# Patient Record
Sex: FEMALE | Race: WHITE | Employment: OTHER | URBAN - METROPOLITAN AREA
[De-identification: names, ages, dates, MRNs, and addresses within clinical notes are randomized per-mention and may not be internally consistent; named-entity substitution may affect disease eponyms.]

---

## 2017-01-26 ENCOUNTER — APPOINTMENT (OUTPATIENT)
Dept: CT IMAGING | Age: 67
End: 2017-01-26
Attending: EMERGENCY MEDICINE
Payer: MEDICARE

## 2017-01-26 ENCOUNTER — HOSPITAL ENCOUNTER (EMERGENCY)
Age: 67
Discharge: HOME | End: 2017-01-26
Attending: EMERGENCY MEDICINE
Payer: MEDICARE

## 2017-01-26 VITALS
WEIGHT: 146.5 LBS | SYSTOLIC BLOOD PRESSURE: 148 MMHG | BODY MASS INDEX: 26.96 KG/M2 | DIASTOLIC BLOOD PRESSURE: 73 MMHG | HEIGHT: 62 IN | HEART RATE: 115 BPM | TEMPERATURE: 98.5 F | RESPIRATION RATE: 16 BRPM | OXYGEN SATURATION: 96 %

## 2017-01-26 DIAGNOSIS — E78.2 HYPERCHOLESTEROLEMIA WITH HYPERTRIGLYCERIDEMIA: ICD-10-CM

## 2017-01-26 DIAGNOSIS — K57.32 DIVERTICULITIS OF LARGE INTESTINE WITHOUT PERFORATION OR ABSCESS WITHOUT BLEEDING: Primary | ICD-10-CM

## 2017-01-26 LAB
ALBUMIN SERPL BCP-MCNC: 3.9 G/DL (ref 3.5–5)
ALBUMIN/GLOB SERPL: 1 {RATIO} (ref 1.1–2.2)
ALP SERPL-CCNC: 90 U/L (ref 45–117)
ALT SERPL-CCNC: 27 U/L (ref 12–78)
ANION GAP BLD CALC-SCNC: 10 MMOL/L (ref 5–15)
APPEARANCE UR: CLEAR
AST SERPL W P-5'-P-CCNC: 16 U/L (ref 15–37)
BASOPHILS # BLD AUTO: 0 K/UL (ref 0–0.1)
BASOPHILS # BLD: 0 % (ref 0–1)
BILIRUB SERPL-MCNC: 0.3 MG/DL (ref 0.2–1)
BILIRUB UR QL: NEGATIVE
BUN SERPL-MCNC: 15 MG/DL (ref 6–20)
BUN/CREAT SERPL: 20 (ref 12–20)
CALCIUM SERPL-MCNC: 9.6 MG/DL (ref 8.5–10.1)
CHLORIDE SERPL-SCNC: 98 MMOL/L (ref 97–108)
CO2 SERPL-SCNC: 25 MMOL/L (ref 21–32)
COLOR UR: NORMAL
CREAT SERPL-MCNC: 0.75 MG/DL (ref 0.55–1.02)
EOSINOPHIL # BLD: 0.1 K/UL (ref 0–0.4)
EOSINOPHIL NFR BLD: 1 % (ref 0–7)
ERYTHROCYTE [DISTWIDTH] IN BLOOD BY AUTOMATED COUNT: 12.2 % (ref 11.5–14.5)
GLOBULIN SER CALC-MCNC: 3.9 G/DL (ref 2–4)
GLUCOSE SERPL-MCNC: 95 MG/DL (ref 65–100)
GLUCOSE UR STRIP.AUTO-MCNC: NEGATIVE MG/DL
HCT VFR BLD AUTO: 36.4 % (ref 35–47)
HGB BLD-MCNC: 12.4 G/DL (ref 11.5–16)
HGB UR QL STRIP: NEGATIVE
KETONES UR QL STRIP.AUTO: NEGATIVE MG/DL
LEUKOCYTE ESTERASE UR QL STRIP.AUTO: NEGATIVE
LYMPHOCYTES # BLD AUTO: 14 % (ref 12–49)
LYMPHOCYTES # BLD: 1.4 K/UL (ref 0.8–3.5)
MCH RBC QN AUTO: 31.2 PG (ref 26–34)
MCHC RBC AUTO-ENTMCNC: 34.1 G/DL (ref 30–36.5)
MCV RBC AUTO: 91.5 FL (ref 80–99)
MONOCYTES # BLD: 1.1 K/UL (ref 0–1)
MONOCYTES NFR BLD AUTO: 12 % (ref 5–13)
NEUTS SEG # BLD: 7 K/UL (ref 1.8–8)
NEUTS SEG NFR BLD AUTO: 73 % (ref 32–75)
NITRITE UR QL STRIP.AUTO: NEGATIVE
PH UR STRIP: 6 [PH] (ref 5–8)
PLATELET # BLD AUTO: 304 K/UL (ref 150–400)
POTASSIUM SERPL-SCNC: 3.6 MMOL/L (ref 3.5–5.1)
PROT SERPL-MCNC: 7.8 G/DL (ref 6.4–8.2)
PROT UR STRIP-MCNC: NEGATIVE MG/DL
RBC # BLD AUTO: 3.98 M/UL (ref 3.8–5.2)
SODIUM SERPL-SCNC: 133 MMOL/L (ref 136–145)
SP GR UR REFRACTOMETRY: 1.01 (ref 1–1.03)
UROBILINOGEN UR QL STRIP.AUTO: 0.2 EU/DL (ref 0.2–1)
WBC # BLD AUTO: 9.6 K/UL (ref 3.6–11)

## 2017-01-26 PROCEDURE — 74011636320 HC RX REV CODE- 636/320: Performed by: EMERGENCY MEDICINE

## 2017-01-26 PROCEDURE — 80053 COMPREHEN METABOLIC PANEL: CPT | Performed by: EMERGENCY MEDICINE

## 2017-01-26 PROCEDURE — 99283 EMERGENCY DEPT VISIT LOW MDM: CPT

## 2017-01-26 PROCEDURE — 36415 COLL VENOUS BLD VENIPUNCTURE: CPT | Performed by: EMERGENCY MEDICINE

## 2017-01-26 PROCEDURE — 74177 CT ABD & PELVIS W/CONTRAST: CPT

## 2017-01-26 PROCEDURE — 81003 URINALYSIS AUTO W/O SCOPE: CPT | Performed by: EMERGENCY MEDICINE

## 2017-01-26 PROCEDURE — 74011000258 HC RX REV CODE- 258: Performed by: EMERGENCY MEDICINE

## 2017-01-26 PROCEDURE — 85025 COMPLETE CBC W/AUTO DIFF WBC: CPT | Performed by: EMERGENCY MEDICINE

## 2017-01-26 RX ORDER — SODIUM CHLORIDE 0.9 % (FLUSH) 0.9 %
10 SYRINGE (ML) INJECTION
Status: COMPLETED | OUTPATIENT
Start: 2017-01-26 | End: 2017-01-26

## 2017-01-26 RX ORDER — CIPROFLOXACIN 500 MG/1
500 TABLET ORAL 2 TIMES DAILY
Qty: 20 TAB | Refills: 0 | Status: SHIPPED | OUTPATIENT
Start: 2017-01-26 | End: 2017-02-05

## 2017-01-26 RX ORDER — METRONIDAZOLE 500 MG/1
500 TABLET ORAL 2 TIMES DAILY
Qty: 20 TAB | Refills: 0 | Status: SHIPPED | OUTPATIENT
Start: 2017-01-26 | End: 2017-02-05

## 2017-01-26 RX ORDER — SIMVASTATIN 20 MG/1
TABLET, FILM COATED ORAL
Qty: 90 TAB | Refills: 0 | Status: SHIPPED | OUTPATIENT
Start: 2017-01-26 | End: 2017-02-19 | Stop reason: SDUPTHER

## 2017-01-26 RX ADMIN — SODIUM CHLORIDE 100 ML: 900 INJECTION, SOLUTION INTRAVENOUS at 11:42

## 2017-01-26 RX ADMIN — Medication 10 ML: at 11:42

## 2017-01-26 RX ADMIN — IOPAMIDOL 100 ML: 755 INJECTION, SOLUTION INTRAVENOUS at 11:42

## 2017-01-26 NOTE — DISCHARGE INSTRUCTIONS
Diverticulitis: Care Instructions  Your Care Instructions    Diverticulitis occurs when pouches form in the wall of the colon and become inflamed or infected. It can be very painful. Doctors aren't sure what causes diverticulitis. There is no proof that foods such as nuts, seeds, or berries cause it or make it worse. A low-fiber diet may cause the colon to work harder to push stool forward. Pouches may form because of this extra work. It may be hard to think about healthy eating while you're in pain. But as you recover, you might think about how you can use healthy eating for overall better health. Healthy eating may help you avoid future attacks. Follow-up care is a key part of your treatment and safety. Be sure to make and go to all appointments, and call your doctor if you are having problems. It's also a good idea to know your test results and keep a list of the medicines you take. How can you care for yourself at home? · Drink plenty of fluids, enough so that your urine is light yellow or clear like water. If you have kidney, heart, or liver disease and have to limit fluids, talk with your doctor before you increase the amount of fluids you drink. · Stick to liquids or a bland diet (plain rice, bananas, dry toast or crackers, applesauce) until you are feeling better. Then you can return to regular foods and gradually increase the amount of fiber in your diet. · Use a heating pad set on low on your belly to relieve mild cramps and pain. · Get extra rest until you are feeling better. · Be safe with medicines. Read and follow all instructions on the label. ¨ If the doctor gave you a prescription medicine for pain, take it as prescribed. ¨ If you are not taking a prescription pain medicine, ask your doctor if you can take an over-the-counter medicine. · If your doctor prescribed antibiotics, take them as directed. Do not stop taking them just because you feel better.  You need to take the full course of antibiotics. To prevent future attacks of diverticulitis  · Avoid constipation:  ¨ Include fruits, vegetables, beans, and whole grains in your diet each day. These foods are high in fiber. ¨ Drink plenty of fluids, enough so that your urine is light yellow or clear like water. If you have kidney, heart, or liver disease and have to limit fluids, talk with your doctor before you increase the amount of fluids you drink. ¨ Get some exercise every day. Build up slowly to 30 to 60 minutes a day on 5 or more days of the week. ¨ Take a fiber supplement, such as Citrucel or Metamucil, every day if needed. Read and follow all instructions on the label. ¨ Schedule time each day for a bowel movement. Having a daily routine may help. Take your time and do not strain when having a bowel movement. When should you call for help? Call 911 anytime you think you may need emergency care. For example, call if:  · You passed out (lost consciousness). · You vomit blood or what looks like coffee grounds. · You pass maroon or very bloody stools. Call your doctor now or seek immediate medical care if:  · You have severe pain or swelling in your belly. · You have a new or higher fever. · You cannot keep down fluids or medicines. · You have new pain that gets worse when you move or cough. Watch closely for changes in your health, and be sure to contact your doctor if:  · The symptoms you had when you first started feeling sick come back. · You do not get better as expected. Where can you learn more? Go to http://douglas-juany.info/. Enter H901 in the search box to learn more about \"Diverticulitis: Care Instructions. \"  Current as of: August 9, 2016  Content Version: 11.1  © 8896-6092 [employer]. Care instructions adapted under license by [employer] (which disclaims liability or warranty for this information).  If you have questions about a medical condition or this instruction, always ask your healthcare professional. [place] [address] any warranty or liability for your use of this information. We hope that we have addressed all of your medical concerns. The examination and treatment you received in the Emergency Department were for an emergent problem and were not intended as complete care. It is important that you follow up with your healthcare provider(s) for ongoing care. If your symptoms worsen or do not improve as expected, and you are unable to reach your usual health care provider(s), you should return to the Emergency Department. Today's healthcare is undergoing tremendous change, and patient satisfaction surveys are one of the many tools to assess the quality of medical care. You may receive a survey from the [employer] regarding your experience in the Emergency Department. I hope that your experience has been completely positive, particularly the medical care that I provided. As such, please participate in the survey; anything less than excellent does not meet my expectations or intentions. [facility] and [address] [place] [address] participate in nationally recognized quality of care measures. If your blood pressure is greater than 120/80, as reported below, we urge that you seek medical care to address the potential of high blood pressure, commonly known as hypertension. Hypertension can be hereditary or can be caused by certain medical conditions, pain, stress, or \"white coat syndrome. \"       Please make an appointment with your health care provider(s) for follow up of your Emergency Department visit. VITALS:   Patient Vitals for the past 8 hrs:   Temp Pulse Resp BP SpO2   01/26/17 0944 98.5 °F (36.9 °C) (!) 115 16 148/73 96 %          Thank you for allowing us to provide you with medical care today. We realize that you have many choices for your emergency care needs.   Please choose us in the future for any continued health care needs. Barbara Bocanegra MD    [facility], [facility].   Office: [phone]            Recent Results (from the past 24 hour(s))   CBC WITH AUTOMATED DIFF    Collection Time: 01/26/17 10:30 AM   Result Value Ref Range    WBC 9.6 3.6 - 11.0 K/uL    RBC 3.98 3.80 - 5.20 M/uL    HGB 12.4 11.5 - 16.0 g/dL    HCT 36.4 35.0 - 47.0 %    MCV 91.5 80.0 - 99.0 FL    MCH 31.2 26.0 - 34.0 PG    MCHC 34.1 30.0 - 36.5 g/dL    RDW 12.2 11.5 - 14.5 %    PLATELET 352 947 - 889 K/uL    NEUTROPHILS 73 32 - 75 %    LYMPHOCYTES 14 12 - 49 %    MONOCYTES 12 5 - 13 %    EOSINOPHILS 1 0 - 7 %    BASOPHILS 0 0 - 1 %    ABS. NEUTROPHILS 7.0 1.8 - 8.0 K/UL    ABS. LYMPHOCYTES 1.4 0.8 - 3.5 K/UL    ABS. MONOCYTES 1.1 (H) 0.0 - 1.0 K/UL    ABS. EOSINOPHILS 0.1 0.0 - 0.4 K/UL    ABS. BASOPHILS 0.0 0.0 - 0.1 K/UL   METABOLIC PANEL, COMPREHENSIVE    Collection Time: 01/26/17 10:30 AM   Result Value Ref Range    Sodium 133 (L) 136 - 145 mmol/L    Potassium 3.6 3.5 - 5.1 mmol/L    Chloride 98 97 - 108 mmol/L    CO2 25 21 - 32 mmol/L    Anion gap 10 5 - 15 mmol/L    Glucose 95 65 - 100 mg/dL    BUN 15 6 - 20 MG/DL    Creatinine 0.75 0.55 - 1.02 MG/DL    BUN/Creatinine ratio 20 12 - 20      GFR est AA >60 >60 ml/min/1.73m2    GFR est non-AA >60 >60 ml/min/1.73m2    Calcium 9.6 8.5 - 10.1 MG/DL    Bilirubin, total 0.3 0.2 - 1.0 MG/DL    ALT 27 12 - 78 U/L    AST 16 15 - 37 U/L    Alk.  phosphatase 90 45 - 117 U/L    Protein, total 7.8 6.4 - 8.2 g/dL    Albumin 3.9 3.5 - 5.0 g/dL    Globulin 3.9 2.0 - 4.0 g/dL    A-G Ratio 1.0 (L) 1.1 - 2.2     URINALYSIS W/ RFLX MICROSCOPIC    Collection Time: 01/26/17 10:30 AM   Result Value Ref Range    Color YELLOW/STRAW      Appearance CLEAR CLEAR      Specific gravity 1.009 1.003 - 1.030      pH (UA) 6.0 5.0 - 8.0      Protein NEGATIVE  NEG mg/dL    Glucose NEGATIVE  NEG mg/dL    Ketone NEGATIVE  NEG mg/dL    Bilirubin NEGATIVE  NEG      Blood NEGATIVE  NEG      Urobilinogen 0.2 0.2 - 1.0 EU/dL    Nitrites NEGATIVE  NEG      Leukocyte Esterase NEGATIVE  NEG         Ct Abd Pelv W Cont    Result Date: 1/26/2017  INDICATION: Abdominal pain. Intermittent abdominal pain and constipation for one and half weeks. Exam: CT of the abdomen and pelvis is performed with 5 mm collimation. The study is performed with 100 mL of nonionic IV Isovue 370. Portal venous phase images were obtained. Noncontrast images were not performed. Coronal and sagittal reformatted images were also performed. CT dose reduction was achieved through use of a standardized protocol tailored for this examination and automatic exposure control for dose modulation. Adaptive statistical iterative reconstruction (ASIR) was utilized. Direct comparison is made to prior CT dated August 2010. FINDINGS: The visualized lung bases are clear. Abdomen: Liver: There is diffuse fatty infiltration of the liver. Spleen: The spleen is normal. Adrenals: The adrenals are normal. Pancreas: The pancreas is normal. Gallbladder: The gallbladder is normal. Kidneys: The kidneys are normal. Bowel: There is inflammatory stranding and trace fluid adjacent to a mid sigmoid diverticulum. Mid sigmoid appears thickened. There is no drainable focal fluid collection to suggest abscess. There is scattered colonic diverticulosis. There is no mesenteric or retroperitoneal lymphadenopathy. Pelvis: Urinary bladder is partially filled and grossly normal. There is no pelvic lymphadenopathy. The uterus is absent. IMPRESSION: Mid sigmoid colonic diverticulitis. No associated drainable focal fluid collection.

## 2017-01-26 NOTE — ED NOTES
Pt changed into gown, bed in lowest and locked position, call light placed within reach, pt declined warm blanket at this time.

## 2017-01-26 NOTE — ED NOTES
Pt ambuatory out of ED with discharge instructions and prescriptions in hand given by Dr. Charity Oneil; pt verbalized understanding of discharge paperwork and time allotted for questions. VSS. Pt alert and oriented x 4. Pt ambulated with steady gait without assistance.

## 2017-01-26 NOTE — ED PROVIDER NOTES
HPI Comments: 78 y.o. female with past medical history significant for diverticulitis, hypercholesterolemia, migraines, MVP, esophageal ulcer, benign essential hypertension, shingles and appendectomy who presents from home with chief complaint of abdominal pain. Patient states that she is the type of person to have a regular BM each day. She arrives today after not having a normal bowel movement in the past 1.5 weeks with intermittent abdominal pain and cramping. She reports taking dulcolax and using an enema which has allowed her to have BMs \"that are mostly liquid, not much solid\", most recently this morning. She describes her abdominal pain as a dull sore sensation as if someone \"punched it\" with occasional sharp cramping pains. She denies any change in her diet. She reports decreased appetite but is \"making\" herself eat. She denies any pain currently. She states she has seen Dr. Hoda Richards in the past for diverticulitis but cannot be seen for 3 weeks. There are no other acute medical concerns at this time. PCP: Harper Álvarez MD    Note written by eligio Angel, as dictated by Pawan Ramirez MD 10:14 AM      The history is provided by the patient.         Past Medical History:   Diagnosis Date    Benign essential hypertension     Blood type O- 10/2014     with anti-M antibodies    Diverticulitis 8/3/2010     Follow up colonoscopy neg    History of esophageal ulcer (age 5)     has not recurred    History of ganglion cyst      left wrist - removed    History of shingles 2016    Hypercholesterolemia with hypertriglyceridemia 2005     controlled on medication    Hypovitaminosis D     Migraines      migraines    MVP (mitral valve prolapse)        Past Surgical History:   Procedure Laterality Date    Pr abdomen surgery proc unlisted  y-20     tubial ligation    Hx appendectomy       Age 8    Hx colonoscopy  2010 2020 for next    Hx tonsillectomy      Hx orthopaedic  1976     L wrist ganglion cyst    Pr laparoscopy w tot hysterectuterus <=250 gram  w tube/ovary N/A 10/9/2014     ROBOTIC ASSISTED TOTAL LAPAROSCOPIC HYSTERECTOMY / BILATERAL SALPINGO OOPHORECTOMY / PARTIAL PARACERVICAL VAGINECTOMY, CYSTOSCOPY performed by Allen Acosta MD at [address] [place] [address] Pr cystourethroscopy  10/9/2014      performed by Allen Acosta MD at [address] [place] [address] Hx gyn  1987     BTL    Hx gyn  10/2014     Lap JUNO/BSO         Family History:   Problem Relation Age of Onset    Alzheimer Mother 79    Arthritis-osteo Mother     Heart Disease Father 46    Diabetes Father     Hypertension Maternal Grandmother     Stroke Maternal Grandmother     Stroke Maternal Grandfather     High Cholesterol Sister     Alzheimer Brother 64     rapid decline       Social History     Social History    Marital status: SINGLE     Spouse name: N/A    Number of children: N/A    Years of education: N/A     Occupational History    Administration specialists [employer]     Social History Main Topics    Smoking status: Current Some Day Smoker     Years: 40.00    Smokeless tobacco: Never Used      Comment: 3 cigarettes/ day current. 1/2 ppd for 40 years or so    Alcohol use 0.5 - 1.0 oz/week     1 - 2 Cans of beer per week      Comment: only weekends    Drug use: No    Sexual activity: No     Other Topics Concern    Weight Concern Yes     she feels better at 125 lbs - weight gain w/ gyn surg    Exercise Yes     3-5 miles 4 days per week and enjoys dancing! Social History Narrative    . No children. Works full time for [employer],  since 2013. She lives independent at a condo in [address]: [place]    Review of Systems   Constitutional: Positive for appetite change. Negative for chills and fever. HENT: Negative for rhinorrhea, sore throat and trouble swallowing. Eyes: Negative for photophobia. Respiratory: Negative for cough and shortness of breath.     Cardiovascular: Negative for chest pain and palpitations. Gastrointestinal: Positive for abdominal pain and constipation. Negative for nausea and vomiting. Genitourinary: Negative for dysuria, frequency and hematuria. Musculoskeletal: Negative for arthralgias. Neurological: Negative for dizziness, syncope and weakness. Psychiatric/Behavioral: Negative for behavioral problems. The patient is not nervous/anxious. All other systems reviewed and are negative. Vitals:    01/26/17 0944   BP: 148/73   Pulse: (!) 115   Resp: 16   Temp: 98.5 °F (36.9 °C)   SpO2: 96%   Weight: 66.5 kg (146 lb 8 oz)   Height: 5' 2\" (1.575 m)            Physical Exam   Constitutional: She appears well-developed and well-nourished. HENT:   Head: Normocephalic and atraumatic. Mouth/Throat: Oropharynx is clear and moist.   Eyes: EOM are normal. Pupils are equal, round, and reactive to light. Neck: Normal range of motion. Neck supple. Cardiovascular: Regular rhythm, normal heart sounds and intact distal pulses. Exam reveals no gallop and no friction rub. No murmur heard. Pulse rate 115   Pulmonary/Chest: Effort normal. No respiratory distress. She has no wheezes. She has no rales. Abdominal: Soft. There is tenderness (Mild bilateral lower quadrant tenderness). There is no rebound. Musculoskeletal: Normal range of motion. She exhibits no tenderness. Neurological: She is alert. No cranial nerve deficit. Motor; symmetric   Skin: No erythema. Psychiatric: She has a normal mood and affect. Her behavior is normal.   Nursing note and vitals reviewed.   Note written by eligio Yeager, as dictated by Mirlande Knox MD 10:15 AM       [facility]  ED Course       Procedures

## 2017-02-13 DIAGNOSIS — E78.2 HYPERCHOLESTEROLEMIA WITH HYPERTRIGLYCERIDEMIA: ICD-10-CM

## 2017-02-13 DIAGNOSIS — I10 BENIGN ESSENTIAL HYPERTENSION: ICD-10-CM

## 2017-02-13 DIAGNOSIS — Z11.59 NEED FOR HEPATITIS C SCREENING TEST: ICD-10-CM

## 2017-02-13 DIAGNOSIS — E55.9 HYPOVITAMINOSIS D: ICD-10-CM

## 2017-02-17 ENCOUNTER — HOSPITAL ENCOUNTER (OUTPATIENT)
Dept: LAB | Age: 67
Discharge: HOME | End: 2017-02-17
Payer: MEDICARE

## 2017-02-17 PROCEDURE — 80061 LIPID PANEL: CPT

## 2017-02-17 PROCEDURE — 86803 HEPATITIS C AB TEST: CPT

## 2017-02-17 PROCEDURE — 85027 COMPLETE CBC AUTOMATED: CPT

## 2017-02-17 PROCEDURE — 80053 COMPREHEN METABOLIC PANEL: CPT

## 2017-02-17 PROCEDURE — 36415 COLL VENOUS BLD VENIPUNCTURE: CPT

## 2017-02-17 PROCEDURE — 82306 VITAMIN D 25 HYDROXY: CPT

## 2017-02-18 LAB
25(OH)D3+25(OH)D2 SERPL-MCNC: 36.2 NG/ML (ref 30–100)
ALBUMIN SERPL-MCNC: 4.5 G/DL (ref 3.6–4.8)
ALBUMIN/GLOB SERPL: 2.1 {RATIO} (ref 1.1–2.5)
ALP SERPL-CCNC: 65 IU/L (ref 39–117)
ALT SERPL-CCNC: 23 IU/L (ref 0–32)
AST SERPL-CCNC: 25 IU/L (ref 0–40)
BILIRUB SERPL-MCNC: 0.4 MG/DL (ref 0–1.2)
BUN SERPL-MCNC: 11 MG/DL (ref 8–27)
BUN/CREAT SERPL: 13 (ref 11–26)
CALCIUM SERPL-MCNC: 9.9 MG/DL (ref 8.7–10.3)
CHLORIDE SERPL-SCNC: 99 MMOL/L (ref 96–106)
CHOLEST SERPL-MCNC: 204 MG/DL (ref 100–199)
CO2 SERPL-SCNC: 27 MMOL/L (ref 18–29)
CREAT SERPL-MCNC: 0.82 MG/DL (ref 0.57–1)
ERYTHROCYTE [DISTWIDTH] IN BLOOD BY AUTOMATED COUNT: 13.6 % (ref 12.3–15.4)
GLOBULIN SER CALC-MCNC: 2.1 G/DL (ref 1.5–4.5)
GLUCOSE SERPL-MCNC: 91 MG/DL (ref 65–99)
HCT VFR BLD AUTO: 38 % (ref 34–46.6)
HCV AB S/CO SERPL IA: <0.1 S/CO RATIO (ref 0–0.9)
HCV AB S/CO SERPL IA: <0.1 S/CO RATIO (ref 0–0.9)
HDLC SERPL-MCNC: 51 MG/DL
HGB BLD-MCNC: 12.4 G/DL (ref 11.1–15.9)
INTERPRETATION, 910389: NORMAL
LDLC SERPL CALC-MCNC: 120 MG/DL (ref 0–99)
MCH RBC QN AUTO: 30 PG (ref 26.6–33)
MCHC RBC AUTO-ENTMCNC: 32.6 G/DL (ref 31.5–35.7)
MCV RBC AUTO: 92 FL (ref 79–97)
PLATELET # BLD AUTO: 257 X10E3/UL (ref 150–379)
POTASSIUM SERPL-SCNC: 4.5 MMOL/L (ref 3.5–5.2)
PROT SERPL-MCNC: 6.6 G/DL (ref 6–8.5)
RBC # BLD AUTO: 4.14 X10E6/UL (ref 3.77–5.28)
SODIUM SERPL-SCNC: 142 MMOL/L (ref 134–144)
TRIGL SERPL-MCNC: 163 MG/DL (ref 0–149)
VLDLC SERPL CALC-MCNC: 33 MG/DL (ref 5–40)
WBC # BLD AUTO: 4.3 X10E3/UL (ref 3.4–10.8)

## 2017-02-19 DIAGNOSIS — E78.2 HYPERCHOLESTEROLEMIA WITH HYPERTRIGLYCERIDEMIA: ICD-10-CM

## 2017-02-19 RX ORDER — SIMVASTATIN 20 MG/1
40 TABLET, FILM COATED ORAL
Qty: 90 TAB | Refills: 0 | COMMUNITY
Start: 2017-02-19 | End: 2017-04-19 | Stop reason: SDUPTHER

## 2017-02-20 NOTE — PROGRESS NOTES
The following blueKiwi message was sent to patient:    Your general lab work looks great - all within normal range. Your fasting cholesterol panel shows a few changes with stopping the Tricor - the triglycerides are just mildly elevated and the LDL type cholesterol has risen as well. I do not think it is necessary to return to the Tricor based on these results, but I do recommend doubling your simvastatin dose to 40 mg daily. You may take two of the 20 mg tablets until you run low, and then please request a new script from me.

## 2017-03-01 ENCOUNTER — OFFICE VISIT (OUTPATIENT)
Dept: INTERNAL MEDICINE CLINIC | Age: 67
End: 2017-03-01

## 2017-03-01 VITALS
HEIGHT: 62 IN | OXYGEN SATURATION: 98 % | BODY MASS INDEX: 26.46 KG/M2 | DIASTOLIC BLOOD PRESSURE: 84 MMHG | TEMPERATURE: 97.6 F | HEART RATE: 84 BPM | RESPIRATION RATE: 16 BRPM | SYSTOLIC BLOOD PRESSURE: 124 MMHG | WEIGHT: 143.8 LBS

## 2017-03-01 DIAGNOSIS — I10 BENIGN ESSENTIAL HYPERTENSION: ICD-10-CM

## 2017-03-01 DIAGNOSIS — E78.2 HYPERCHOLESTEROLEMIA WITH HYPERTRIGLYCERIDEMIA: Primary | ICD-10-CM

## 2017-03-01 PROBLEM — K57.92 ACUTE DIVERTICULITIS OF INTESTINE: Status: ACTIVE | Noted: 2017-03-01

## 2017-03-01 RX ORDER — HYDROCHLOROTHIAZIDE 25 MG/1
TABLET ORAL
Qty: 90 TAB | Refills: 1 | Status: SHIPPED | OUTPATIENT
Start: 2017-03-01 | End: 2017-06-05 | Stop reason: SDUPTHER

## 2017-03-01 NOTE — PROGRESS NOTES
Kwan Rivers is a 78 y.o. female    Chief Complaint   Patient presents with    Abnormal Lab Results     Pt wants to go over her lab results     1. Have you been to the ER, urgent care clinic since your last visit? Hospitalized since your last visit? Yes, Constipation; Abdominal Pain, Deuel's 01/26/17    2. Have you seen or consulted any other health care providers outside of the [address] [place] [address] since your last visit? Include any pap smears or colon screening.  See above

## 2017-03-01 NOTE — MR AVS SNAPSHOT
Visit Information Date & Time Provider Department Dept. Phone Encounter #  
 3/1/2017 10:40 AM [facility] [phone] [id number] Follow-up Instructions Return in about 3 months (around 6/1/2017) for HTN, Hyperlipidemia. Upcoming Health Maintenance Date Due  
 GLAUCOMA SCREENING Q2Y 4/6/2017* MEDICARE YEARLY EXAM 11/12/2017 Pneumococcal 65+ Low/Medium Risk (2 of 2 - PPSV23) 1/1/2018 BREAST CANCER SCRN MAMMOGRAM 11/15/2018 COLONOSCOPY 11/5/2019 DTaP/Tdap/Td series (2 - Td) 7/22/2026 *Topic was postponed. The date shown is not the original due date. Allergies as of 3/1/2017  Review Complete On: 3/1/2017 By: Janelle cMghee NP Severity Noted Reaction Type Reactions Codeine  04/06/2011   Side Effect Nausea Only  
 hives Current Immunizations  Reviewed on 11/11/2016 Name Date Influenza Vaccine 9/5/2016, 8/24/2015, 9/29/2014, 8/15/2013 Pneumococcal Conjugate (PCV-13) 6/12/2015 Pneumococcal Vaccine (Unspecified Type) 1/1/2013 Tdap 7/22/2016 Zoster Vaccine, Live 6/25/2016 Not reviewed this visit You Were Diagnosed With   
  
 Codes Comments Hypercholesterolemia with hypertriglyceridemia    -  Primary ICD-10-CM: D14.8 ICD-9-CM: 272.2 Benign essential hypertension     ICD-10-CM: I10 
ICD-9-CM: 401.1 Vitals BP  
  
  
  
  
  
 124/84 (BP 1 Location: Right arm, BP Patient Position: Sitting) Vitals History BMI and BSA Data Body Mass Index Body Surface Area  
 26.3 kg/m 2 1.69 m 2 Preferred Pharmacy Pharmacy Name Phone [facility] [address] [place] [address], [address] [place] [address] [phone] Your Updated Medication List  
  
   
This list is accurate as of: 3/1/17 11:23 AM.  Always use your most recent med list.  
  
  
  
  
 butalbital-acetaminophen-caffeine -40 mg per tablet Commonly known as:  Olematthiasa Rebekah Take 1 Tab by mouth every six (6) hours as needed for Pain or Headache. Max Daily Amount: 4 Tabs. diazePAM 5 mg tablet Commonly known as:  VALIUM Take 1 tablet daily as needed for migraine  
  
 hydroCHLOROthiazide 25 mg tablet Commonly known as:  HYDRODIURIL  
TAKE 1 TABLET BY MOUTH DAILY  
  
 simvastatin 20 mg tablet Commonly known as:  ZOCOR Take 2 Tabs by mouth nightly. VITAMIN C 500 mg tablet Generic drug:  ascorbic acid (vitamin C) Take  by mouth daily. VITAMIN D3 2,000 unit Tab Generic drug:  cholecalciferol (vitamin D3) Take  by mouth.  
  
 vitamin E 400 unit capsule Commonly known as:  [address] Take  by mouth daily. Prescriptions Sent to Pharmacy Refills  
 hydroCHLOROthiazide (HYDRODIURIL) 25 mg tablet 1 Sig: TAKE 1 TABLET BY MOUTH DAILY Class: Normal  
 Pharmacy: [facility] [address] [place] [address], [address] [place] [address] Ph #: [phone] Follow-up Instructions Return in about 3 months (around 6/1/2017) for HTN, Hyperlipidemia. To-Do List   
 04/05/2017 Lab:  LIPID PANEL Introducing [facility]! Dear Irvin Suh: Thank you for requesting a [employer] account. Our records indicate that you already have an active [employer] account. You can access your account anytime at [email or web address] Did you know that you can access your hospital and ER discharge instructions at any time in [employer]? You can also review all of your test results from your hospital stay or ER visit. Additional Information If you have questions, please visit the Frequently Asked Questions section of the [employer] website at [email or web address] Remember, [employer] is NOT to be used for urgent needs. For medical emergencies, dial 911. Now available from your iPhone and Android! Please provide this summary of care documentation to your next provider. Your primary care clinician is listed as Agnes Gong. If you have any questions after today's visit, please call [phone].

## 2017-03-01 NOTE — PROGRESS NOTES
77 yo female here to discuss/review lab results, and for 6 mo f/u. She reports her Tricor was stopped in Nov, and recent TRG level was 163. After this she was advised to double her Simvastatin dose which she has done about a week ago. She was in the ED in Jan for acute Diverticulitis. She was tx w/ 2 antibiotics. Bowel fx is now back to normal.  She will be seeing Dr. Jonah Aldana next week (previously saw Dr. Abdi Estrada, but couldn't get into their office). She has stopped eating beef, and is eating more veggies. No chest pain or dyspnea. She continues to use tobacco, and is smoking 10 cigs / day; she believes she will be able to cut down on this when she goes to her next job. She is walking 3 miles most days. She will see her Optometrist for Glaucoma testing on April 6. PE: WNWD WF   BP - 124/84   Weight down 3 lbs (voluntary)   Heart - RRR   Lungs - clear   Abd - no M, T,O    Imp: HTN   HLD/Hypertriglyceridemia      Plan: Refill HCTZ   Order for Lipid panel in 5 weeks since Simvastatin was increased to 40 mg   She will see Dr. Bravo Kamara for continued care in 3 mos  _____________________________________  Expected course of current diagnosed problem(s) as well as expected progression and possible complications, and desired follow up with provider are discussed with patient. Patient is encouraged to be back in touch with any questions or concerns. Patient expresses understanding of plan of care. Patient is given AVS which includes diagnoses, current medications, vitals.

## 2017-03-10 ENCOUNTER — HOSPITAL ENCOUNTER (OUTPATIENT)
Age: 67
Setting detail: OUTPATIENT SURGERY
Discharge: HOME | End: 2017-03-10
Attending: SPECIALIST | Admitting: SPECIALIST
Payer: MEDICARE

## 2017-03-10 ENCOUNTER — ANESTHESIA (OUTPATIENT)
Dept: ENDOSCOPY | Age: 67
End: 2017-03-10
Payer: MEDICARE

## 2017-03-10 ENCOUNTER — ANESTHESIA EVENT (OUTPATIENT)
Dept: ENDOSCOPY | Age: 67
End: 2017-03-10
Payer: MEDICARE

## 2017-03-10 VITALS
OXYGEN SATURATION: 99 % | SYSTOLIC BLOOD PRESSURE: 138 MMHG | HEART RATE: 72 BPM | TEMPERATURE: 97.6 F | DIASTOLIC BLOOD PRESSURE: 75 MMHG | RESPIRATION RATE: 17 BRPM

## 2017-03-10 PROCEDURE — 88305 TISSUE EXAM BY PATHOLOGIST: CPT | Performed by: SPECIALIST

## 2017-03-10 PROCEDURE — 76040000019: Performed by: SPECIALIST

## 2017-03-10 PROCEDURE — 74011000250 HC RX REV CODE- 250

## 2017-03-10 PROCEDURE — 76060000031 HC ANESTHESIA FIRST 0.5 HR: Performed by: SPECIALIST

## 2017-03-10 PROCEDURE — 77030013992 HC SNR POLYP ENDOSC BSC -B: Performed by: SPECIALIST

## 2017-03-10 PROCEDURE — 74011250636 HC RX REV CODE- 250/636: Performed by: SPECIALIST

## 2017-03-10 PROCEDURE — 74011250636 HC RX REV CODE- 250/636

## 2017-03-10 PROCEDURE — 77030027957 HC TBNG IRR ENDOGTR BUSS -B: Performed by: SPECIALIST

## 2017-03-10 RX ORDER — ATROPINE SULFATE 0.1 MG/ML
0.5 INJECTION INTRAVENOUS
Status: DISCONTINUED | OUTPATIENT
Start: 2017-03-10 | End: 2017-03-10 | Stop reason: HOSPADM

## 2017-03-10 RX ORDER — PROPOFOL 10 MG/ML
INJECTION, EMULSION INTRAVENOUS AS NEEDED
Status: DISCONTINUED | OUTPATIENT
Start: 2017-03-10 | End: 2017-03-10 | Stop reason: HOSPADM

## 2017-03-10 RX ORDER — FENTANYL CITRATE 50 UG/ML
200 INJECTION, SOLUTION INTRAMUSCULAR; INTRAVENOUS
Status: DISCONTINUED | OUTPATIENT
Start: 2017-03-10 | End: 2017-03-10 | Stop reason: HOSPADM

## 2017-03-10 RX ORDER — MIDAZOLAM HYDROCHLORIDE 1 MG/ML
.25-1 INJECTION, SOLUTION INTRAMUSCULAR; INTRAVENOUS
Status: DISCONTINUED | OUTPATIENT
Start: 2017-03-10 | End: 2017-03-10 | Stop reason: HOSPADM

## 2017-03-10 RX ORDER — LIDOCAINE HYDROCHLORIDE 20 MG/ML
INJECTION, SOLUTION EPIDURAL; INFILTRATION; INTRACAUDAL; PERINEURAL AS NEEDED
Status: DISCONTINUED | OUTPATIENT
Start: 2017-03-10 | End: 2017-03-10 | Stop reason: HOSPADM

## 2017-03-10 RX ORDER — EPINEPHRINE 0.1 MG/ML
1 INJECTION INTRACARDIAC; INTRAVENOUS
Status: DISCONTINUED | OUTPATIENT
Start: 2017-03-10 | End: 2017-03-10 | Stop reason: HOSPADM

## 2017-03-10 RX ORDER — LORAZEPAM 2 MG/ML
2 INJECTION INTRAMUSCULAR AS NEEDED
Status: DISCONTINUED | OUTPATIENT
Start: 2017-03-10 | End: 2017-03-10 | Stop reason: HOSPADM

## 2017-03-10 RX ORDER — SODIUM CHLORIDE 9 MG/ML
50 INJECTION, SOLUTION INTRAVENOUS CONTINUOUS
Status: DISCONTINUED | OUTPATIENT
Start: 2017-03-10 | End: 2017-03-10 | Stop reason: HOSPADM

## 2017-03-10 RX ORDER — NALOXONE HYDROCHLORIDE 0.4 MG/ML
0.4 INJECTION, SOLUTION INTRAMUSCULAR; INTRAVENOUS; SUBCUTANEOUS
Status: DISCONTINUED | OUTPATIENT
Start: 2017-03-10 | End: 2017-03-10 | Stop reason: HOSPADM

## 2017-03-10 RX ORDER — SODIUM CHLORIDE 0.9 % (FLUSH) 0.9 %
5-10 SYRINGE (ML) INJECTION AS NEEDED
Status: DISCONTINUED | OUTPATIENT
Start: 2017-03-10 | End: 2017-03-10 | Stop reason: HOSPADM

## 2017-03-10 RX ORDER — FLUMAZENIL 0.1 MG/ML
0.2 INJECTION INTRAVENOUS
Status: DISCONTINUED | OUTPATIENT
Start: 2017-03-10 | End: 2017-03-10 | Stop reason: HOSPADM

## 2017-03-10 RX ORDER — DEXTROMETHORPHAN/PSEUDOEPHED 2.5-7.5/.8
1.2 DROPS ORAL
Status: DISCONTINUED | OUTPATIENT
Start: 2017-03-10 | End: 2017-03-10 | Stop reason: HOSPADM

## 2017-03-10 RX ORDER — SODIUM CHLORIDE 0.9 % (FLUSH) 0.9 %
5-10 SYRINGE (ML) INJECTION EVERY 8 HOURS
Status: DISCONTINUED | OUTPATIENT
Start: 2017-03-10 | End: 2017-03-10 | Stop reason: HOSPADM

## 2017-03-10 RX ADMIN — SODIUM CHLORIDE: 900 INJECTION, SOLUTION INTRAVENOUS at 12:37

## 2017-03-10 RX ADMIN — PROPOFOL 20 MG: 10 INJECTION, EMULSION INTRAVENOUS at 13:05

## 2017-03-10 RX ADMIN — PROPOFOL 20 MG: 10 INJECTION, EMULSION INTRAVENOUS at 13:10

## 2017-03-10 RX ADMIN — PROPOFOL 20 MG: 10 INJECTION, EMULSION INTRAVENOUS at 12:59

## 2017-03-10 RX ADMIN — PROPOFOL 60 MG: 10 INJECTION, EMULSION INTRAVENOUS at 12:52

## 2017-03-10 RX ADMIN — PROPOFOL 30 MG: 10 INJECTION, EMULSION INTRAVENOUS at 12:53

## 2017-03-10 RX ADMIN — PROPOFOL 20 MG: 10 INJECTION, EMULSION INTRAVENOUS at 12:57

## 2017-03-10 RX ADMIN — PROPOFOL 20 MG: 10 INJECTION, EMULSION INTRAVENOUS at 13:07

## 2017-03-10 RX ADMIN — PROPOFOL 20 MG: 10 INJECTION, EMULSION INTRAVENOUS at 13:03

## 2017-03-10 RX ADMIN — PROPOFOL 30 MG: 10 INJECTION, EMULSION INTRAVENOUS at 12:55

## 2017-03-10 RX ADMIN — LIDOCAINE HYDROCHLORIDE 20 MG: 20 INJECTION, SOLUTION EPIDURAL; INFILTRATION; INTRACAUDAL; PERINEURAL at 12:52

## 2017-03-10 RX ADMIN — PROPOFOL 20 MG: 10 INJECTION, EMULSION INTRAVENOUS at 13:01

## 2017-03-10 NOTE — ANESTHESIA PREPROCEDURE EVALUATION
Anesthetic History   No history of anesthetic complications            Review of Systems / Medical History  Patient summary reviewed, nursing notes reviewed and pertinent labs reviewed    Pulmonary          Smoker         Neuro/Psych         Headaches     Cardiovascular    Hypertension          Hyperlipidemia    Exercise tolerance: >4 METS     GI/Hepatic/Renal               Comments: Diverticulitis (K57.92)  History of esophageal ulcer (Z87.19) Endo/Other  Within defined limits           Other Findings              Physical Exam    Airway  Mallampati: II  TM Distance: 4 - 6 cm  Neck ROM: normal range of motion   Mouth opening: Normal     Cardiovascular  Regular rate and rhythm,  S1 and S2 normal,  no murmur, click, rub, or gallop  Rhythm: regular  Rate: normal         Dental    Dentition: Caps/crowns     Pulmonary  Breath sounds clear to auscultation               Abdominal  GI exam deferred       Other Findings            Anesthetic Plan    ASA: 2  Anesthesia type: MAC          Induction: Intravenous  Anesthetic plan and risks discussed with: Patient

## 2017-03-10 NOTE — H&P
Pre-endoscopy H and P for Colonoscopy    The patient was seen and examined. Date of last colonoscopy: 2010, Polyps  No      The airway was assessed and documented. The problem list, past medical history, and medications were reviewed. Patient Active Problem List   Diagnosis Code    Hypovitaminosis D E55.9    Papanicolaou smear of cervix with atypical squamous cells of undetermined significance (ASC-US) R87.610    Ovarian mass N83.9    Abnormal antibody titer R76.8    Vaginal dysplasia N89.3    Migraine headache G43.909    Benign essential hypertension I10    Hypercholesterolemia with hypertriglyceridemia E78.2    History of colonoscopy Z98.890    Acute diverticulitis of intestine K57.92     Social History     Social History    Marital status: SINGLE     Spouse name: N/A    Number of children: N/A    Years of education: N/A     Occupational History    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 1/2 ppd for 40 years or so    Alcohol use 0.5 - 1.0 oz/week     1 - 2 Cans of beer per week      Comment: only weekends/social    Drug use: No    Sexual activity: No     Other Topics Concern    Weight Concern Yes     she feels better at 125 lbs - weight gain w/ gyn surg    Exercise Yes     3-5 miles 4 days per week and enjoys dancing! Social History Narrative    . No children. Works full time for [employer],  since 2013.   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    Benign essential hypertension     Blood type O- 10/2014    with anti-M antibodies    Diverticulitis 8/3/2010    Follow up colonoscopy neg    History of esophageal ulcer (age 5)    has not recurred    History of ganglion cyst     left wrist - removed    History of shingles 2016    Hypercholesterolemia with hypertriglyceridemia 2005    controlled on medication    Hypovitaminosis D     Ill-defined condition     ESOPHAGEAL ULCER    Migraines     migraines    MVP (mitral valve prolapse)      The patient has a family history of colon polyps in brother    Prior to Admission Medications   Prescriptions Last Dose Informant Patient Reported? Taking? ASPIRIN PO 3/6/2017 at Unknown time  Yes No   Sig: Take 81 mg by mouth daily. ascorbic acid, vitamin C, (VITAMIN C) 500 mg tablet 3/9/2017 at Unknown time  Yes Yes   Sig: Take 500 mg by mouth daily. butalbital-acetaminophen-caffeine (FIORICET, ESGIC) -40 mg per tablet Unknown at Unknown time  No No   Sig: Take 1 Tab by mouth every six (6) hours as needed for Pain or Headache. Max Daily Amount: 4 Tabs. cholecalciferol, vitamin D3, (VITAMIN D3) 2,000 unit tab 3/9/2017 at Unknown time  Yes Yes   Sig: Take 2,000 Units by mouth daily. diazepam (VALIUM) 5 mg tablet Unknown at Unknown time  No No   Sig: Take 1 tablet daily as needed for migraine   hydroCHLOROthiazide (HYDRODIURIL) 25 mg tablet 3/10/2017 at Unknown time  No Yes   Sig: TAKE 1 TABLET BY MOUTH DAILY   simvastatin (ZOCOR) 20 mg tablet 3/9/2017 at Unknown time  Yes Yes   Sig: Take 2 Tabs by mouth nightly. vitamin E (AQUA GEMS) 400 unit capsule 3/9/2017 at Unknown time  Yes Yes   Sig: Take  by mouth daily. Facility-Administered Medications: None         The review of systems is:  negative for shortness of breath or chest pain      The heart, lungs and mental status were satisfactory for the administration of MAC sedation and for the procedure. Mallampati score: See Anesthesia. I discussed with the patient the objectives, risks, consequences and alternatives to the procedure. Plan: Endoscopic procedure with MAC sedation.     Saad Velazquez MD  3/10/2017  12:27 PM    Pre-endoscopy H and P for Colonoscopy    The patient was seen and examined. Date of last colonoscopy: 2010, Polyps  No      The airway was assessed and documented. The problem list, past medical history, and medications were reviewed. Patient Active Problem List   Diagnosis Code    Hypovitaminosis D E55.9    Papanicolaou smear of cervix with atypical squamous cells of undetermined significance (ASC-US) R87.610    Ovarian mass N83.9    Abnormal antibody titer R76.8    Vaginal dysplasia N89.3    Migraine headache G43.909    Benign essential hypertension I10    Hypercholesterolemia with hypertriglyceridemia E78.2    History of colonoscopy Z98.890    Acute diverticulitis of intestine K57.92     Social History     Social History    Marital status: SINGLE     Spouse name: N/A    Number of children: N/A    Years of education: N/A     Occupational History    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 1/2 ppd for 40 years or so    Alcohol use 0.5 - 1.0 oz/week     1 - 2 Cans of beer per week      Comment: only weekends/social    Drug use: No    Sexual activity: No     Other Topics Concern    Weight Concern Yes     she feels better at 125 lbs - weight gain w/ gyn surg    Exercise Yes     3-5 miles 4 days per week and enjoys dancing! Social History Narrative    . No children. Works full time for [employer],  since 2013.   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    Benign essential hypertension     Blood type O- 10/2014    with anti-M antibodies    Diverticulitis 8/3/2010    Follow up colonoscopy neg    History of esophageal ulcer (age 5)    has not recurred    History of ganglion cyst left wrist - removed    History of shingles 2016    Hypercholesterolemia with hypertriglyceridemia 2005    controlled on medication    Hypovitaminosis D     Ill-defined condition     ESOPHAGEAL ULCER    Migraines     migraines    MVP (mitral valve prolapse)      The patient has a family history of na    Prior to Admission Medications   Prescriptions Last Dose Informant Patient Reported? Taking? ASPIRIN PO 3/6/2017 at Unknown time  Yes No   Sig: Take 81 mg by mouth daily. ascorbic acid, vitamin C, (VITAMIN C) 500 mg tablet 3/9/2017 at Unknown time  Yes Yes   Sig: Take 500 mg by mouth daily. butalbital-acetaminophen-caffeine (FIORICET, ESGIC) -40 mg per tablet Unknown at Unknown time  No No   Sig: Take 1 Tab by mouth every six (6) hours as needed for Pain or Headache. Max Daily Amount: 4 Tabs. cholecalciferol, vitamin D3, (VITAMIN D3) 2,000 unit tab 3/9/2017 at Unknown time  Yes Yes   Sig: Take 2,000 Units by mouth daily. diazepam (VALIUM) 5 mg tablet Unknown at Unknown time  No No   Sig: Take 1 tablet daily as needed for migraine   hydroCHLOROthiazide (HYDRODIURIL) 25 mg tablet 3/10/2017 at Unknown time  No Yes   Sig: TAKE 1 TABLET BY MOUTH DAILY   simvastatin (ZOCOR) 20 mg tablet 3/9/2017 at Unknown time  Yes Yes   Sig: Take 2 Tabs by mouth nightly. vitamin E (AQUA GEMS) 400 unit capsule 3/9/2017 at Unknown time  Yes Yes   Sig: Take  by mouth daily. Facility-Administered Medications: None         The review of systems is:  negative for shortness of breath or chest pain      The heart, lungs and mental status were satisfactory for the administration of MAC sedation and for the procedure. Mallampati score: See Anesthesia. I discussed with the patient the objectives, risks, consequences and alternatives to the procedure. Plan: Endoscopic procedure with MAC sedation.     Joanne Walsh MD  3/10/2017  12:27 PM

## 2017-03-10 NOTE — PROCEDURES
Colonoscopy Procedure Note    Indications:   Family history of coloretal adenoma  (screening only), recent bout of LLQ pain and diverticulitis  Referring Physician: Gege Garcia MD   Anesthesia/Sedation:MAC  Endoscopist:  Dr. Perico Goldstein  Assistant:  Endoscopy RN-1: David Wells RN; Fern Barton RN    Preoperative diagnosis: DIVERTICULOSIS    Postoperative diagnosis: 1-Diverticulosis  2-Colon polyps      Procedure in Detail:  Informed consent was obtained for the procedure, including sedation. Risks of perforation, hemorrhage, adverse drug reaction, and aspiration were discussed. The patient was placed in the left lateral decubitus position. Based on the pre-procedure assessment, including review of the patient's medical history, medications, allergies, and review of systems, she had been deemed to be an appropriate candidate for moderate sedation; she was therefore sedated with the medications listed above. The patient was monitored continuously with ECG tracing, pulse oximetry, blood pressure monitoring, and direct observations. A rectal examination was performed. The [id number] was inserted into the rectum and advanced under direct vision to the cecum, which was identified by the ileocecal valve and appendiceal orifice. The quality of the colonic preparation was excellent. A careful inspection was made as the colonoscope was withdrawn, including a retroflexed view of the rectum; findings and interventions are described below. Findings:   Rectum: 3 mm polyp removed with cold snare  Sigmoid: moderate diverticulosis; Descending Colon: moderate diverticulosis;  Transverse Colon: mild diverticulosis; Ascending Colon: mild diverticulosis; 4 mm polyp removed with cold snare  Cecum: normal  Terminal Ileum: not intubated    Specimens:     see above    EBL: None    Complications: None; patient tolerated the procedure well.       Recommendations:     - Repeat colonoscopy in 5 years.    Signed By: Ying Sim MD                        March 10, 2017

## 2017-03-10 NOTE — IP AVS SNAPSHOT
[address] 
[phone] Patient: Kwan Rivers MRN: [id number] OIA:7/42/[birth year]35 You are allergic to the following Allergen Reactions Other Medication Other (comments) PT HAS A RARE BLOOD TYPE - O NEG WITH ANTI M- ANTIBODIES. Codeine Nausea Only  
 hives Recent Documentation Breastfeeding? OB Status Smoking Status No Postmenopausal Current Every Day Smoker Emergency Contacts Name Discharge Info Relation Home Work Mobile Cindy Belle CAREGIVER [3] Friend [5] [phone] About your hospitalization You were admitted on:  March 10, 2017 You last received care in the:  [facility] ENDOSCOPY You were discharged on:  March 10, 2017 Unit phone number:  [phone] Why you were hospitalized Your primary diagnosis was:  Not on File Providers Seen During Your Hospitalizations Provider Role Specialty Primary office phone Aspen Gutierrez MD Attending Provider Gastroenterology [phone] Your Primary Care Physician (PCP) Primary Care Physician Office Phone Office Fax Deann Cornelius [phone] Follow-up Information None Current Discharge Medication List  
  
CONTINUE these medications which have NOT CHANGED Dose & Instructions Dispensing Information Comments Morning Noon Evening Bedtime  
 butalbital-acetaminophen-caffeine -40 mg per tablet Commonly known as:  Ameyaruba Sears Your next dose is: Today, Tomorrow Other:  _________ Dose:  1 Tab Take 1 Tab by mouth every six (6) hours as needed for Pain or Headache. Max Daily Amount: 4 Tabs. Quantity:  90 Tab Refills:  0  
     
   
   
   
  
 diazePAM 5 mg tablet Commonly known as:  VALIUM Your next dose is: Today, Tomorrow Other:  _________ Take 1 tablet daily as needed for migraine Quantity:  90 Tab Refills:  0 This is a 90 day supply  
    
   
   
   
  
 hydroCHLOROthiazide 25 mg tablet Commonly known as:  HYDRODIURIL Your next dose is: Today, Tomorrow Other:  _________ TAKE 1 TABLET BY MOUTH DAILY Quantity:  90 Tab Refills:  1  
     
   
   
   
  
 simvastatin 20 mg tablet Commonly known as:  ZOCOR Your next dose is: Today, Tomorrow Other:  _________ Dose:  40 mg Take 2 Tabs by mouth nightly. Quantity:  90 Tab Refills:  0  
     
   
   
   
  
 VITAMIN C 500 mg tablet Generic drug:  ascorbic acid (vitamin C) Your next dose is: Today, Tomorrow Other:  _________ Dose:  500 mg Take 500 mg by mouth daily. Refills:  0  
     
   
   
   
  
 VITAMIN D3 2,000 unit Tab Generic drug:  cholecalciferol (vitamin D3) Your next dose is: Today, Tomorrow Other:  _________ Dose:  2000 Units Take 2,000 Units by mouth daily. Refills:  0  
     
   
   
   
  
 vitamin E 400 unit capsule Commonly known as:  [address] Your next dose is: Today, Tomorrow Other:  _________ Take  by mouth daily. Refills:  0 STOP taking these medications ASPIRIN PO Discharge Instructions Cindy Henderson [id number] 
1950 COLON DISCHARGE INSTRUCTIONS Discomfort: 
Redness at IV site- apply warm compress to area; if redness or soreness persist- contact your physician There may be a slight amount of blood passed from the rectum Gaseous discomfort- walking, belching will help relieve any discomfort You may not operate a vehicle for 12 hours You may not engage in an occupation involving machinery or appliances for rest of today You may not drink alcoholic beverages for at least 12 hours Avoid making any critical decisions for at least 24 hour DIET: 
 Regular diet.  however -  remember your colon is empty and a heavy meal will produce gas. Avoid these foods:  vegetables, fried / greasy foods, carbonated drinks for today. ACTIVITY: 
You may resume your normal daily activities it is recommended that you spend the remainder of the day resting -  avoid any strenuous activity. CALL M.D. ANY SIGN OF: Increasing pain, nausea, vomiting Abdominal distension (swelling) New increased bleeding (oral or rectal) Fever (chills) Pain in chest area Bloody discharge from nose or mouth Shortness of breath You may not  take any Advil, Aspirin, Ibuprofen, Motrin, Aleve, Goodys, or any similar pain or arthritis products for 3 days, ONLY  Tylenol as needed for pain. Follow-up Instructions: 
 Call Dr. Samantha Cuellar Results of procedure / biopsy in 10-14days Office telephone for problems or questions [phone] Two very small polyps removed - no cancer Recommendation for next colonscopy in 5 years If < 10 years, reason:  above average risk patient Colon Polyps: Care Instructions Your Care Instructions Colon polyps are growths in the colon or the rectum. The cause of most colon polyps is not known, and most people who get them do not have any problems. But a certain kind can turn into cancer. For this reason, regular testing for colon polyps is important for people age 48 and older and anyone who has an increased risk for colon cancer. Polyps are usually found through routine colon cancer screening tests. Although most colon polyps are not cancerous, they are usually removed and then tested for cancer. Screening for colon cancer saves lives because the cancer can usually be cured if it is caught early. If you have a polyp that is the type that can turn into cancer, you may need more tests to examine your entire colon. The doctor will remove any other polyps that he or she finds, and you will be tested more often. Follow-up care is a key part of your treatment and safety. Be sure to make and go to all appointments, and call your doctor if you are having problems. It's also a good idea to know your test results and keep a list of the medicines you take. How can you care for yourself at home? Regular exams to look for colon polyps are the best way to prevent polyps from turning into colon cancer. These can include stool tests, sigmoidoscopy, colonoscopy, and CT colonography. Talk with your doctor about a testing schedule that is right for you. To prevent polyps There is no home treatment that can prevent colon polyps. But these steps may help lower your risk for cancer. · Stay active. Being active can help you get to and stay at a healthy weight. Try to exercise on most days of the week. Walking is a good choice. · Eat well. Choose a variety of vegetables, fruits, legumes (such as peas and beans), fish, poultry, and whole grains. · Do not smoke. If you need help quitting, talk to your doctor about stop-smoking programs and medicines. These can increase your chances of quitting for good. · If you drink alcohol, limit how much you drink. Limit alcohol to 2 drinks a day for men and 1 drink a day for women. When should you call for help? Call your doctor now or seek immediate medical care if: 
· You have severe belly pain. · Your stools are maroon or very bloody. Watch closely for changes in your health, and be sure to contact your doctor if: 
· You have a fever. · You have nausea or vomiting. · You have a change in bowel habits (new constipation or diarrhea). · Your symptoms get worse or are not improving as expected. Where can you learn more? Go to http://douglas-juany.info/. Enter 95 [id number] in the search box to learn more about \"Colon Polyps: Care Instructions. \" Current as of: August 24, 2016 Content Version: 11.1 © 5975-6866 [employer].  Care instructions adapted under license by [address] (which disclaims liability or warranty for this information). If you have questions about a medical condition or this instruction, always ask your healthcare professional. [address] any warranty or liability for your use of this information. Diverticulosis: Care Instructions Your Care Instructions In diverticulosis, pouches called diverticula form in the wall of the large intestine (colon). The pouches do not cause any pain or other symptoms. Most people who have diverticulosis do not know they have it. But the pouches sometimes bleed, and if they become infected, they can cause pain and other symptoms. When this happens, it is called diverticulitis. Diverticula form when pressure pushes the wall of the colon outward at certain weak points. A diet that is too low in fiber can cause diverticula. Follow-up care is a key part of your treatment and safety. Be sure to make and go to all appointments, and call your doctor if you are having problems. It's also a good idea to know your test results and keep a list of the medicines you take. How can you care for yourself at home? · Include fruits, leafy green vegetables, beans, and whole grains in your diet each day. These foods are high in fiber. · Take a fiber supplement, such as Citrucel or Metamucil, every day if needed. Read and follow all instructions on the label. · Drink plenty of fluids, enough so that your urine is light yellow or clear like water. If you have kidney, heart, or liver disease and have to limit fluids, talk with your doctor before you increase the amount of fluids you drink. · Get at least 30 minutes of exercise on most days of the week. Walking is a good choice. You also may want to do other activities, such as running, swimming, cycling, or playing tennis or team sports. · Cut out foods that cause gas, pain, or other symptoms. When should you call for help? Call your doctor now or seek immediate medical care if: 
· You have belly pain. · You pass maroon or very bloody stools. · You have a fever. · You have nausea and vomiting. · You have unusual changes in your bowel movements or abdominal swelling. · You have burning pain when you urinate. · You have abnormal vaginal discharge. · You have shoulder pain. · You have cramping pain that does not get better when you have a bowel movement or pass gas. · You pass gas or stool from your urethra while urinating. Watch closely for changes in your health, and be sure to contact your doctor if you have any problems. Where can you learn more? Go to http://douglas-juany.info/. Enter L540 in the search box to learn more about \"Diverticulosis: Care Instructions. \" Current as of: August 9, 2016 Content Version: 11.1 © 6840-6057 [employer]. Care instructions adapted under license by [employer] (which disclaims liability or warranty for this information). If you have questions about a medical condition or this instruction, always ask your healthcare professional. [place] [address] any warranty or liability for your use of this information. Discharge Orders None Introducing [facility]! Dear Chayo Gutierrez: Thank you for requesting a [email or web address] account. Our records indicate that you already have an active [email or web address] account. You can access your account anytime at [email or web address] Did you know that you can access your hospital and ER discharge instructions at any time in [email or web address]? You can also review all of your test results from your hospital stay or ER visit. Additional Information If you have questions, please visit the Frequently Asked Questions section of the [email or web address] website at [email or web address] Remember, [email or web address] is NOT to be used for urgent needs. For medical emergencies, dial 911. Now available from your iPhone and Android! General Information Please provide this summary of care documentation to your next provider. Patient Signature:  ____________________________________________________________ Date:  ____________________________________________________________  
  
Burlene Dallin Provider Signature:  ____________________________________________________________ Date:  ____________________________________________________________

## 2017-03-10 NOTE — PERIOP NOTES
Initial RN admission and assessment performed and documented in Endoscopy navigator. Patient evaluated by anesthesia in pre-procedure holding. All procedural vital signs, airway assessment, and level of consciousness information monitored and recorded by anesthesia staff on the anesthesia record. Anesthesia reports meds given during procedure, see MAR. Received report from anesthesia staff on vital signs and status of patient.

## 2017-03-10 NOTE — ANESTHESIA POSTPROCEDURE EVALUATION
Post-Anesthesia Evaluation and Assessment    Patient: Lynette Abbasi MRN: [id number]  SSN: [id number]    YOB: 1950  Age: 78 y.o. Sex: female       Cardiovascular Function/Vital Signs  Visit Vitals    /75    Pulse 72    Temp 36.4 °C (97.6 °F)    Resp 17    SpO2 99%    Breastfeeding No       Patient is status post MAC anesthesia for Procedure(s):  COLONOSCOPY  ENDOSCOPIC POLYPECTOMY. Nausea/Vomiting: None    Postoperative hydration reviewed and adequate. Pain:  Pain Scale 1: Numeric (0 - 10) (03/10/17 1337)  Pain Intensity 1: 0 (03/10/17 1337)   Managed    Neurological Status: At baseline    Mental Status and Level of Consciousness: Alert and oriented     Pulmonary Status:   O2 Device: Room air (03/10/17 1337)   Adequate oxygenation and airway patent    Complications related to anesthesia: None    Post-anesthesia assessment completed.  No concerns    Signed By: Philippe Santana DO     March 10, 2017

## 2017-03-10 NOTE — DISCHARGE INSTRUCTIONS
Noris Roldan  [id number]  1950    COLON DISCHARGE INSTRUCTIONS  Discomfort:  Redness at IV site- apply warm compress to area; if redness or soreness persist- contact your physician  There may be a slight amount of blood passed from the rectum  Gaseous discomfort- walking, belching will help relieve any discomfort  You may not operate a vehicle for 12 hours  You may not engage in an occupation involving machinery or appliances for rest of today  You may not drink alcoholic beverages for at least 12 hours  Avoid making any critical decisions for at least 24 hour  DIET:   Regular diet. - however -  remember your colon is empty and a heavy meal will produce gas. Avoid these foods:  vegetables, fried / greasy foods, carbonated drinks for today. ACTIVITY:  You may resume your normal daily activities it is recommended that you spend the remainder of the day resting -  avoid any strenuous activity. CALL M.D. ANY SIGN OF:  Increasing pain, nausea, vomiting  Abdominal distension (swelling)  New increased bleeding (oral or rectal)  Fever (chills)  Pain in chest area  Bloody discharge from nose or mouth  Shortness of breath    You may not  take any Advil, Aspirin, Ibuprofen, Motrin, Aleve, Goodys, or any similar pain or arthritis products for 3 days, ONLY  Tylenol as needed for pain. Follow-up Instructions:   Call Dr. Bernice Alvarez  Results of procedure / biopsy in 10-14days   Office telephone for problems or questions [phone]  Two very small polyps removed - no cancer    Recommendation for next colonscopy in 5 years  If < 10 years, reason:  above average risk patient             Colon Polyps: Care Instructions  Your Care Instructions    Colon polyps are growths in the colon or the rectum. The cause of most colon polyps is not known, and most people who get them do not have any problems. But a certain kind can turn into cancer.  For this reason, regular testing for colon polyps is important for people age 48 and older and anyone who has an increased risk for colon cancer. Polyps are usually found through routine colon cancer screening tests. Although most colon polyps are not cancerous, they are usually removed and then tested for cancer. Screening for colon cancer saves lives because the cancer can usually be cured if it is caught early. If you have a polyp that is the type that can turn into cancer, you may need more tests to examine your entire colon. The doctor will remove any other polyps that he or she finds, and you will be tested more often. Follow-up care is a key part of your treatment and safety. Be sure to make and go to all appointments, and call your doctor if you are having problems. It's also a good idea to know your test results and keep a list of the medicines you take. How can you care for yourself at home? Regular exams to look for colon polyps are the best way to prevent polyps from turning into colon cancer. These can include stool tests, sigmoidoscopy, colonoscopy, and CT colonography. Talk with your doctor about a testing schedule that is right for you. To prevent polyps  There is no home treatment that can prevent colon polyps. But these steps may help lower your risk for cancer. · Stay active. Being active can help you get to and stay at a healthy weight. Try to exercise on most days of the week. Walking is a good choice. · Eat well. Choose a variety of vegetables, fruits, legumes (such as peas and beans), fish, poultry, and whole grains. · Do not smoke. If you need help quitting, talk to your doctor about stop-smoking programs and medicines. These can increase your chances of quitting for good. · If you drink alcohol, limit how much you drink. Limit alcohol to 2 drinks a day for men and 1 drink a day for women. When should you call for help? Call your doctor now or seek immediate medical care if:  · You have severe belly pain. · Your stools are maroon or very bloody.   Watch closely for changes in your health, and be sure to contact your doctor if:  · You have a fever. · You have nausea or vomiting. · You have a change in bowel habits (new constipation or diarrhea). · Your symptoms get worse or are not improving as expected. Where can you learn more? Go to http://douglas-juany.info/. Enter 95 [id number] in the search box to learn more about \"Colon Polyps: Care Instructions. \"  Current as of: August 24, 2016  Content Version: 11.1  © 5778-4095 [employer]. Care instructions adapted under license by [employer] (which disclaims liability or warranty for this information). If you have questions about a medical condition or this instruction, always ask your healthcare professional. [address] any warranty or liability for your use of this information. Diverticulosis: Care Instructions  Your Care Instructions  In diverticulosis, pouches called diverticula form in the wall of the large intestine (colon). The pouches do not cause any pain or other symptoms. Most people who have diverticulosis do not know they have it. But the pouches sometimes bleed, and if they become infected, they can cause pain and other symptoms. When this happens, it is called diverticulitis. Diverticula form when pressure pushes the wall of the colon outward at certain weak points. A diet that is too low in fiber can cause diverticula. Follow-up care is a key part of your treatment and safety. Be sure to make and go to all appointments, and call your doctor if you are having problems. It's also a good idea to know your test results and keep a list of the medicines you take. How can you care for yourself at home? · Include fruits, leafy green vegetables, beans, and whole grains in your diet each day. These foods are high in fiber. · Take a fiber supplement, such as Citrucel or Metamucil, every day if needed.  Read and follow all instructions on the label. · Drink plenty of fluids, enough so that your urine is light yellow or clear like water. If you have kidney, heart, or liver disease and have to limit fluids, talk with your doctor before you increase the amount of fluids you drink. · Get at least 30 minutes of exercise on most days of the week. Walking is a good choice. You also may want to do other activities, such as running, swimming, cycling, or playing tennis or team sports. · Cut out foods that cause gas, pain, or other symptoms. When should you call for help? Call your doctor now or seek immediate medical care if:  · You have belly pain. · You pass maroon or very bloody stools. · You have a fever. · You have nausea and vomiting. · You have unusual changes in your bowel movements or abdominal swelling. · You have burning pain when you urinate. · You have abnormal vaginal discharge. · You have shoulder pain. · You have cramping pain that does not get better when you have a bowel movement or pass gas. · You pass gas or stool from your urethra while urinating. Watch closely for changes in your health, and be sure to contact your doctor if you have any problems. Where can you learn more? Go to http://douglas-juany.info/. Enter I243 in the search box to learn more about \"Diverticulosis: Care Instructions. \"  Current as of: August 9, 2016  Content Version: 11.1  © 2764-0534 [employer]. Care instructions adapted under license by [employer] (which disclaims liability or warranty for this information). If you have questions about a medical condition or this instruction, always ask your healthcare professional. [place] [address] any warranty or liability for your use of this information.

## 2017-04-05 DIAGNOSIS — E78.2 HYPERCHOLESTEROLEMIA WITH HYPERTRIGLYCERIDEMIA: ICD-10-CM

## 2017-04-14 ENCOUNTER — HOSPITAL ENCOUNTER (OUTPATIENT)
Dept: LAB | Age: 67
Discharge: HOME | End: 2017-04-14
Payer: MEDICARE

## 2017-04-14 PROCEDURE — 36415 COLL VENOUS BLD VENIPUNCTURE: CPT

## 2017-04-14 PROCEDURE — 80061 LIPID PANEL: CPT

## 2017-04-15 LAB
CHOLEST SERPL-MCNC: 188 MG/DL (ref 100–199)
HDLC SERPL-MCNC: 49 MG/DL
INTERPRETATION, 910389: NORMAL
LDLC SERPL CALC-MCNC: 101 MG/DL (ref 0–99)
TRIGL SERPL-MCNC: 192 MG/DL (ref 0–149)
VLDLC SERPL CALC-MCNC: 38 MG/DL (ref 5–40)

## 2017-04-19 DIAGNOSIS — E78.1 HYPERTRIGLYCERIDEMIA: ICD-10-CM

## 2017-04-19 DIAGNOSIS — E78.5 HYPERLIPIDEMIA, UNSPECIFIED HYPERLIPIDEMIA TYPE: Primary | ICD-10-CM

## 2017-04-19 DIAGNOSIS — E78.2 HYPERCHOLESTEROLEMIA WITH HYPERTRIGLYCERIDEMIA: ICD-10-CM

## 2017-04-19 RX ORDER — SIMVASTATIN 20 MG/1
20 TABLET, FILM COATED ORAL
Qty: 90 TAB | Refills: 1 | Status: SHIPPED | OUTPATIENT
Start: 2017-04-19 | End: 2017-06-06 | Stop reason: DRUGHIGH

## 2017-04-19 RX ORDER — FENOFIBRATE 48 MG/1
48 TABLET, COATED ORAL DAILY
Qty: 90 TAB | Refills: 1 | Status: SHIPPED | OUTPATIENT
Start: 2017-04-19 | End: 2017-06-05 | Stop reason: ALTCHOICE

## 2017-04-19 NOTE — TELEPHONE ENCOUNTER
Will back off Zocor to 20 mg and add low dose Tricor at 48 mg. Retest fasting lipid panel in 6 weeks (order mailed to patient).

## 2017-04-28 ENCOUNTER — DOCUMENTATION ONLY (OUTPATIENT)
Dept: INTERNAL MEDICINE CLINIC | Age: 67
End: 2017-04-28

## 2017-04-28 NOTE — PROGRESS NOTES
Called patient to discuss her request for prior authorization for Fenofibrate, which has been denied by her insurance company. Per Dr. Citlalli Pierre - patient does not need to be on this medication and would like for her to discontinue and reassess when she comes on for her visit with Dr. Talita Alegre on June 5, 2017. Patient nstructed to continue with current dosage of Zocor x 2 tablets.

## 2017-06-02 ENCOUNTER — HOSPITAL ENCOUNTER (OUTPATIENT)
Dept: LAB | Age: 67
Discharge: HOME | End: 2017-06-02
Payer: MEDICARE

## 2017-06-02 PROCEDURE — 84460 ALANINE AMINO (ALT) (SGPT): CPT

## 2017-06-02 PROCEDURE — 36415 COLL VENOUS BLD VENIPUNCTURE: CPT

## 2017-06-02 PROCEDURE — 80061 LIPID PANEL: CPT

## 2017-06-05 ENCOUNTER — DOCUMENTATION ONLY (OUTPATIENT)
Dept: INTERNAL MEDICINE CLINIC | Age: 67
End: 2017-06-05

## 2017-06-05 ENCOUNTER — OFFICE VISIT (OUTPATIENT)
Dept: INTERNAL MEDICINE CLINIC | Age: 67
End: 2017-06-05

## 2017-06-05 VITALS
HEART RATE: 83 BPM | HEIGHT: 62 IN | BODY MASS INDEX: 26.87 KG/M2 | WEIGHT: 146 LBS | OXYGEN SATURATION: 98 % | TEMPERATURE: 98.3 F | DIASTOLIC BLOOD PRESSURE: 62 MMHG | RESPIRATION RATE: 18 BRPM | SYSTOLIC BLOOD PRESSURE: 126 MMHG

## 2017-06-05 DIAGNOSIS — E55.9 HYPOVITAMINOSIS D: ICD-10-CM

## 2017-06-05 DIAGNOSIS — E78.2 HYPERCHOLESTEROLEMIA WITH HYPERTRIGLYCERIDEMIA: ICD-10-CM

## 2017-06-05 DIAGNOSIS — E78.5 HYPERLIPIDEMIA, UNSPECIFIED HYPERLIPIDEMIA TYPE: ICD-10-CM

## 2017-06-05 DIAGNOSIS — I10 BENIGN ESSENTIAL HYPERTENSION: Primary | ICD-10-CM

## 2017-06-05 DIAGNOSIS — G43.909 MIGRAINE WITHOUT STATUS MIGRAINOSUS, NOT INTRACTABLE, UNSPECIFIED MIGRAINE TYPE: ICD-10-CM

## 2017-06-05 DIAGNOSIS — Z87.891 PERSONAL HISTORY OF NICOTINE DEPENDENCE: ICD-10-CM

## 2017-06-05 DIAGNOSIS — Z86.69 HX OF MIGRAINES: ICD-10-CM

## 2017-06-05 DIAGNOSIS — E78.1 HYPERTRIGLYCERIDEMIA: ICD-10-CM

## 2017-06-05 DIAGNOSIS — K76.0 FATTY LIVER DISEASE, NONALCOHOLIC: ICD-10-CM

## 2017-06-05 DIAGNOSIS — F17.200 SMOKING: ICD-10-CM

## 2017-06-05 LAB
ALT SERPL-CCNC: 24 IU/L (ref 0–32)
AST SERPL-CCNC: 21 IU/L (ref 0–40)
CHOLEST SERPL-MCNC: 162 MG/DL (ref 100–199)
HDLC SERPL-MCNC: 43 MG/DL
INTERPRETATION, 910389: NORMAL
LDLC SERPL CALC-MCNC: 79 MG/DL (ref 0–99)
SPECIMEN STATUS REPORT, ROLRST: NORMAL
TRIGL SERPL-MCNC: 199 MG/DL (ref 0–149)
VLDLC SERPL CALC-MCNC: 40 MG/DL (ref 5–40)

## 2017-06-05 RX ORDER — HYDROCHLOROTHIAZIDE 25 MG/1
TABLET ORAL
Qty: 90 TAB | Refills: 3 | Status: SHIPPED | OUTPATIENT
Start: 2017-06-05 | End: 2019-06-23

## 2017-06-05 RX ORDER — BUTALBITAL, ACETAMINOPHEN AND CAFFEINE 50; 325; 40 MG/1; MG/1; MG/1
1 TABLET ORAL
Qty: 30 TAB | Refills: 0 | Status: SHIPPED | OUTPATIENT
Start: 2017-06-05 | End: 2017-09-15 | Stop reason: SDUPTHER

## 2017-06-05 RX ORDER — ASPIRIN 81 MG/1
81 TABLET ORAL DAILY
Qty: 30 TAB | Refills: 12
Start: 2017-06-05

## 2017-06-05 RX ORDER — DIAZEPAM 5 MG/1
5 TABLET ORAL
Qty: 30 TAB | Refills: 0 | Status: SHIPPED | OUTPATIENT
Start: 2017-06-05 | End: 2017-06-05 | Stop reason: ALTCHOICE

## 2017-06-05 NOTE — PROGRESS NOTES
Chief Complaint   Patient presents with    Hypertension    Cholesterol Problem     1. Have you been to the ER, urgent care clinic since your last visit? Yes, [facility] 2 weeks ago  Hospitalized since your last visit? No    2. Have you seen or consulted any other health care providers outside of the [address] [place] [address] since your last visit? No Include any pap smears or colon screening.  No

## 2017-06-05 NOTE — PROGRESS NOTES
Hypertension and Cholesterol Problem       HPI:  Madi Strong is a 78y.o. year old female who is here to establish care. She  had her medical care:    [facility]    She reports the following history and medical concerns:      [facility] told her to stop the tricor and doubled up on zocor to two of the 20 mg. She did labs recently. HTN- 3 years. Started on HCTZ-  No heart issues    Low Vit D- 2000 International units  10 years. Chol- on zocor 20 mg for 10 years. 6 months ago- she increased zocor to 40 mg.    6 months ago- she stopped Tricor-  (was on 3 years)    Migraines- on fiorcet and valium. Dr. Omari Shetty started her on it. She gets a couple of week. Pt aware of fiorcet has caffeine and can be addictive. Valium- aware it can cause memory problems. Took 8 a day. Saw a neurologist- adult onset migraine. Neurologist started her on that. Currently smoker-- half pack a day. Agreed to CT screening    Agreed to stop valium as fiorcet does the job. Fatty liver- CT abdomen        Assessment and Plan        1. Benign essential hypertension  Tolerating medication. Denies dizziness that is positional, SOB, or chest pain. Understands the importance of compliance to reduce risk of future heart failure. Agreed to call if any of above symptoms develop and  stay on current regimen of    - hydroCHLOROthiazide (HYDRODIURIL) 25 mg tablet; TAKE 1 TABLET BY MOUTH DAILY  Dispense: 90 Tab; Refill: 3    2. Hypercholesterolemia with hypertriglyceridemia  Waiting on Friday's labs. For now stay on simvastatin 40 mg.    3. Migraine without status migrainosus, not intractable, unspecified migraine type  Pt agreed to stop valium.   - butalbital-acetaminophen-caffeine (FIORICET, ESGIC) -40 mg per tablet; Take 1 Tab by mouth daily as needed for Pain or Headache. Dispense: 30 Tab; Refill: 0    4. Hypovitaminosis D  Patient compliant with Vit D.   Emphasized importance of taking with food and not too much because it can be toxic to our body. Therefore, it should be checked regularly. Levels ordered. 5. Smoking  It is very important that she quit smoking. There are various alternatives available to help with this difficult task, but first and foremost, she must make a firm commitment and decision to quit. The nature of nicotine addiction is discussed and that a program like Quit Smart has proven to be the most helpful. The usefulness of behavioral therapy is discussed and suggested. Patient is aware that She can discuss with me anytime if she decides she wants to quit smoking or discuss medications like zyban or chantix for assistance. The long term consequences of smoking besides cancer, but heart disease, loss of limb/amputation and chronic oxygen therapy were made known.   - CT LOW DOSE LUNG CANCER SCREENING; Future    6. Personal history of nicotine dependence   As above. Pt aware she needs annual screening as long as she smokes. - CT LOW DOSE LUNG CANCER SCREENING; Future    7. Hx of migraines  Pt ok to stop valium.    - butalbital-acetaminophen-caffeine (FIORICET, ESGIC) -40 mg per tablet; Take 1 Tab by mouth daily as needed for Pain or Headache. Dispense: 30 Tab; Refill: 0    8. Fatty liver disease, nonalcoholic  Pt was not aware of this. Reduce carb intake and processed foods. Can lead to cirrhosis.       Visit Vitals    /62 (BP 1 Location: Left arm, BP Patient Position: Sitting)    Pulse 83    Temp 98.3 °F (36.8 °C) (Oral)    Resp 18    Ht 5' 2\" (1.575 m)    Wt 146 lb (66.2 kg)    LMP 04/06/2005    SpO2 98%    BMI 26.7 kg/m2       Historical Data    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    Benign essential hypertension     Blood type O- 10/2014    with anti-M antibodies    Diverticulitis 8/3/2010    Follow up colonoscopy neg    Diverticulosis 03/10/2017    Fatty liver disease, nonalcoholic 8/8/0427    H/O colonoscopy 03/10/2017    History of esophageal ulcer (age 5)    has not recurred    History of ganglion cyst     left wrist - removed    History of shingles 2016    Hypercholesterolemia with hypertriglyceridemia 2005    controlled on medication    Hypovitaminosis D     Ill-defined condition     ESOPHAGEAL ULCER    Migraines     migraines    MVP (mitral valve prolapse)     Personal history of nicotine dependence  6/5/2017    Smoking 6/5/2017       Past Surgical History:   Procedure Laterality Date    ABDOMEN SURGERY PROC UNLISTED  y-20    tubal ligation    COLONOSCOPY N/A 3/10/2017    COL[facility] performed [facility] at [facility] ENDOSCOPY    CYSTOURETHROSCOPY  10/9/2014     performed by Janette Mantilla MD at [address] HX APPENDECTOMY      Age 8    HX COLONOSCOPY  2010 2020 for next    HX GYN  1987    BTL    HX GYN  10/2014    Lap JUNO/BSO    HX ORTHOPAEDIC  1976    L wrist ganglion cyst    HX TONSILLECTOMY      OR LAPAROSCOPY W TOT HYSTERECTUTERUS <=250 GRAM  W TUBE/OVARY N/A 10/9/2014    ROBOTIC ASSISTED TOTAL LAPAROSCOPIC HYSTERECTOMY / BILATERAL SALPINGO OOPHORECTOMY / PARTIAL PARACERVICAL VAGINECTOMY, CYSTOSCOPY performed by Janette Mantilla MD at [facility]       Outpatient Encounter Prescriptions as of 6/5/2017   Medication Sig Dispense Refill    aspirin delayed-release 81 mg tablet Take 1 Tab by mouth daily. 30 Tab 12    hydroCHLOROthiazide (HYDRODIURIL) 25 mg tablet TAKE 1 TABLET BY MOUTH DAILY 90 Tab 3    butalbital-acetaminophen-caffeine (FIORICET, ESGIC) -40 mg per tablet Take 1 Tab by mouth daily as needed for Pain or Headache. 30 Tab 0    simvastatin (ZOCOR) 20 mg tablet Take 1 Tab by mouth nightly. 90 Tab 1    cholecalciferol, vitamin D3, (VITAMIN D3) 2,000 unit tab Take 2,000 Units by mouth daily.  vitamin E (AQUA GEMS) 400 unit capsule Take  by mouth daily.       ascorbic acid, vitamin C, (VITAMIN C) 500 mg tablet Take 500 mg by mouth daily.  [DISCONTINUED] diazePAM (VALIUM) 5 mg tablet Take 1 Tab by mouth daily as needed for Anxiety. 30 Tab 0    [DISCONTINUED] fenofibrate nanocrystallized (TRICOR) 48 mg tablet Take 1 Tab by mouth daily. 90 Tab 1    [DISCONTINUED] hydroCHLOROthiazide (HYDRODIURIL) 25 mg tablet TAKE 1 TABLET BY MOUTH DAILY 90 Tab 1    [DISCONTINUED] butalbital-acetaminophen-caffeine (FIORICET, ESGIC) -40 mg per tablet Take 1 Tab by mouth every six (6) hours as needed for Pain or Headache. Max Daily Amount: 4 Tabs. 90 Tab 0    [DISCONTINUED] diazepam (VALIUM) 5 mg tablet Take 1 tablet daily as needed for migraine 90 Tab 0     No facility-administered encounter medications on file as of 6/5/2017. Allergies   Allergen Reactions    Other Medication Other (comments)     PT HAS A RARE BLOOD TYPE - O NEG WITH ANTI M- ANTIBODIES.  Codeine Nausea Only     hives        Social History     Social History    Marital status: SINGLE     Spouse name: N/A    Number of children: N/A    Years of education: N/A     Occupational History    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 1/2 ppd for 40 years or so    Alcohol use 0.5 - 1.0 oz/week     1 - 2 Cans of beer per week      Comment: only weekends/social    Drug use: No    Sexual activity: No     Other Topics Concern    Weight Concern Yes     she feels better at 125 lbs - weight gain w/ gyn surg    Exercise Yes     3-5 miles 4 days per week and enjoys dancing! Social History Narrative    . No children. Works full time for [employer],  since 2013. She lives independent at a condo in [facility]. Does have HCP but can't remember name. Review of Systems   Constitutional: Negative for weight loss. Eyes: Negative for blurred vision. Respiratory: Negative for shortness of breath. Cardiovascular: Negative for chest pain. Gastrointestinal: Negative for abdominal pain. Genitourinary: Negative for dysuria and frequency. Skin: Negative for rash. Neurological: Negative for dizziness, weakness and headaches. Physical Exam   Constitutional: She appears well-developed and well-nourished. She is active. Non-toxic appearance. She does not have a sickly appearance. She does not appear ill. No distress. Eyes: Conjunctivae are normal.   Cardiovascular: Normal rate, regular rhythm, S1 normal, S2 normal, normal heart sounds and normal pulses. Exam reveals no gallop and no friction rub. Pulmonary/Chest: Effort normal and breath sounds normal. No respiratory distress. Abdominal: Soft. Bowel sounds are normal.   Musculoskeletal: She exhibits no edema or deformity. Neurological: She is alert. Skin: Skin is warm and dry. No rash noted. No pallor. Psychiatric: She has a normal mood and affect.  Her behavior is normal.      Ortho Exam       Orders Placed This Encounter    CT LOW DOSE LUNG CANCER SCREENING     Standing Status:   Future     Standing Expiration Date:   6/6/2018     Order Specific Question:   Reason for exam     Answer:   Lung Cancer Screening     Order Specific Question:   Smoking Status     Answer:   Current Smoker     Order Specific Question:   Smoking history; number of pack-years     Answer:   48     Order Specific Question:   The patient age is 50-69 years     Answer:   Yes     Order Specific Question:   The patient is asymptomatic (no signs/symptoms of lung cancer)     Answer:   Yes     Order Specific Question:   The patient has participated in a shared decision making session during which potential risks and benefits of CT lung screening were discussed     Answer:   Yes     Order Specific Question:   The patient was informed of the importance of adherence to annual screening, impact of comorbidities and ability/willingness to undergo diagnosis and treatment     Answer:   Yes     Order Specific Question:   The patient was informed of the importance of smoking cessation and/or maintaining smoking abstinence, including the offer of Medicare-covered tobacco cessation counseling services, if applicable     Answer: Yes    aspirin delayed-release 81 mg tablet     Sig: Take 1 Tab by mouth daily. Dispense:  30 Tab     Refill:  12    hydroCHLOROthiazide (HYDRODIURIL) 25 mg tablet     Sig: TAKE 1 TABLET BY MOUTH DAILY     Dispense:  90 Tab     Refill:  3    butalbital-acetaminophen-caffeine (FIORICET, ESGIC) -40 mg per tablet     Sig: Take 1 Tab by mouth daily as needed for Pain or Headache. Dispense:  30 Tab     Refill:  0    DISCONTD: diazePAM (VALIUM) 5 mg tablet     Sig: Take 1 Tab by mouth daily as needed for Anxiety. Dispense:  30 Tab     Refill:  0        I have reviewed the patient's medical history in detail and updated the computerized patient record. We had a prolonged discussion about these complex clinical issues and went over the various important aspects to consider. All questions were answered. Advised her to call back or return to office if symptoms do not improve, change in nature, or persist.    She was given an after visit summary or informed of [employer] Access which includes patient instructions, diagnoses, current medications, & vitals. She expressed understanding with the diagnosis and plan.

## 2017-06-06 ENCOUNTER — PATIENT MESSAGE (OUTPATIENT)
Dept: INTERNAL MEDICINE CLINIC | Age: 67
End: 2017-06-06

## 2017-06-06 RX ORDER — SIMVASTATIN 40 MG/1
40 TABLET, FILM COATED ORAL
Qty: 90 TAB | Refills: 3 | Status: SHIPPED | OUTPATIENT
Start: 2017-06-06 | End: 2018-06-04 | Stop reason: SDUPTHER

## 2017-06-06 NOTE — TELEPHONE ENCOUNTER
From: Elham Shukla  Sent: 6/6/2017 1:58 PM EDT  To: Oscar Vera MD  Subject: RE: Test Results Question    Thank you. I have 2 weeks worth of Zocor 20 mg. Will need a refill. Can I get 40 mg instead of taking 20 mg? Yassine Diego  ----- Message -----  From: Oscar Vera MD  Sent: 6/6/2017 12:18 PM EDT  To: Elham Shukla  Subject: RE: Test Results Question    Your triglycerides are 199. It is ok to stay off tricor. Lab Results   Component Value Date/Time    Cholesterol, total 162 06/02/2017 12:00 AM    HDL Cholesterol 43 06/02/2017 12:00 AM    LDL, calculated 79 06/02/2017 12:00 AM    VLDL, calculated 40 06/02/2017 12:00 AM    Triglyceride 199 06/02/2017 12:00 AM     Your bad cholesterol is also very good. I would just stay on the 40 mg of simvastatin. Your liver tests are normal.        ----- Message -----   From: Elham Shukla   Sent: 6/6/2017 6:58 AM EDT   To: Reena Quiroga NP  Subject: Test Results Question    Good Morning Dericlandon Hall,    I got the messages about my blood tests but cannot open them to see the results. Can you let me know how everything is - back on Tricor? or let me know how to open the tests results. Haven't had a problem looking at them in the past - has something changed?     Thanks, Yassine Diego

## 2017-06-08 ENCOUNTER — HOSPITAL ENCOUNTER (OUTPATIENT)
Dept: CT IMAGING | Age: 67
Discharge: HOME | End: 2017-06-08

## 2017-06-08 DIAGNOSIS — Z87.891 PERSONAL HISTORY OF NICOTINE DEPENDENCE: ICD-10-CM

## 2017-06-08 DIAGNOSIS — F17.200 SMOKING: ICD-10-CM

## 2017-06-08 PROBLEM — R91.1 SOLITARY PULMONARY NODULE ON LUNG CT: Status: ACTIVE | Noted: 2017-06-08

## 2017-06-08 NOTE — PROGRESS NOTES
Your CT did not show any masses. They did see a nodule but the radiologist described it as inflammatory or infectious. Considering you are not having symptoms of infection, I would not be concerned about this. We should do these every year though to compare.   Signed electronically by: Domitila Noriega MD at 10:59 AM on June 8, 2017

## 2017-07-27 ENCOUNTER — TELEPHONE (OUTPATIENT)
Dept: INTERNAL MEDICINE CLINIC | Age: 67
End: 2017-07-27

## 2017-07-27 NOTE — TELEPHONE ENCOUNTER
Chaparro Child 1950     Pt called stating that she is having kelley L lower/middle back pain. Pt states that it is better when she stands. Pt would like a call back to discuss and to [facility] if she needs to come in to be seen.

## 2017-07-27 NOTE — TELEPHONE ENCOUNTER
Pt walked into the office at approximately 10:30 today requesting to be seen. She was advised by front staff that with a few of the providers out of the office today we did not have available appointments but would gladly get her scheduled with the [facility] to be seen ASA. Pt yelled at [facility] stating \"How dare yall not have availability & try to send me somewhere else. \" Abida Ortiz refused to leave the office until she was seen. I spoke with Ms. Bri Shukla myself to rectify the situation. She stated that she started to experience L side lower back pain this morning that \"comes & goes\" taking her breath away. I reiterated that we did not have available appointments for her to be seen within our practice today but advised I was willing to get her scheduled & evaluated by the [facility] clinic today if she agreed. Pt became very upset & loud stating \"Let me tell you something. I have been coming to this office for 20 years & you are telling me that one of these providers can not take time out of their day to see me? Dr. Berman Rather would see me at the drop of a hat! \" She was informed that all providers within the practice, at the current time, have filled their appointments for the day. Ms. Bri Shukla then became infuriated yelling \"I want relief right now! \" Her concerns were acknowledged and she was informed that in order for her to get treatment she would need to be evaluated by a provider. Another offer was made for the pt to be scheduled at the [facility] but pt walked out of the rooming stating \"I'll find a new provider and get my care elsewhere. \"

## 2017-09-15 ENCOUNTER — DOCUMENTATION ONLY (OUTPATIENT)
Dept: INTERNAL MEDICINE CLINIC | Age: 67
End: 2017-09-15

## 2017-09-15 DIAGNOSIS — G43.909 MIGRAINE WITHOUT STATUS MIGRAINOSUS, NOT INTRACTABLE, UNSPECIFIED MIGRAINE TYPE: ICD-10-CM

## 2017-09-15 DIAGNOSIS — Z86.69 HX OF MIGRAINES: ICD-10-CM

## 2017-09-15 NOTE — PROGRESS NOTES
Returned patient's call in reference to prescription request for Valium being refused. I told patient that she had told Dr. Darline Brush that she would eliminate the Valium and only take Fiorinal for her migraine headaches. Patient stated that she had tried and was not getting the same results as she had when taking the Valium and Fiorinal.  I suggested that she find another primary care physician outside of our Practice and would assist her with on Monday. Call ended in a positive manner.

## 2017-09-18 DIAGNOSIS — G43.909 MIGRAINE WITHOUT STATUS MIGRAINOSUS, NOT INTRACTABLE, UNSPECIFIED MIGRAINE TYPE: ICD-10-CM

## 2017-09-18 RX ORDER — BUTALBITAL, ASPIRIN, AND CAFFEINE 325; 50; 40 MG/1; MG/1; MG/1
1 CAPSULE ORAL DAILY
Qty: 30 CAP | Refills: 0 | Status: SHIPPED | OUTPATIENT
Start: 2017-09-18 | End: 2019-06-23

## 2017-09-18 RX ORDER — BUTALBITAL, ACETAMINOPHEN AND CAFFEINE 50; 325; 40 MG/1; MG/1; MG/1
TABLET ORAL
Qty: 30 TAB | Refills: 0 | Status: SHIPPED | OUTPATIENT
Start: 2017-09-18

## 2017-09-18 RX ORDER — BUTALBITAL, ASPIRIN, CAFFEINE AND CODEINE PHOSPHATE 50; 325; 40; 30 MG/1; MG/1; MG/1; MG/1
1 CAPSULE ORAL
OUTPATIENT
Start: 2017-09-18

## 2017-09-18 NOTE — TELEPHONE ENCOUNTER
According to , she has never had a prescription for fiornal.  A pharmacy can't change it from fiorciet to fiornal on their own. Adding codeine is making it a controlled substance. I rather her take the fiorciet.    Is the one saying that the pharmacy is switching her to the fiorinal?

## 2017-09-18 NOTE — TELEPHONE ENCOUNTER
Patient called because the pharmacy received a script for fioricet instead of Fiorinal. The Fioricet is what we have listed in the patient's chart. She said that the pharmacy has been changing it to fiorinal for her since that is what she had been taking in the past.The last time an actual script waa written for fiorinal was 05/24/2016. She would like this corrected in her chart and a new script for fiorinal sent to the pharmacy.

## 2017-09-19 ENCOUNTER — DOCUMENTATION ONLY (OUTPATIENT)
Dept: INTERNAL MEDICINE CLINIC | Age: 67
End: 2017-09-19

## 2017-09-19 ENCOUNTER — TELEPHONE (OUTPATIENT)
Dept: INTERNAL MEDICINE CLINIC | Age: 67
End: 2017-09-19

## 2017-09-19 NOTE — PROGRESS NOTES
Documentation is from events that occurred yesterday. Patient called to say she was upset because she had requested Fiorinal and Dr. Carley Boeck prescribed Fioricet yesterday. Patient stated that prescription written in June for [id number] [facility] also was changed to Fiorinal by pharmacist at [facility]. Patient again noted her dissatisfaction with provider. After speaking to Dr. Carley Boeck, the prescription was changed to Fiorinal and Caroline phoned it in to [facility]. I then called and let patient know that script was at pharmacy.

## 2017-09-19 NOTE — PROGRESS NOTES
Patient today requested Fioricet instead of the Fiorinal ordered yesterday due to her need tablets and not capsules. Called pharmacy ok'd refill per Dr. Kassidy Llanes. Called patient and let her know it was ready and referred her to Dr. Hamilton Walter as it appears that we are unable to meet her expectations as her primary care provider.

## 2017-11-10 ENCOUNTER — HOSPITAL ENCOUNTER (OUTPATIENT)
Dept: MAMMOGRAPHY | Age: 67
Discharge: HOME | End: 2017-11-10
Attending: OBSTETRICS & GYNECOLOGY
Payer: MEDICARE

## 2017-11-10 DIAGNOSIS — Z12.31 VISIT FOR SCREENING MAMMOGRAM: ICD-10-CM

## 2017-11-10 PROCEDURE — 77063 BREAST TOMOSYNTHESIS BI: CPT

## 2017-11-13 NOTE — PROGRESS NOTES
IMPRESSION:  BI-RADS 1: Negative. No mammographic evidence of malignancy.     RECOMMENDATIONS:  Next screening mammogram is recommended in one year.      The patient will be notified of these results.

## 2018-06-05 RX ORDER — SIMVASTATIN 40 MG/1
TABLET, FILM COATED ORAL
Qty: 90 TAB | Refills: 0 | Status: SHIPPED | OUTPATIENT
Start: 2018-06-05 | End: 2018-09-06 | Stop reason: SDUPTHER

## 2018-06-05 NOTE — TELEPHONE ENCOUNTER
I only saw her once and she was supposed to have a follow up in 6 months. I need to see her for more refills after this. She would need labs prior.

## 2018-06-08 NOTE — TELEPHONE ENCOUNTER
Left voicemail message for patient advising to contact pharmacy to update pcp or call our office back to schedule follow up with lab work prior

## 2018-11-12 ENCOUNTER — HOSPITAL ENCOUNTER (OUTPATIENT)
Dept: MAMMOGRAPHY | Age: 68
Discharge: HOME | End: 2018-11-12
Payer: MEDICARE

## 2018-11-12 DIAGNOSIS — Z12.39 SCREENING BREAST EXAMINATION: ICD-10-CM

## 2018-11-12 PROCEDURE — 77067 SCR MAMMO BI INCL CAD: CPT

## 2019-01-07 ENCOUNTER — HOSPITAL ENCOUNTER (OUTPATIENT)
Dept: MAMMOGRAPHY | Age: 69
Discharge: HOME | End: 2019-01-07
Payer: MEDICARE

## 2019-01-07 DIAGNOSIS — M81.0 OSTEOPOROSIS: ICD-10-CM

## 2019-01-07 PROCEDURE — 77080 DXA BONE DENSITY AXIAL: CPT

## 2019-06-23 ENCOUNTER — HOSPITAL ENCOUNTER (EMERGENCY)
Age: 69
Discharge: HOME | End: 2019-06-23
Attending: EMERGENCY MEDICINE
Payer: MEDICARE

## 2019-06-23 VITALS
RESPIRATION RATE: 16 BRPM | WEIGHT: 135.36 LBS | SYSTOLIC BLOOD PRESSURE: 178 MMHG | DIASTOLIC BLOOD PRESSURE: 93 MMHG | BODY MASS INDEX: 24.76 KG/M2 | TEMPERATURE: 97.2 F | HEART RATE: 83 BPM | OXYGEN SATURATION: 98 %

## 2019-06-23 DIAGNOSIS — J01.10 ACUTE NON-RECURRENT FRONTAL SINUSITIS: Primary | ICD-10-CM

## 2019-06-23 PROCEDURE — 99282 EMERGENCY DEPT VISIT SF MDM: CPT

## 2019-06-23 RX ORDER — GEMFIBROZIL 600 MG/1
TABLET, FILM COATED ORAL
Refills: 1 | COMMUNITY
Start: 2019-05-22

## 2019-06-23 RX ORDER — DIAZEPAM 5 MG/1
TABLET ORAL
Refills: 5 | COMMUNITY
Start: 2019-06-19

## 2019-06-23 NOTE — DISCHARGE INSTRUCTIONS
Patient Education        Sinusitis: Care Instructions  Your Care Instructions    Sinusitis is an infection of the lining of the sinus cavities in your head. Sinusitis often follows a cold. It causes pain and pressure in your head and face. In most cases, sinusitis gets better on its own in 1 to 2 weeks. But some mild symptoms may last for several weeks. Sometimes antibiotics are needed. Follow-up care is a key part of your treatment and safety. Be sure to make and go to all appointments, and call your doctor if you are having problems. It's also a good idea to know your test results and keep a list of the medicines you take. How can you care for yourself at home? · Take an over-the-counter pain medicine, such as acetaminophen (Tylenol), ibuprofen (Advil, Motrin), or naproxen (Aleve). Read and follow all instructions on the label. · If the doctor prescribed antibiotics, take them as directed. Do not stop taking them just because you feel better. You need to take the full course of antibiotics. · Be careful when taking over-the-counter cold or flu medicines and Tylenol at the same time. Many of these medicines have acetaminophen, which is Tylenol. Read the labels to make sure that you are not taking more than the recommended dose. Too much acetaminophen (Tylenol) can be harmful. · Breathe warm, moist air from a steamy shower, a hot bath, or a sink filled with hot water. Avoid cold, dry air. Using a humidifier in your home may help. Follow the directions for cleaning the machine. · Use saline (saltwater) nasal washes to help keep your nasal passages open and wash out mucus and bacteria. You can buy saline nose drops at a grocery store or drugstore. Or you can make your own at home by adding 1 teaspoon of salt and 1 teaspoon of baking soda to 2 cups of distilled water. If you make your own, fill a bulb syringe with the solution, insert the tip into your nostril, and squeeze gently. Maura Males your nose.   · Put a hot, wet towel or a warm gel pack on your face 3 or 4 times a day for 5 to 10 minutes each time. · Try a decongestant nasal spray like oxymetazoline (Afrin). Do not use it for more than 3 days in a row. Using it for more than 3 days can make your congestion worse. When should you call for help? Call your doctor now or seek immediate medical care if:    · You have new or worse swelling or redness in your face or around your eyes.     · You have a new or higher fever.    Watch closely for changes in your health, and be sure to contact your doctor if:    · You have new or worse facial pain.     · The mucus from your nose becomes thicker (like pus) or has new blood in it.     · You are not getting better as expected. Where can you learn more? Go to http://douglas-juany.info/. Enter P969 in the search box to learn more about \"Sinusitis: Care Instructions. \"  Current as of: March 27, 2018  Content Version: 11.9  © 7103-9564 [employer]. Care instructions adapted under license by [employer] (which disclaims liability or warranty for this information). If you have questions about a medical condition or this instruction, always ask your healthcare professional. [address] any warranty or liability for your use of this information.

## 2019-06-23 NOTE — ED TRIAGE NOTES
Triage Note: Patient arrives to ER complaining of nasal congestion that started at 3am yesterday. Started Mucinex yesterday and felt better. States she has been feeling increasingly tired. States she is \"susceptible to sinus infections. \"

## 2019-06-23 NOTE — ED PROVIDER NOTES
The history is provided by the patient. No  was used. Nasal Congestion   This is a new problem. The current episode started yesterday. The problem occurs constantly. The problem has not changed since onset. Pertinent negatives include no chest pain, no abdominal pain, no headaches and no shortness of breath. Nothing aggravates the symptoms. Nothing relieves the symptoms. The treatment provided no relief.         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    Benign essential hypertension     Blood type O- 10/2014    with anti-M antibodies    Diverticulitis 8/3/2010    Follow up colonoscopy neg    Diverticulosis 03/10/2017    Fatty liver disease, nonalcoholic 8/3/2490    H/O colonoscopy 03/10/2017    History of esophageal ulcer (age 5)    has not recurred    History of ganglion cyst     left wrist - removed    History of shingles 2016    Hypercholesterolemia with hypertriglyceridemia 2005    controlled on medication    Hypovitaminosis D     Ill-defined condition     ESOPHAGEAL ULCER    Migraines     migraines    MVP (mitral valve prolapse)     Personal history of nicotine dependence  6/5/2017    Smoking 6/5/2017       Past Surgical History:   Procedure Laterality Date    ABDOMEN SURGERY PROC UNLISTED  y-20    tubal ligation    COLONOSCOPY N/A 3/10/2017    COLONOSCOPY performed by Aspen Gutierrez [facility] at [facility] ENDOSCOPY    HX APPENDECTOMY      Age 8    HX COLONOSCOPY  2010 2020 for next    HX GYN  1987    BTL    HX GYN  10/2014    Lap JUNO/BSO    HX ORTHOPAEDIC  1976    L wrist ganglion cyst    HX TONSILLECTOMY      IL CYSTOURETHROSCOPY  10/9/2014     performed by Johnny Denis MD at [address] [place] [address] <=250 GRAM  W TUBE/OVARY N/A 10/9/2014    ROBOTIC ASSISTED TOTAL LAPAROSCOPIC HYSTERECTOMY / BILATERAL SALPINGO OOPHORECTOMY / PARTIAL PARACERVICAL VAGINECTOMY, CYSTOSCOPY performed by Emery Lowe MD at [facility] MAIN OR         Family History:   Problem Relation Age of Onset    Alzheimer Mother 79    Arthritis-osteo Mother     Heart Disease Father 46    Diabetes Father     Heart Attack Father 46    Alzheimer Sister     High Cholesterol Sister     Hypertension Maternal Grandmother     Stroke Maternal Grandmother     Stroke Maternal Grandfather     Other Brother         brain disorder       Social History     Socioeconomic History    Marital status: SINGLE     Spouse name: Not on file    Number of children: Not on file    Years of education: Not on file    Highest education level: Not on file   Occupational History    Occupation: Administration specialists     Employer: [employer] Financial resource strain: Not on file    Food insecurity:     Worry: Not on file     Inability: Not on file    Transportation needs:     Medical: Not on file     Non-medical: Not on file   Tobacco Use    Smoking status: Current Every Day Smoker     Years: 40.00    Smokeless tobacco: Never Used    Tobacco comment: 3 cigarettes/ day current. 1/2 ppd for 40 years or so   Substance and Sexual Activity    Alcohol use:  Yes     Alcohol/week: 0.5 - 1.0 oz     Types: 1 - 2 Cans of beer per week     Comment: only weekends/social    Drug use: No    Sexual activity: Never     Partners: Male     Birth control/protection: Condom   Lifestyle    Physical activity:     Days per week: Not on file     Minutes per session: Not on file    Stress: Not on file   Relationships    Social connections:     Talks on phone: Not on file     Gets together: Not on file     Attends Mosque service: Not on file     Active member of club or organization: Not on file     Attends meetings of clubs or organizations: Not on file     Relationship status: Not on file    Intimate partner violence:     Fear of current or ex partner: Not on file     Emotionally abused: Not on file Physically abused: Not on file     Forced sexual activity: Not on file   Other Topics Concern     Service Not Asked    Blood Transfusions Not Asked    Caffeine Concern Not Asked    Occupational Exposure Not Asked    Hobby Hazards Not Asked    Sleep Concern Not Asked    Stress Concern Not Asked    Weight Concern Yes     Comment: she feels better at 125 lbs - weight gain w/ gyn surg    Special Diet Not Asked    Back Care Not Asked    Exercise Yes     Comment: 3-5 miles 4 days per week and enjoys dancing!  Bike Helmet Not Asked    Seat Belt Not Asked    Self-Exams Not Asked   Social History Narrative    . No children. Works full time for [employer],  since 2013. She lives independent at a condo in [place]. Does have HCP but can't remember name. ALLERGIES: Other medication and Codeine    Review of Systems   Constitutional: Negative for activity change, chills and fever. HENT: Positive for congestion, postnasal drip and sinus pressure. Negative for nosebleeds, sore throat, trouble swallowing and voice change. Eyes: Negative for visual disturbance. Respiratory: Negative for shortness of breath. Cardiovascular: Negative for chest pain and palpitations. Gastrointestinal: Negative for abdominal pain, constipation, diarrhea and nausea. Genitourinary: Negative for difficulty urinating, dysuria, hematuria and urgency. Musculoskeletal: Negative for back pain, neck pain and neck stiffness. Skin: Negative for color change. Allergic/Immunologic: Negative for immunocompromised state. Neurological: Negative for dizziness, seizures, syncope, weakness, light-headedness, numbness and headaches. Psychiatric/Behavioral: Negative for behavioral problems, confusion, hallucinations, self-injury and suicidal ideas.        Vitals:    06/23/19 1038   BP: (!) 178/93   Pulse: 83   Resp: 16   Temp: 97.2 °F (36.2 °C)   SpO2: 98%   Weight: 61.4 kg (135 lb 5.8 oz)            Physical Exam   Constitutional: She appears well-developed and well-nourished. No distress. HENT:   Head: Normocephalic and atraumatic. Right Ear: Hearing and tympanic membrane normal.   Left Ear: Hearing and tympanic membrane normal.   Nose: No mucosal edema or rhinorrhea. Right sinus exhibits frontal sinus tenderness. Right sinus exhibits no maxillary sinus tenderness. Left sinus exhibits frontal sinus tenderness. Left sinus exhibits no maxillary sinus tenderness. Mouth/Throat: Uvula is midline and oropharynx is clear and moist. No oropharyngeal exudate, posterior oropharyngeal edema or posterior oropharyngeal erythema. Eyes: EOM are normal.   Neck: No tracheal deviation present. Cardiovascular:   Warm and well perfused   Pulmonary/Chest: Effort normal. No respiratory distress. Musculoskeletal: Normal range of motion. Neurological: She is alert. Coordination normal.   Skin: Skin is warm and dry. She is not diaphoretic. Psychiatric: She has a normal mood and affect. Her behavior is normal. Judgment and thought content normal.   Nursing note and vitals reviewed. MDM     This is a 69-year-old female with past medical history, review of systems, physical exam as above, present with one day of post nasal drip, and sinus pressure. Patient states she has a history of frequent sinus infections. She states she spoke with her PCP by phone yesterday, who recommended decongestants, and nasal steroids, which the patient states she started using yesterday, receiving one to 2 doses of both. She denies fevers, cough, ear pain, headache, or visual disturbances. Physical exam is remarkable for well-appearing elderly female, in no acute distress, with tenderness to palpation over the bilateral frontal sinuses, mild tenderness in the maxillary sinuses, clear breath sounds, clear TMs bilaterally, unremarkable posterior pharynx. Suspect early sinusitis, likely viral in process. Discussed with patient decreasing tobacco use. Will encourage her to continue her current therapy, follow with her primary care physician, return precautions given.     Procedures

## 2019-11-20 ENCOUNTER — HOSPITAL ENCOUNTER (OUTPATIENT)
Dept: MAMMOGRAPHY | Age: 69
Discharge: HOME | End: 2019-11-20
Payer: MEDICARE

## 2019-11-20 DIAGNOSIS — Z12.31 VISIT FOR SCREENING MAMMOGRAM: ICD-10-CM

## 2019-11-20 PROCEDURE — 77067 SCR MAMMO BI INCL CAD: CPT

## 2020-12-04 ENCOUNTER — HOSPITAL ENCOUNTER (EMERGENCY)
Age: 70
Discharge: HOME | End: 2020-12-04
Attending: EMERGENCY MEDICINE
Payer: MEDICARE

## 2020-12-04 VITALS
RESPIRATION RATE: 18 BRPM | HEART RATE: 78 BPM | WEIGHT: 142.86 LBS | SYSTOLIC BLOOD PRESSURE: 145 MMHG | BODY MASS INDEX: 25.31 KG/M2 | HEIGHT: 63 IN | DIASTOLIC BLOOD PRESSURE: 80 MMHG | OXYGEN SATURATION: 98 % | TEMPERATURE: 97 F

## 2020-12-04 DIAGNOSIS — H57.11 PAIN OF RIGHT EYE: ICD-10-CM

## 2020-12-04 DIAGNOSIS — S05.01XA ABRASION OF RIGHT CORNEA, INITIAL ENCOUNTER: Primary | ICD-10-CM

## 2020-12-04 PROCEDURE — 99283 EMERGENCY DEPT VISIT LOW MDM: CPT

## 2020-12-04 PROCEDURE — 74011000250 HC RX REV CODE- 250: Performed by: EMERGENCY MEDICINE

## 2020-12-04 PROCEDURE — 99282 EMERGENCY DEPT VISIT SF MDM: CPT

## 2020-12-04 RX ORDER — TETRACAINE HYDROCHLORIDE 5 MG/ML
2 SOLUTION OPHTHALMIC
Status: DISCONTINUED | OUTPATIENT
Start: 2020-12-04 | End: 2020-12-04

## 2020-12-04 RX ORDER — ERYTHROMYCIN 5 MG/G
OINTMENT OPHTHALMIC
Qty: 3.5 G | Refills: 0 | Status: SHIPPED | OUTPATIENT
Start: 2020-12-04 | End: 2022-02-24

## 2020-12-04 RX ORDER — TETRACAINE HYDROCHLORIDE 5 MG/ML
2 SOLUTION OPHTHALMIC
Status: COMPLETED | OUTPATIENT
Start: 2020-12-04 | End: 2020-12-04

## 2020-12-04 RX ADMIN — FLUORESCEIN SODIUM 1 STRIP: 1 STRIP OPHTHALMIC at 11:28

## 2020-12-04 RX ADMIN — TETRACAINE HYDROCHLORIDE 2 DROP: 5 SOLUTION OPHTHALMIC at 11:29

## 2020-12-04 NOTE — ED TRIAGE NOTES
Triage: pt c/o right eye pain starting when she woke this AM. \"feels like needles\" in her eye \"when I close my eye. \" Denies dizziness, headache, vision changes.

## 2020-12-04 NOTE — DISCHARGE INSTRUCTIONS
Your eye pressure in both eyes was 21. You have what appears to be a linear abrasion to the right eye, No overt ulceration. Please see your eye doctor promptly.

## 2020-12-04 NOTE — ED PROVIDER NOTES
Date of Service:  12/4/2020    Patient:  Vinicius Blankenship    Chief Complaint:  Eye Pain       HPI:  Vinicius Blankenship is a 69 y.o.  female who presents for evaluation of right eye pain. Pain started acutely this morning when she was in the shower. No history of ocular issues. She states that she thinks her right eye may feel somewhat blurry but denies any true vision issues. She denies trauma to the eye or recent surgery. She does not wear contacts. She states that the pain feels like pins-and-needles in her eye, made worse with trying to look down.   Otherwise no other acute complaints           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    Benign essential hypertension     Blood type O- 10/2014    with anti-M antibodies    Diverticulitis 8/3/2010    Follow up colonoscopy neg    Diverticulosis 03/10/2017    Fatty liver disease, nonalcoholic 0/0/9586    H/O colonoscopy 03/10/2017    History of esophageal ulcer (age 5)    has not recurred    History of ganglion cyst     left wrist - removed    History of shingles 2016    Hypercholesterolemia with hypertriglyceridemia 2005    controlled on medication    Hypovitaminosis D     Ill-defined condition     ESOPHAGEAL ULCER    Migraines     migraines    MVP (mitral valve prolapse)     Personal history of nicotine dependence  6/5/2017    Smoking 6/5/2017       Past Surgical History:   Procedure Laterality Date    ABDOMEN SURGERY PROC UNLISTED  y-20    tubal ligation    COLONOSCOPY N/A 3/10/2017    COLONOSCOPY performed by Guerda Loyd [facility] at [facility]OSCOPY    HX APPENDECTOMY      Age 8    HX COLONOSCOPY  2010 2020 for next    HX GYN  1987    BTL    HX GYN  10/2014    Lap JUNO/BSO    HX ORTHOPAEDIC  1976    L wrist ganglion cyst    HX TONSILLECTOMY      VT CYSTOURETHROSCOPY  10/9/2014     performed by Sofía Kim MD at [address] [place] [address] W TOT HYSTERECTUTERUS <=250 GRAM  W TUBE/OVARY N/A 10/9/2014    ROBOTIC ASSISTED TOTAL LAPAROSCOPIC HYSTERECTOMY / BILATERAL SALPINGO OOPHORECTOMY / PARTIAL PARACERVICAL VAGINECTOMY, CYSTOSCOPY performed by Margaret Moy MD at [facility] MAIN OR         Family History:   Problem Relation Age of Onset    Alzheimer Mother 79    Arthritis-osteo Mother     Heart Disease Father 46    Diabetes Father     Heart Attack Father 46    Alzheimer Sister     High Cholesterol Sister     Hypertension Maternal Grandmother     Stroke Maternal Grandmother     Stroke Maternal Grandfather     Other Brother         brain disorder       Social History     Socioeconomic History    Marital status: SINGLE     Spouse name: Not on file    Number of children: Not on file    Years of education: Not on file    Highest education level: Not on file   Occupational History    Occupation: Administration specialists     Employer: [employer] Financial resource strain: Not on file    Food insecurity     Worry: Not on file     Inability: Not on file   [employer] needs     Medical: Not on file     Non-medical: Not on file   Tobacco Use    Smoking status: Current Every Day Smoker     Years: 40.00    Smokeless tobacco: Never Used    Tobacco comment: 3 cigarettes/ day current. 1/2 ppd for 40 years or so   Substance and Sexual Activity    Alcohol use:  Yes     Alcohol/week: 0.8 - 1.7 standard drinks     Types: 1 - 2 Cans of beer per week     Comment: only weekends/social    Drug use: No    Sexual activity: Never     Partners: Male     Birth control/protection: Condom   Lifestyle    Physical activity     Days per week: Not on file     Minutes per session: Not on file    Stress: Not on file   Relationships    Social connections     Talks on phone: Not on file     Gets together: Not on file     Attends Restorationism service: Not on file     Active member of club or organization: Not on file     Attends meetings of clubs or organizations: Not on file     Relationship status: Not on file    Intimate partner violence     Fear of current or ex partner: Not on file     Emotionally abused: Not on file     Physically abused: Not on file     Forced sexual activity: Not on file   Other Topics Concern     Service Not Asked    Blood Transfusions Not Asked    Caffeine Concern Not Asked    Occupational Exposure Not Asked    Hobby Hazards Not Asked    Sleep Concern Not Asked    Stress Concern Not Asked    Weight Concern Yes     Comment: she feels better at 125 lbs - weight gain w/ gyn surg    Special Diet Not Asked    Back Care Not Asked    Exercise Yes     Comment: 3-5 miles 4 days per week and enjoys dancing!  Bike Helmet Not Asked    Seat Belt Not Asked    Self-Exams Not Asked   Social History Narrative    . No children. Works full time for [employer],  since 2013. [facility] [place]. Does have HCP but can't remember name. ALLERGIES: Other medication and Codeine    Review of Systems   Constitutional: Negative for fever. HENT: Negative for hearing loss. Eyes: Positive for pain and visual disturbance. Negative for photophobia, discharge, redness and itching. Respiratory: Negative for shortness of breath. Cardiovascular: Negative for chest pain. Gastrointestinal: Negative for abdominal pain. Genitourinary: Negative for flank pain. Musculoskeletal: Negative for back pain. Skin: Negative for rash. Neurological: Negative for dizziness and light-headedness. Psychiatric/Behavioral: Negative for confusion. Vitals:    12/04/20 1115   BP: (!) 145/80   Pulse: 78   Resp: 18   Temp: 97 °F (36.1 °C)   SpO2: 98%   Weight: 64.8 kg (142 lb 13.7 oz)   Height: 5' 2.5\" (1.588 m)            Physical Exam  Vitals signs and nursing note reviewed. Constitutional:       General: She is not in acute distress.      Appearance: Normal appearance. She is not ill-appearing, toxic-appearing or diaphoretic. HENT:      Head: Normocephalic and atraumatic. Eyes:      General: Lids are normal. Lids are everted, no foreign bodies appreciated. Vision grossly intact. Gaze aligned appropriately. Right eye: No foreign body, discharge or hordeolum. Left eye: No foreign body. Intraocular pressure: Right eye pressure is 21 mmHg. Left eye pressure is 21 mmHg. Measurements were taken using a handheld tonometer. Conjunctiva/sclera: Conjunctivae normal.      Pupils:      Right eye: Corneal abrasion present. Funduscopic exam:     Right eye: No exudate. Left eye: No exudate. Neck:      Musculoskeletal: Normal range of motion. Cardiovascular:      Rate and Rhythm: Normal rate. Pulses: Normal pulses. Pulmonary:      Effort: Pulmonary effort is normal. No respiratory distress. Abdominal:      General: Abdomen is flat. Musculoskeletal:         General: No deformity. Skin:     General: Skin is warm. Capillary Refill: Capillary refill takes less than 2 seconds. Findings: No erythema. Neurological:      Mental Status: She is alert and oriented to person, place, and time. Psychiatric:         Mood and Affect: Mood normal.          MDM  Number of Diagnoses or Management Options  Abrasion of right cornea, initial encounter:   Pain of right eye:         VITAL SIGNS:  Patient Vitals for the past 4 hrs:   Temp Pulse Resp BP SpO2   12/04/20 1115 97 °F (36.1 °C) 78 18 (!) 145/80 98 %         LABS:  No results found for this or any previous visit (from the past 6 hour(s)).      IMAGING:  No orders to display         Medications During Visit:  Medications   fluorescein (FUL-MILAGRO) 1 mg ophthalmic strip 1 Strip (1 Strip Both Eyes Given by Provider 12/4/20 1120)   tetracaine HCl (PF) (PONTOCAINE) 0.5 % ophthalmic solution 2 Drop (2 Drops Right Eye Given by Provider 12/4/20 1123)         DECISION MAKING:  Graciela Chiang Kaushik Zamudio is a 69 y.o. female who comes in as above. Here, patient appears well. Eye grossly looks normal.  Under fluorescein staining there is a linear abrasion to the right eye. No obvious ulceration but this abrasion also is not continually linear its more of a linear tract of spots. No dendritic lesion noted. The rest of the exam is grossly unremarkable. At this time I will provide the patient with the medicines as below and have the patient call her eye doctor soon as she leaves here. She states that she will be able to get in touch today. Strict return precautions discussed. IMPRESSION:  1. Abrasion of right cornea, initial encounter    2. Pain of right eye        DISPOSITION:  Discharged      Discharge Medication List as of 12/4/2020 12:18 PM      START taking these medications    Details   erythromycin (ILOTYCIN) ophthalmic ointment 0.5inch ribbon to right eye 4 times a day for 1 week, Normal, Disp-3.5 g,R-0         CONTINUE these medications which have NOT CHANGED    Details   gemfibrozil (LOPID) 600 mg tablet TK 1 T PO D, Historical Med, R-1      diazePAM (VALIUM) 5 mg tablet TK 1 T PO BID PRN, Historical Med, R-5      simvastatin (ZOCOR) 40 mg tablet TAKE 1 TABLET BY MOUTH EVERY NIGHT, Normal, Disp-90 Tab, R-3      butalbital-acetaminophen-caffeine (FIORICET, ESGIC) -40 mg per tablet TAKE 1 TABLET BY MOUTH DAILY AS NEEDED FOR PAIN/ HEADACHE, Print, Disp-30 Tab, R-0      aspirin delayed-release 81 mg tablet Take 1 Tab by mouth daily. , No Print, Disp-30 Tab, R-12      cholecalciferol, vitamin D3, (VITAMIN D3) 2,000 unit tab Take 2,000 Units by mouth daily. , Historical Med      vitamin E (AQUA GEMS) 400 unit capsule Take  by mouth daily. , Historical Med      ascorbic acid, vitamin C, (VITAMIN C) 500 mg tablet Take 500 mg by mouth daily. , Historical Med              Follow-up Information     Follow up With Specialties Details Why Aidan Martinez MD Internal Medicine Schedule an appointment as soon as possible for a visit   [address]  [phone]      Your Specialist  Call today              The patient is asked to follow-up with their primary care provider in the next several days. They are to call tomorrow for an appointment. The patient is asked to return promptly for any increased concerns or worsening of symptoms. They can return to this emergency department or any other emergency department.       Procedures

## 2020-12-04 NOTE — Clinical Note
[address] 
[facility] EMERGENCY CTR 
[address] [address] 
[phone] Work/School Note Date: 12/4/2020 To Whom It May concern: 
 
 
Jonah Gutierrez was seen and treated today in the emergency room by the following provider(s): 
Attending Provider: Brian Perez is excused from work/school on 12/04/20. She is clear to return to work/school on 12/05/20. Sincerely, Josue Coates, DO

## 2020-12-07 ENCOUNTER — TRANSCRIBE ORDER (OUTPATIENT)
Dept: SCHEDULING | Age: 70
End: 2020-12-07

## 2020-12-07 DIAGNOSIS — Z12.31 VISIT FOR SCREENING MAMMOGRAM: Primary | ICD-10-CM

## 2020-12-23 ENCOUNTER — HOSPITAL ENCOUNTER (OUTPATIENT)
Dept: MAMMOGRAPHY | Age: 70
Discharge: HOME | End: 2020-12-23
Payer: MEDICARE

## 2020-12-23 DIAGNOSIS — Z12.31 VISIT FOR SCREENING MAMMOGRAM: ICD-10-CM

## 2020-12-23 PROCEDURE — 77067 SCR MAMMO BI INCL CAD: CPT

## 2021-06-24 ENCOUNTER — APPOINTMENT (OUTPATIENT)
Dept: GENERAL RADIOLOGY | Age: 71
End: 2021-06-24
Attending: EMERGENCY MEDICINE
Payer: MEDICARE

## 2021-06-24 ENCOUNTER — HOSPITAL ENCOUNTER (EMERGENCY)
Age: 71
Discharge: HOME | End: 2021-06-24
Attending: EMERGENCY MEDICINE
Payer: MEDICARE

## 2021-06-24 VITALS
TEMPERATURE: 98.5 F | BODY MASS INDEX: 26.57 KG/M2 | OXYGEN SATURATION: 96 % | HEIGHT: 62 IN | RESPIRATION RATE: 16 BRPM | WEIGHT: 144.4 LBS | DIASTOLIC BLOOD PRESSURE: 94 MMHG | SYSTOLIC BLOOD PRESSURE: 168 MMHG | HEART RATE: 58 BPM

## 2021-06-24 DIAGNOSIS — M62.830 BACK MUSCLE SPASM: Primary | ICD-10-CM

## 2021-06-24 LAB
ANION GAP SERPL CALC-SCNC: 10 MMOL/L (ref 5–15)
APPEARANCE UR: CLEAR
BACTERIA URNS QL MICRO: ABNORMAL /HPF
BASOPHILS # BLD: 0 K/UL (ref 0–0.1)
BASOPHILS NFR BLD: 1 % (ref 0–1)
BILIRUB UR QL: NEGATIVE
BUN SERPL-MCNC: 13 MG/DL (ref 6–20)
BUN/CREAT SERPL: 16 (ref 12–20)
CALCIUM SERPL-MCNC: 9.2 MG/DL (ref 8.5–10.1)
CHLORIDE SERPL-SCNC: 103 MMOL/L (ref 97–108)
CO2 SERPL-SCNC: 25 MMOL/L (ref 21–32)
COLOR UR: ABNORMAL
COMMENT, HOLDF: NORMAL
CREAT SERPL-MCNC: 0.79 MG/DL (ref 0.55–1.02)
DIFFERENTIAL METHOD BLD: NORMAL
EOSINOPHIL # BLD: 0.2 K/UL (ref 0–0.4)
EOSINOPHIL NFR BLD: 3 % (ref 0–7)
EPITH CASTS URNS QL MICRO: ABNORMAL /LPF
ERYTHROCYTE [DISTWIDTH] IN BLOOD BY AUTOMATED COUNT: 12.1 % (ref 11.5–14.5)
GLUCOSE SERPL-MCNC: 100 MG/DL (ref 65–100)
GLUCOSE UR STRIP.AUTO-MCNC: NEGATIVE MG/DL
HCT VFR BLD AUTO: 38.6 % (ref 35–47)
HGB BLD-MCNC: 12.8 G/DL (ref 11.5–16)
HGB UR QL STRIP: NEGATIVE
IMM GRANULOCYTES # BLD AUTO: 0 K/UL (ref 0–0.04)
IMM GRANULOCYTES NFR BLD AUTO: 0 % (ref 0–0.5)
KETONES UR QL STRIP.AUTO: NEGATIVE MG/DL
LEUKOCYTE ESTERASE UR QL STRIP.AUTO: NEGATIVE
LYMPHOCYTES # BLD: 1.6 K/UL (ref 0.8–3.5)
LYMPHOCYTES NFR BLD: 31 % (ref 12–49)
MCH RBC QN AUTO: 30.3 PG (ref 26–34)
MCHC RBC AUTO-ENTMCNC: 33.2 G/DL (ref 30–36.5)
MCV RBC AUTO: 91.3 FL (ref 80–99)
MONOCYTES # BLD: 0.5 K/UL (ref 0–1)
MONOCYTES NFR BLD: 10 % (ref 5–13)
MUCOUS THREADS URNS QL MICRO: ABNORMAL /LPF
NEUTS SEG # BLD: 2.9 K/UL (ref 1.8–8)
NEUTS SEG NFR BLD: 55 % (ref 32–75)
NITRITE UR QL STRIP.AUTO: NEGATIVE
NRBC # BLD: 0 K/UL (ref 0–0.01)
NRBC BLD-RTO: 0 PER 100 WBC
PH UR STRIP: 6 [PH] (ref 5–8)
PLATELET # BLD AUTO: 246 K/UL (ref 150–400)
PMV BLD AUTO: 10.1 FL (ref 8.9–12.9)
POTASSIUM SERPL-SCNC: 4.4 MMOL/L (ref 3.5–5.1)
PROT UR STRIP-MCNC: NEGATIVE MG/DL
RBC # BLD AUTO: 4.23 M/UL (ref 3.8–5.2)
RBC #/AREA URNS HPF: ABNORMAL /HPF (ref 0–5)
SAMPLES BEING HELD,HOLD: NORMAL
SODIUM SERPL-SCNC: 138 MMOL/L (ref 136–145)
SP GR UR REFRACTOMETRY: 1.01 (ref 1–1.03)
TROPONIN I SERPL-MCNC: <0.05 NG/ML
UA: UC IF INDICATED,UAUC: ABNORMAL
UROBILINOGEN UR QL STRIP.AUTO: 0.2 EU/DL (ref 0.2–1)
WBC # BLD AUTO: 5.3 K/UL (ref 3.6–11)
WBC URNS QL MICRO: ABNORMAL /HPF (ref 0–4)

## 2021-06-24 PROCEDURE — 74011250636 HC RX REV CODE- 250/636: Performed by: EMERGENCY MEDICINE

## 2021-06-24 PROCEDURE — 96374 THER/PROPH/DIAG INJ IV PUSH: CPT

## 2021-06-24 PROCEDURE — 81001 URINALYSIS AUTO W/SCOPE: CPT

## 2021-06-24 PROCEDURE — 74011000250 HC RX REV CODE- 250: Performed by: EMERGENCY MEDICINE

## 2021-06-24 PROCEDURE — 99285 EMERGENCY DEPT VISIT HI MDM: CPT

## 2021-06-24 PROCEDURE — 96372 THER/PROPH/DIAG INJ SC/IM: CPT

## 2021-06-24 PROCEDURE — 71046 X-RAY EXAM CHEST 2 VIEWS: CPT

## 2021-06-24 PROCEDURE — 85025 COMPLETE CBC W/AUTO DIFF WBC: CPT

## 2021-06-24 PROCEDURE — 80048 BASIC METABOLIC PNL TOTAL CA: CPT

## 2021-06-24 PROCEDURE — 93005 ELECTROCARDIOGRAM TRACING: CPT

## 2021-06-24 PROCEDURE — 84484 ASSAY OF TROPONIN QUANT: CPT

## 2021-06-24 PROCEDURE — 74011250637 HC RX REV CODE- 250/637: Performed by: EMERGENCY MEDICINE

## 2021-06-24 RX ORDER — MORPHINE SULFATE 4 MG/ML
4 INJECTION INTRAVENOUS
Status: COMPLETED | OUTPATIENT
Start: 2021-06-24 | End: 2021-06-24

## 2021-06-24 RX ORDER — KETOROLAC TROMETHAMINE 30 MG/ML
30 INJECTION, SOLUTION INTRAMUSCULAR; INTRAVENOUS ONCE
Status: COMPLETED | OUTPATIENT
Start: 2021-06-24 | End: 2021-06-24

## 2021-06-24 RX ORDER — LIDOCAINE 4 G/100G
1 PATCH TOPICAL EVERY 24 HOURS
Status: DISCONTINUED | OUTPATIENT
Start: 2021-06-24 | End: 2021-06-24 | Stop reason: HOSPADM

## 2021-06-24 RX ORDER — METHOCARBAMOL 750 MG/1
750 TABLET, FILM COATED ORAL 4 TIMES DAILY
Qty: 20 TABLET | Refills: 0 | Status: SHIPPED | OUTPATIENT
Start: 2021-06-24 | End: 2021-06-29

## 2021-06-24 RX ORDER — METHOCARBAMOL 750 MG/1
750 TABLET, FILM COATED ORAL
Status: COMPLETED | OUTPATIENT
Start: 2021-06-24 | End: 2021-06-24

## 2021-06-24 RX ADMIN — METHOCARBAMOL TABLETS 750 MG: 750 TABLET, COATED ORAL at 12:19

## 2021-06-24 RX ADMIN — KETOROLAC TROMETHAMINE 30 MG: 30 INJECTION, SOLUTION INTRAMUSCULAR; INTRAVENOUS at 12:19

## 2021-06-24 RX ADMIN — MORPHINE SULFATE 4 MG: 4 INJECTION INTRAVENOUS at 13:53

## 2021-06-24 NOTE — ED PROVIDER NOTES
69 yoF presenting to the ED for evaluation of back pain. Pt reports on Tuesday at work she lifted a large box at [employer]. They Yesterday she was lifting a large suitcase and then this morning she awoke with back pain and muscle spasm on her left side. Pain is sharp, focal in nature, non-radiating. No LUTS. Hx kidney stones in 1980s. Pain is improved if pt sits upright and pushes back against the stretcher.              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    Benign essential hypertension     Blood type O- 10/2014    with anti-M antibodies    Diverticulitis 8/3/2010    Follow up colonoscopy neg    Diverticulosis 03/10/2017    Fatty liver disease, nonalcoholic 1/7/8732    H/O colonoscopy 03/10/2017    History of esophageal ulcer (age 5)    has not recurred    History of ganglion cyst     left wrist - removed    History of shingles 2016    Hypercholesterolemia with hypertriglyceridemia 2005    controlled on medication    Hypovitaminosis D     Ill-defined condition     ESOPHAGEAL ULCER    Migraines     migraines    MVP (mitral valve prolapse)     Personal history of nicotine dependence  6/5/2017    Smoking 6/5/2017       Past Surgical History:   Procedure Laterality Date    COLONOSCOPY N/A 3/10/2017    COLONOSCOPY performed by Ella Camejo MD at [facility] ENDOSCOPY    HX APPENDECTOMY      Age 8    HX COLONOSCOPY  2010 2020 for next    HX GYN  1987    BTL    HX GYN  10/2014    Lap JUNO/BSO    HX ORTHOPAEDIC  1976    L wrist ganglion cyst    HX TONSILLECTOMY      CT ABDOMEN SURGERY PROC UNLISTED  y-20    tubal ligation    CT CYSTOURETHROSCOPY  10/9/2014     performed by Cy Ramos MD at [address] [place] [address] <=250 GRAM  W TUBE/OVARY N/A 10/9/2014    ROBOTIC ASSISTED TOTAL LAPAROSCOPIC HYSTERECTOMY / BILATERAL SALPINGO OOPHORECTOMY / PARTIAL PARACERVICAL VAGINECTOMY, CYSTOSCOPY performed by Mikey Galeano MD at [facility] MAIN OR         Family History:   Problem Relation Age of Onset    Alzheimer Mother 79    Arthritis-osteo Mother     Heart Disease Father 46    Diabetes Father     Heart Attack Father 46    Alzheimer Sister     High Cholesterol Sister     Hypertension Maternal Grandmother     Stroke Maternal Grandmother     Stroke Maternal Grandfather     Other Brother         brain disorder       Social History     Socioeconomic History    Marital status: SINGLE     Spouse name: Not on file    Number of children: Not on file    Years of education: Not on file    Highest education level: Not on file   Occupational History    Occupation: Administration specialists     Employer: [employer]   Tobacco Use    Smoking status: Current Every Day Smoker     Years: 40.00    Smokeless tobacco: Never Used    Tobacco comment: 3 cigarettes/ day current. 1/2 ppd for 40 years or so   Substance and Sexual Activity    Alcohol use: Yes     Alcohol/week: 0.8 - 1.7 standard drinks     Types: 1 - 2 Cans of beer per week     Comment: only weekends/social    Drug use: No    Sexual activity: Never     Partners: Male     Birth control/protection: Condom   Other Topics Concern     Service Not Asked    Blood Transfusions Not Asked    Caffeine Concern Not Asked    Occupational Exposure Not Asked    Hobby Hazards Not Asked    Sleep Concern Not Asked    Stress Concern Not Asked    Weight Concern Yes     Comment: she feels better at 125 lbs - weight gain w/ gyn surg    Special Diet Not Asked    Back Care Not Asked    Exercise Yes     Comment: 3-5 miles 4 days per week and enjoys dancing!  Bike Helmet Not Asked    Seat Belt Not Asked    Self-Exams Not Asked   Social History Narrative    . No children. Works full time for [employer],  since 2013. She lives independent at a [facility] in [place].         Does have HCP but can't remember name. Social Determinants of Health     Financial Resource Strain:     Difficulty of Paying Living Expenses:    Food Insecurity:     Worried About Running Out of Food in the Last Year:     [address] in the Last Year:    Transportation Needs:     Lack of Transportation (Medical):  Lack of Transportation (Non-Medical):    Physical Activity:     Days of Exercise per Week:     Minutes of Exercise per Session:    Stress:     Feeling of Stress :    Social Connections:     Frequency of Communication with Friends and Family:     Frequency of Social Gatherings with Friends and Family:     Attends Advent Services:     Active Member of Clubs or Organizations:     Attends Club or Organization Meetings:     Marital Status:    Intimate Partner Violence:     Fear of Current or Ex-Partner:     Emotionally Abused:     Physically Abused:     Sexually Abused: ALLERGIES: Other medication and Codeine    Review of Systems   Constitutional: Negative for chills and fever. HENT: Negative for congestion. Eyes: Negative for visual disturbance. Respiratory: Negative for chest tightness and shortness of breath. Cardiovascular: Negative for chest pain. Gastrointestinal: Negative for abdominal pain, nausea and vomiting. Genitourinary: Negative for dysuria and hematuria. Musculoskeletal: Positive for back pain. Skin: Negative for rash. Allergic/Immunologic: Negative for immunocompromised state. Neurological: Negative for dizziness, syncope, weakness, light-headedness and headaches. Psychiatric/Behavioral: Negative for confusion. Vitals:    06/24/21 1205 06/24/21 1207   BP: (!) 162/77 (!) 162/77   Pulse:  81   Resp:  18   Temp:  98.5 °F (36.9 °C)   SpO2: 97% 98%   Weight:  65.5 kg (144 lb 6.4 oz)   Height:  5' 2\" (1.575 m)            Physical Exam  Vitals and nursing note reviewed. Constitutional:       General: She is not in acute distress.      Appearance: She is well-developed. HENT:      Head: Normocephalic and atraumatic. Eyes:      General: No scleral icterus. Neck:      Trachea: No tracheal deviation. Cardiovascular:      Rate and Rhythm: Normal rate and regular rhythm. Heart sounds: Normal heart sounds. Pulmonary:      Effort: Pulmonary effort is normal. No respiratory distress. Breath sounds: Normal breath sounds. Abdominal:      General: There is no distension. Palpations: Abdomen is soft. Tenderness: There is no abdominal tenderness. Musculoskeletal:         General: Normal range of motion. Arms:    Skin:     General: Skin is warm and dry. Neurological:      General: No focal deficit present. Mental Status: She is alert and oriented to person, place, and time. GCS: GCS eye subscore is 4. GCS verbal subscore is 5. GCS motor subscore is 6. Cranial Nerves: No cranial nerve deficit. MDM  Number of Diagnoses or Management Options  Back muscle spasm  Diagnosis management comments: 69-year-old female no pertinent past medical history, presenting to the ED for evaluation of left-sided back pain after heavy lifting. Pain atypical for ACS. No red flag symptoms. Consistent with musculoskeletal pain. Will treat with lidocaine patch, Robaxin and Toradol and reassess. ED EKG interpretation: 1347  Rhythm: 1st degree block; and regular . Rate (approx.): 77; Axis: normal; P wave:prolonged; QRS interval: normal ; ST/T wave: normal; in  Lead: ALL; Other findings: normal.   EKG interpreted by Perla Altman ED MD.         Amount and/or Complexity of Data Reviewed  Clinical lab tests: ordered and reviewed  Tests in the radiology section of CPT®: ordered and reviewed      ED Course as of Jun 25 0944   Thu Jun 24, 2021   1304 Neg for blood. Pt feels medication is now kicking in. Will defer CT for stone at this time.    URINALYSIS W/ REFLEX CULTURE:    Color YELLOW/STRAW   Appearance CLEAR   Specific gravity 1.015   pH (UA) 6.0   Protein Negative   Glucose Negative   Ketone Negative   Bilirubin Negative   Blood Negative   Urobilinogen 0.2   Nitrites Negative   Leukocyte Esterase Negative   WBC PENDING   RBC PENDING   Epithelial cells PENDING   Bacteria PENDING   UA:UC IF INDICATED PENDING [SL]   1336 Patient has not responded as expected to reasonable treatment for muscle spasm. I have broadened her evaluation to include ACS evaluation including EKG and troponin, chest x-ray and will consider CT for renal stone if unrevealing.    [SL]   1500 Labs unrevealing and EKG remarkable.       [SL]   1505 Pt feeling a twinge of pain but much improved after morphine.      [SL]      ED Course User Index  [SL] Esthela Abrams MD       Procedures        Signed By: Noé Batista MD     June 25, 2021

## 2021-06-24 NOTE — ED TRIAGE NOTES
Pt arrives with back pain and spasms isolated to R upper/mid back after lifting a heavy suitcase into her car today.

## 2021-06-24 NOTE — Clinical Note
[facility] EMERGENCY CTR  [address]  [address]  [phone]    Work/School Note    Date: 6/24/2021    To Whom It May concern:    Chalo James was seen and treated today in the emergency room by the following provider(s):  Attending Provider: Candi Mast MD.      Chalo James is excused from work/school on 6/24/2021 through 6/27/2021. She is medically clear to return to work/school on 6/28/2021.         Sincerely,          Hilda Bentley MD

## 2021-06-25 ENCOUNTER — PATIENT OUTREACH (OUTPATIENT)
Dept: CASE MANAGEMENT | Age: 71
End: 2021-06-25

## 2021-06-25 LAB
ATRIAL RATE: 77 BPM
CALCULATED P AXIS, ECG09: 72 DEGREES
CALCULATED R AXIS, ECG10: 73 DEGREES
CALCULATED T AXIS, ECG11: 48 DEGREES
DIAGNOSIS, 93000: NORMAL
P-R INTERVAL, ECG05: 222 MS
Q-T INTERVAL, ECG07: 400 MS
QRS DURATION, ECG06: 86 MS
QTC CALCULATION (BEZET), ECG08: 452 MS
VENTRICULAR RATE, ECG03: 77 BPM

## 2021-06-25 NOTE — PROGRESS NOTES
6/25/2021  12:48 PM    Educated patient about risk for severe COVID-19 due to risk factors according to CDC guidelines. ACM reviewed discharge instructions, medical action plan and red flag symptoms with the patient who verbalized understanding. Discussed COVID vaccination status: Yes; pt reports that she received two doses in [facility] COVID-19 vaccine series (first dose 3/6, second dose 3/22) and is fully vaccinated. Education provided on COVID-19 vaccination as appropriate. Discussed exposure protocols and quarantine with CDC Guidelines. Patient was given an opportunity to verbalize any questions and concerns and agrees to contact ACM or health care provider for questions related to their healthcare. Patient reports that she does have f/u scheduled with her PCP.

## 2021-07-09 ENCOUNTER — PATIENT OUTREACH (OUTPATIENT)
Dept: CASE MANAGEMENT | Age: 71
End: 2021-07-09

## 2021-07-09 NOTE — PROGRESS NOTES
7/9/2021  11:13 AM    Patient resolved from Transition of Care episode on 7/9/2021. ACM/CTN was unsuccessful at contacting this patient today. Patient/family was provided the following resources and education related to COVID-19 during the initial call:                         Signs, symptoms and red flags related to COVID-19            CDC exposure and quarantine guidelines            [facility] exposure contact - [phone]            Contact for their local Department of Health                 Patient has not had any additional ED or hospital visits. No further outreach scheduled with this CTN/ACM. Episode of Care resolved. Patient has this CTN/ACM contact information if future needs arise.

## 2021-12-27 ENCOUNTER — TRANSCRIBE ORDER (OUTPATIENT)
Dept: SCHEDULING | Age: 71
End: 2021-12-27

## 2021-12-27 DIAGNOSIS — Z12.31 VISIT FOR SCREENING MAMMOGRAM: Primary | ICD-10-CM

## 2022-01-11 ENCOUNTER — TRANSCRIBE ORDER (OUTPATIENT)
Dept: SCHEDULING | Age: 72
End: 2022-01-11

## 2022-01-11 DIAGNOSIS — Z12.31 SCREENING MAMMOGRAM FOR HIGH-RISK PATIENT: Primary | ICD-10-CM

## 2022-01-13 ENCOUNTER — HOSPITAL ENCOUNTER (OUTPATIENT)
Dept: MAMMOGRAPHY | Age: 72
Discharge: HOME | End: 2022-01-13
Attending: INTERNAL MEDICINE
Payer: MEDICARE

## 2022-01-13 DIAGNOSIS — Z12.31 SCREENING MAMMOGRAM FOR HIGH-RISK PATIENT: ICD-10-CM

## 2022-01-13 PROCEDURE — 77067 SCR MAMMO BI INCL CAD: CPT

## 2022-02-24 ENCOUNTER — APPOINTMENT (OUTPATIENT)
Dept: GENERAL RADIOLOGY | Age: 72
End: 2022-02-24
Attending: PHYSICIAN ASSISTANT
Payer: MEDICARE

## 2022-02-24 ENCOUNTER — HOSPITAL ENCOUNTER (EMERGENCY)
Age: 72
Discharge: HOME | End: 2022-02-24
Attending: EMERGENCY MEDICINE
Payer: MEDICARE

## 2022-02-24 ENCOUNTER — APPOINTMENT (OUTPATIENT)
Dept: CT IMAGING | Age: 72
End: 2022-02-24
Attending: PHYSICIAN ASSISTANT
Payer: MEDICARE

## 2022-02-24 VITALS
RESPIRATION RATE: 16 BRPM | SYSTOLIC BLOOD PRESSURE: 141 MMHG | TEMPERATURE: 97.5 F | HEART RATE: 69 BPM | DIASTOLIC BLOOD PRESSURE: 70 MMHG | OXYGEN SATURATION: 97 %

## 2022-02-24 DIAGNOSIS — M54.50 ACUTE LEFT-SIDED LOW BACK PAIN WITHOUT SCIATICA: ICD-10-CM

## 2022-02-24 DIAGNOSIS — M62.838 MUSCLE SPASM: Primary | ICD-10-CM

## 2022-02-24 LAB
ALBUMIN SERPL-MCNC: 4.8 G/DL (ref 3.5–5)
ALBUMIN/GLOB SERPL: 1.8 {RATIO} (ref 1.1–2.2)
ALP SERPL-CCNC: 79 U/L (ref 45–117)
ALT SERPL-CCNC: 37 U/L (ref 12–78)
ANION GAP SERPL CALC-SCNC: 9 MMOL/L (ref 5–15)
APPEARANCE UR: CLEAR
AST SERPL-CCNC: 25 U/L (ref 15–37)
BACTERIA URNS QL MICRO: NEGATIVE /HPF
BASOPHILS # BLD: 0 K/UL (ref 0–0.1)
BASOPHILS NFR BLD: 1 % (ref 0–1)
BILIRUB SERPL-MCNC: 0.4 MG/DL (ref 0.2–1)
BILIRUB UR QL: NEGATIVE
BUN SERPL-MCNC: 15 MG/DL (ref 6–20)
BUN/CREAT SERPL: 18 (ref 12–20)
CALCIUM SERPL-MCNC: 9.3 MG/DL (ref 8.5–10.1)
CHLORIDE SERPL-SCNC: 102 MMOL/L (ref 97–108)
CO2 SERPL-SCNC: 28 MMOL/L (ref 21–32)
COLOR UR: NORMAL
CREAT SERPL-MCNC: 0.83 MG/DL (ref 0.55–1.02)
DIFFERENTIAL METHOD BLD: NORMAL
EOSINOPHIL # BLD: 0.1 K/UL (ref 0–0.4)
EOSINOPHIL NFR BLD: 2 % (ref 0–7)
EPITH CASTS URNS QL MICRO: NORMAL /LPF
ERYTHROCYTE [DISTWIDTH] IN BLOOD BY AUTOMATED COUNT: 12 % (ref 11.5–14.5)
GLOBULIN SER CALC-MCNC: 2.6 G/DL (ref 2–4)
GLUCOSE SERPL-MCNC: 105 MG/DL (ref 65–100)
GLUCOSE UR STRIP.AUTO-MCNC: NEGATIVE MG/DL
HCT VFR BLD AUTO: 39.7 % (ref 35–47)
HGB BLD-MCNC: 13.3 G/DL (ref 11.5–16)
HGB UR QL STRIP: NEGATIVE
IMM GRANULOCYTES # BLD AUTO: 0 K/UL (ref 0–0.04)
IMM GRANULOCYTES NFR BLD AUTO: 0 % (ref 0–0.5)
KETONES UR QL STRIP.AUTO: NEGATIVE MG/DL
LEUKOCYTE ESTERASE UR QL STRIP.AUTO: NEGATIVE
LIPASE SERPL-CCNC: 123 U/L (ref 73–393)
LYMPHOCYTES # BLD: 1.6 K/UL (ref 0.8–3.5)
LYMPHOCYTES NFR BLD: 32 % (ref 12–49)
MCH RBC QN AUTO: 30.6 PG (ref 26–34)
MCHC RBC AUTO-ENTMCNC: 33.5 G/DL (ref 30–36.5)
MCV RBC AUTO: 91.5 FL (ref 80–99)
MONOCYTES # BLD: 0.4 K/UL (ref 0–1)
MONOCYTES NFR BLD: 8 % (ref 5–13)
NEUTS SEG # BLD: 2.9 K/UL (ref 1.8–8)
NEUTS SEG NFR BLD: 57 % (ref 32–75)
NITRITE UR QL STRIP.AUTO: NEGATIVE
NRBC # BLD: 0 K/UL (ref 0–0.01)
NRBC BLD-RTO: 0 PER 100 WBC
PH UR STRIP: 6 [PH] (ref 5–8)
PLATELET # BLD AUTO: 235 K/UL (ref 150–400)
PMV BLD AUTO: 10.2 FL (ref 8.9–12.9)
POTASSIUM SERPL-SCNC: 4.2 MMOL/L (ref 3.5–5.1)
PROT SERPL-MCNC: 7.4 G/DL (ref 6.4–8.2)
PROT UR STRIP-MCNC: NEGATIVE MG/DL
RBC # BLD AUTO: 4.34 M/UL (ref 3.8–5.2)
RBC #/AREA URNS HPF: NORMAL /HPF (ref 0–5)
SODIUM SERPL-SCNC: 139 MMOL/L (ref 136–145)
SP GR UR REFRACTOMETRY: 1.01 (ref 1–1.03)
TROPONIN-HIGH SENSITIVITY: 8 NG/L (ref 0–51)
UR CULT HOLD, URHOLD: NORMAL
UROBILINOGEN UR QL STRIP.AUTO: 0.2 EU/DL (ref 0.2–1)
WBC # BLD AUTO: 5 K/UL (ref 3.6–11)
WBC URNS QL MICRO: NORMAL /HPF (ref 0–4)

## 2022-02-24 PROCEDURE — 71046 X-RAY EXAM CHEST 2 VIEWS: CPT

## 2022-02-24 PROCEDURE — 74011250637 HC RX REV CODE- 250/637: Performed by: PHYSICIAN ASSISTANT

## 2022-02-24 PROCEDURE — 74011000250 HC RX REV CODE- 250: Performed by: PHYSICIAN ASSISTANT

## 2022-02-24 PROCEDURE — 96374 THER/PROPH/DIAG INJ IV PUSH: CPT

## 2022-02-24 PROCEDURE — 80053 COMPREHEN METABOLIC PANEL: CPT

## 2022-02-24 PROCEDURE — 36415 COLL VENOUS BLD VENIPUNCTURE: CPT

## 2022-02-24 PROCEDURE — 81001 URINALYSIS AUTO W/SCOPE: CPT

## 2022-02-24 PROCEDURE — 93005 ELECTROCARDIOGRAM TRACING: CPT

## 2022-02-24 PROCEDURE — 84484 ASSAY OF TROPONIN QUANT: CPT

## 2022-02-24 PROCEDURE — 85025 COMPLETE CBC W/AUTO DIFF WBC: CPT

## 2022-02-24 PROCEDURE — 74176 CT ABD & PELVIS W/O CONTRAST: CPT

## 2022-02-24 PROCEDURE — 74011250636 HC RX REV CODE- 250/636: Performed by: PHYSICIAN ASSISTANT

## 2022-02-24 PROCEDURE — 99284 EMERGENCY DEPT VISIT MOD MDM: CPT

## 2022-02-24 PROCEDURE — 83690 ASSAY OF LIPASE: CPT

## 2022-02-24 RX ORDER — KETOROLAC TROMETHAMINE 30 MG/ML
15 INJECTION, SOLUTION INTRAMUSCULAR; INTRAVENOUS
Status: COMPLETED | OUTPATIENT
Start: 2022-02-24 | End: 2022-02-24

## 2022-02-24 RX ORDER — LIDOCAINE 4 G/100G
1 PATCH TOPICAL EVERY 24 HOURS
Status: DISCONTINUED | OUTPATIENT
Start: 2022-02-24 | End: 2022-02-24 | Stop reason: HOSPADM

## 2022-02-24 RX ORDER — METHOCARBAMOL 500 MG/1
500 TABLET, FILM COATED ORAL 3 TIMES DAILY
Qty: 6 TABLET | Refills: 0 | Status: SHIPPED | OUTPATIENT
Start: 2022-02-24 | End: 2022-02-26

## 2022-02-24 RX ORDER — DIAZEPAM 5 MG/1
5 TABLET ORAL
Status: COMPLETED | OUTPATIENT
Start: 2022-02-24 | End: 2022-02-24

## 2022-02-24 RX ADMIN — KETOROLAC TROMETHAMINE 15 MG: 30 INJECTION, SOLUTION INTRAMUSCULAR; INTRAVENOUS at 13:02

## 2022-02-24 RX ADMIN — DIAZEPAM 5 MG: 5 TABLET ORAL at 13:02

## 2022-02-24 NOTE — ED NOTES
Pt ambulatory out of ED with discharge instructions and prescriptions in hand given by Dr. Jenna Jones and Marquis Ramos., PA; pt verbalized understanding of discharge paperwork and time allotted for questions. VSS. Pt alert and oriented. Pt picked up from ER by male .

## 2022-02-24 NOTE — DISCHARGE INSTRUCTIONS
You have also been prescribed a muscle relaxer. This can have sedating side effects. As such, do not drive or operate heavy machinery while on this medication. Please do not also take with other sedating medications or alcohol as these effects can combine in an adverse manner. If you are taking this while working, please follow your company's protocols for taking a medication that is sedating, while on the job.

## 2022-02-24 NOTE — ED TRIAGE NOTES
Patient arrives with c/o left flank/ lower back pain that started this morning while getting ready for work. Denies injury. Took Advil with no relief. Denies numbness or tingling radiating down her legs.

## 2022-02-24 NOTE — ED PROVIDER NOTES
Caren Coffey is a 66 y.o. female with PMhx of diverticulitis, hypercholesterolemia, MVP, HTN, shingles, hepatic steatosis who presents to ED with cc of left back pain. Patient states she got up to go to work this morning was putting on her jacket when she had acute onset of left-sided back pain. 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 States she took 2 Advil around 10:00 without improvement. Denies radiation. Notes pain is worse with certain movements, also notes that pain seems to be somewhat improved with pressure. Pt denies additional symptoms of F/C, cough, congestion, CP, SOB, dysuria, urinary frequency, constipation, diarrhea, abdominal pain, nausea, vomiting. PMHx: Reviewed, as below. PSHx: Reviewed, as below. PCP: Danny Doe MD    There are no other complaints verbalized at this time.                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    Benign essential hypertension     Blood type O- 10/2014    with anti-M antibodies    Diverticulitis 8/3/2010    Follow up colonoscopy neg    Diverticulosis 03/10/2017    Fatty liver disease, nonalcoholic 4/3/4376    H/O colonoscopy 03/10/2017    History of esophageal ulcer (age 5)    has not recurred    History of ganglion cyst     left wrist - removed    History of shingles 2016    Hypercholesterolemia with hypertriglyceridemia 2005    controlled on medication    Hypovitaminosis D     Ill-defined condition     ESOPHAGEAL ULCER    Migraines     migraines    MVP (mitral valve prolapse)     Personal history of nicotine dependence  6/5/2017    Smoking 6/5/2017       Past Surgical History:   Procedure Laterality Date    COLONOSCOPY N/A 3/10/2017    [facility]    HX APPENDECTOMY      Age 8    HX COLONOSCOPY  2010 2020 for next    HX GYN  1987    BTL    HX GYN  10/2014    Lap JUNO/BSO    HX ORTHOPAEDIC  1976    L wrist ganglion cyst    HX TONSILLECTOMY      DC ABDOMEN SURGERY PROC UNLISTED  y-20    tubal ligation    DC CYSTOURETHROSCOPY  10/9/2014     performed by Louis Castro MD at [address] [place] [address] <=250 GRAM  W TUBE/OVARY N/A 10/9/2014    ROBOTIC ASSISTED TOTAL LAPAROSCOPIC HYSTERECTOMY / BILATERAL SALPINGO OOPHORECTOMY / PARTIAL PARACERVICAL VAGINECTOMY, CYSTOSCOPY performed by Louis Castro MD at [facility] MAIN OR         Family History:   Problem Relation Age of Onset    Alzheimer's Disease Mother 79    OSTEOARTHRITIS Mother     Heart Disease Father 46    Diabetes Father     Heart Attack Father 46    Alzheimer's Disease Sister     High Cholesterol Sister     Hypertension Maternal Grandmother     Stroke Maternal Grandmother     Stroke Maternal Grandfather     Other Brother         brain disorder       Social History     Socioeconomic History    Marital status: SINGLE     Spouse name: Not on file    Number of children: Not on file    Years of education: Not on file    Highest education level: Not on file   Occupational History    Occupation: Administration specialists     Employer: [employer]   Tobacco Use    Smoking status: Current Every Day Smoker     Years: 40.00    Smokeless tobacco: Never Used    Tobacco comment: 3 cigarettes/ day current. 1/2 ppd for 40 years or so   Substance and Sexual Activity    Alcohol use:  Yes     Alcohol/week: 0.8 - 1.7 standard drinks     Types: 1 - 2 Cans of beer per week     Comment: only weekends/social    Drug use: No    Sexual activity: Never     Partners: Male     Birth control/protection: Condom   Other Topics Concern     Service Not Asked    Blood Transfusions Not Asked    Caffeine Concern Not Asked    Occupational Exposure Not Asked    Hobby Hazards Not Asked    Sleep Concern Not Asked    Stress Concern Not Asked    Weight Concern Yes     Comment: she feels better at 125 lbs - weight gain w/ gyn surg    Special Diet Not Asked    Back Care Not Asked    Exercise Yes     Comment: 3-5 miles 4 days per week and enjoys dancing!  Bike Helmet Not Asked    Seat Belt Not Asked    Self-Exams Not Asked   Social History Narrative    . No children. Works full time for [employer],  since 2013. She [facility] independent [facility]. Does have HCP but can't remember name. Social Determinants of Health     Financial Resource Strain:     Difficulty of Paying Living Expenses: Not on file   Food Insecurity:     Worried About Running Out of Food in the Last Year: Not on file    Jerome of Food in the Last Year: Not on file   Transportation Needs:     Lack of Transportation (Medical): Not on file    Lack of Transportation (Non-Medical): Not on file   Physical Activity:     Days of Exercise per Week: Not on file    Minutes of Exercise per Session: Not on file   Stress:     Feeling of Stress : Not on file   Social Connections:     Frequency of Communication with Friends and Family: Not on file    Frequency of Social Gatherings with Friends and Family: Not on file    Attends Zoroastrianism Services: Not on file    Active Member of [address] [place] [address] [employer] or Organizations: Not on file    Attends Club or Organization Meetings: Not on file    Marital Status: Not on file   Intimate Partner Violence:     Fear of Current or Ex-Partner: Not on file    Emotionally Abused: Not on file    Physically Abused: Not on file    Sexually Abused: Not on file   Housing Stability:     Unable to Pay for Housing in the Last Year: Not on file    Number of [place] in the Last Year: Not on file    Unstable Housing in the Last Year: Not on file         ALLERGIES: Other medication and Codeine    Review of Systems   Constitutional: Negative for chills and fever. HENT: Negative for congestion.     Respiratory: Negative for cough and shortness of breath. Cardiovascular: Negative for chest pain. Gastrointestinal: Negative for abdominal pain, constipation, diarrhea, nausea and vomiting. Genitourinary: Negative for dysuria and frequency. Musculoskeletal: Positive for back pain. All other systems reviewed and are negative. Vitals:    02/24/22 1214 02/24/22 1414   BP: (!) 153/82 (!) 141/70   Pulse: 81 69   Resp: 16 16   Temp: 97.9 °F (36.6 °C) 97.5 °F (36.4 °C)   SpO2: 99% 97%            Physical Exam  Vitals and nursing note reviewed. Constitutional:       Appearance: Normal appearance. She is not diaphoretic. HENT:      Head: Atraumatic. Right Ear: External ear normal.      Left Ear: External ear normal.   Eyes:      Conjunctiva/sclera: Conjunctivae normal.   Cardiovascular:      Rate and Rhythm: Normal rate and regular rhythm. Heart sounds: Normal heart sounds. No murmur heard. No friction rub. No gallop. Pulmonary:      Effort: No respiratory distress. Breath sounds: Normal breath sounds. No stridor. No wheezing, rhonchi or rales. Abdominal:      General: Bowel sounds are normal. There is no distension. Palpations: Abdomen is soft. Tenderness: There is no abdominal tenderness. There is no guarding or rebound. Hernia: No hernia is present. Musculoskeletal:         General: No swelling. Arms:       Cervical back: Normal range of motion. No rigidity. Comments: ttp over region marked above, muscle spasm palpable. Skin:     General: Skin is warm and dry. Neurological:      Mental Status: She is alert and oriented to person, place, and time. Mental status is at baseline.           MDM  Number of Diagnoses or Management Options     Amount and/or Complexity of Data Reviewed  Clinical lab tests: ordered and reviewed  Tests in the radiology section of CPT®: ordered and reviewed  Tests in the medicine section of CPT®: ordered and reviewed  Discuss the patient with other providers: yes (Dr Maggie Mcknight, ED attending)           Procedures          2:07 PM  Pt reevaluated. Pt states her pain has resolved completely and she has no symptoms at this time. I have reviewed with them results as obtained thus far and opportunity for questions presented. Pt verbalizes their understanding. VITAL SIGNS:  Vitals:    02/24/22 1214 02/24/22 1414   BP: (!) 153/82 (!) 141/70   Pulse: 81 69   Resp: 16 16   Temp: 97.9 °F (36.6 °C) 97.5 °F (36.4 °C)   SpO2: 99% 97%         LABS:  Recent Results (from the past 24 hour(s))   URINALYSIS W/MICROSCOPIC    Collection Time: 02/24/22  1:05 PM   Result Value Ref Range    Color YELLOW/STRAW      Appearance CLEAR CLEAR      Specific gravity 1.010 1.003 - 1.030      pH (UA) 6.0 5.0 - 8.0      Protein Negative NEG mg/dL    Glucose Negative NEG mg/dL    Ketone Negative NEG mg/dL    Bilirubin Negative NEG      Blood Negative NEG      Urobilinogen 0.2 0.2 - 1.0 EU/dL    Nitrites Negative NEG      Leukocyte Esterase Negative NEG      WBC 0-4 0 - 4 /hpf    RBC 0-5 0 - 5 /hpf    Epithelial cells FEW FEW /lpf    Bacteria Negative NEG /hpf   URINE CULTURE HOLD SAMPLE    Collection Time: 02/24/22  1:05 PM    Specimen: Serum; Urine   Result Value Ref Range    Urine culture hold        Urine on hold in Microbiology dept for 2 days. If unpreserved urine is submitted, it cannot be used for addtional testing after 24 hours, recollection will be required.    CBC WITH AUTOMATED DIFF    Collection Time: 02/24/22  1:05 PM   Result Value Ref Range    WBC 5.0 3.6 - 11.0 K/uL    RBC 4.34 3.80 - 5.20 M/uL    HGB 13.3 11.5 - 16.0 g/dL    HCT 39.7 35.0 - 47.0 %    MCV 91.5 80.0 - 99.0 FL    MCH 30.6 26.0 - 34.0 PG    MCHC 33.5 30.0 - 36.5 g/dL    RDW 12.0 11.5 - 14.5 %    PLATELET 070 901 - 512 K/uL    MPV 10.2 8.9 - 12.9 FL    NRBC 0.0 0  WBC    ABSOLUTE NRBC 0.00 0.00 - 0.01 K/uL    NEUTROPHILS 57 32 - 75 %    LYMPHOCYTES 32 12 - 49 %    MONOCYTES 8 5 - 13 %    EOSINOPHILS 2 0 - 7 %    BASOPHILS 1 0 - 1 %    IMMATURE GRANULOCYTES 0 0.0 - 0.5 %    ABS. NEUTROPHILS 2.9 1.8 - 8.0 K/UL    ABS. LYMPHOCYTES 1.6 0.8 - 3.5 K/UL    ABS. MONOCYTES 0.4 0.0 - 1.0 K/UL    ABS. EOSINOPHILS 0.1 0.0 - 0.4 K/UL    ABS. BASOPHILS 0.0 0.0 - 0.1 K/UL    ABS. IMM. GRANS. 0.0 0.00 - 0.04 K/UL    DF AUTOMATED     METABOLIC PANEL, COMPREHENSIVE    Collection Time: 02/24/22  1:05 PM   Result Value Ref Range    Sodium 139 136 - 145 mmol/L    Potassium 4.2 3.5 - 5.1 mmol/L    Chloride 102 97 - 108 mmol/L    CO2 28 21 - 32 mmol/L    Anion gap 9 5 - 15 mmol/L    Glucose 105 (H) 65 - 100 mg/dL    BUN 15 6 - 20 MG/DL    Creatinine 0.83 0.55 - 1.02 MG/DL    BUN/Creatinine ratio 18 12 - 20      GFR est AA >60 >60 ml/min/1.73m2    GFR est non-AA >60 >60 ml/min/1.73m2    Calcium 9.3 8.5 - 10.1 MG/DL    Bilirubin, total 0.4 0.2 - 1.0 MG/DL    ALT (SGPT) 37 12 - 78 U/L    AST (SGOT) 25 15 - 37 U/L    Alk. phosphatase 79 45 - 117 U/L    Protein, total 7.4 6.4 - 8.2 g/dL    Albumin 4.8 3.5 - 5.0 g/dL    Globulin 2.6 2.0 - 4.0 g/dL    A-G Ratio 1.8 1.1 - 2.2     LIPASE    Collection Time: 02/24/22  1:05 PM   Result Value Ref Range    Lipase 123 73 - 393 U/L   TROPONIN-HIGH SENSITIVITY    Collection Time: 02/24/22  1:05 PM   Result Value Ref Range    Troponin-High Sensitivity 8 0 - 51 ng/L         IMAGING:  XR CHEST PA LAT   Final Result   No acute process         CT ABD PELV WO CONT   Final Result   5. Other incidental and postoperative changes. 1. No acute abdominal or pelvic abnormality is confirmed. 2. Severe diverticulosis without acute diverticulitis at this time. 3. No CT evidence for urolithiasis or urinary obstruction. 4. Geographic pattern of hepatic steatosis, thought to be stable since the prior   study.             Medications During Visit:  Medications   ketorolac (TORADOL) injection 15 mg (15 mg IntraVENous Given 2/24/22 1302)   diazePAM (VALIUM) tablet 5 mg (5 mg Oral Given 2/24/22 1302) DECISION MAKING:    Cindy Ojeda is a 66 y.o. female who comes in as above. Presents afebrile and hemodynamically stable. Presents with left sided back pain, tender to palpation suggestive of musculoskeletal etiology. Patient notes complete improvement of symptoms after medication. UA without evidence of infection indicative of pyelonephritis. CBC, CMP and lipase without acute etiology. EKG and troponin without indication of ACS. Chest x-ray without acute etiology and CT abdomen pelvis demonstrating no acute abdominal or pelvic abnormality. I have discussed care with ED attending. Pt has been given close return precautions as well as follow up recommendations. Opportunity for questions presented. Pt verbalizes their understanding and agreement with care plan. IMPRESSION:  1. Muscle spasm    2. Acute left-sided low back pain without sciatica        DISPOSITION:  Discharged      Discharge Medication List as of 2/24/2022  2:09 PM      START taking these medications    Details   methocarbamoL (Robaxin) 500 mg tablet Take 1 Tablet by mouth three (3) times daily for 2 days. , Normal, Disp-6 Tablet, R-0         CONTINUE these medications which have NOT CHANGED    Details   gemfibrozil (LOPID) 600 mg tablet TK 1 T PO D, Historical Med, R-1      diazePAM (VALIUM) 5 mg tablet TK 1 T PO BID PRN, Historical Med, R-5      butalbital-acetaminophen-caffeine (FIORICET, ESGIC) -40 mg per tablet TAKE 1 TABLET BY MOUTH DAILY AS NEEDED FOR PAIN/ HEADACHE, Print, Disp-30 Tab, R-0      aspirin delayed-release 81 mg tablet Take 1 Tab by mouth daily. , No Print, Disp-30 Tab, R-12      cholecalciferol, vitamin D3, (VITAMIN D3) 2,000 unit tab Take 2,000 Units by mouth daily. , Historical Med      vitamin E (AQUA GEMS) 400 unit capsule Take  by mouth daily. , Historical Med      ascorbic acid, vitamin C, (VITAMIN C) 500 mg tablet Take 500 mg by mouth daily. , Historical Med              Follow-up Information Follow up With Specialties Details Why Norbert Mathew MD Internal Medicine Call   [address] [place] [address],[address]  [phone]      [place] [address] Emergency Medicine Go to  As needed, If symptoms worsen Octavio Mayen [address] [facility] [address] [phone]            The patient is asked to follow-up with their primary care provider and any other physicians as above. We have discussed strict return precautions and the patient is asked to return promptly for any increased concerns or worsening of symptoms and for return precautions regarding their symptoms today. They can return to this emergency department or any other emergency department. I have discussed with them results as above and presented opportunity for questions. They verbalize their understanding of the above and agreement with care plan. Please note that this dictation was completed with [employer], the computer voice recognition software. Quite often unanticipated grammatical, syntax, homophones, and other interpretive errors are inadvertently transcribed by the computer software. Please disregard these errors. Please excuse any errors that have escaped final proofreading.

## 2022-02-24 NOTE — Clinical Note
[facility]  [address]  [address]  [phone]    Work/School Note    Date: 2/24/2022    To Whom It May concern:    Parth Palomares was seen and treated today in the emergency room by the following provider(s):  Attending Provider: William Phillips DO  Physician Assistant: Nicole Mccartney. Parth Palomares is excused from work/school on 2/24/2022 through 2/26/2022. She is medically clear to return to work/school on 2/27/2022.          Sincerely,          Danis Snow PA-C

## 2022-02-25 LAB
ATRIAL RATE: 70 BPM
CALCULATED P AXIS, ECG09: 68 DEGREES
CALCULATED R AXIS, ECG10: 85 DEGREES
CALCULATED T AXIS, ECG11: 59 DEGREES
DIAGNOSIS, 93000: NORMAL
P-R INTERVAL, ECG05: 232 MS
Q-T INTERVAL, ECG07: 402 MS
QRS DURATION, ECG06: 84 MS
QTC CALCULATION (BEZET), ECG08: 434 MS
VENTRICULAR RATE, ECG03: 70 BPM

## 2022-03-19 PROBLEM — R91.1 SOLITARY PULMONARY NODULE ON LUNG CT: Status: ACTIVE | Noted: 2017-06-08

## 2022-03-19 PROBLEM — K57.92 ACUTE DIVERTICULITIS OF INTESTINE: Status: ACTIVE | Noted: 2017-03-01

## 2022-03-19 PROBLEM — Z87.891 PERSONAL HISTORY OF NICOTINE DEPENDENCE: Status: ACTIVE | Noted: 2017-06-05

## 2022-03-19 PROBLEM — K76.0 FATTY LIVER DISEASE, NONALCOHOLIC: Status: ACTIVE | Noted: 2017-06-05

## 2022-03-19 PROBLEM — F17.200 SMOKING: Status: ACTIVE | Noted: 2017-06-05

## 2022-06-08 ENCOUNTER — HOSPITAL ENCOUNTER (EMERGENCY)
Age: 72
Discharge: HOME | End: 2022-06-08
Attending: EMERGENCY MEDICINE | Admitting: EMERGENCY MEDICINE
Payer: MEDICARE

## 2022-06-08 VITALS
SYSTOLIC BLOOD PRESSURE: 146 MMHG | HEART RATE: 85 BPM | DIASTOLIC BLOOD PRESSURE: 61 MMHG | TEMPERATURE: 98.2 F | RESPIRATION RATE: 18 BRPM | OXYGEN SATURATION: 97 %

## 2022-06-08 DIAGNOSIS — L03.116 CELLULITIS OF LEFT LOWER EXTREMITY: Primary | ICD-10-CM

## 2022-06-08 PROCEDURE — 99283 EMERGENCY DEPT VISIT LOW MDM: CPT

## 2022-06-08 RX ORDER — CEPHALEXIN 500 MG/1
500 CAPSULE ORAL 3 TIMES DAILY
Qty: 21 CAPSULE | Refills: 0 | Status: SHIPPED | OUTPATIENT
Start: 2022-06-08 | End: 2022-06-15

## 2022-06-08 NOTE — ED PROVIDER NOTES
Date of Service:  6/8/2022    Patient:  Ada Mccarty    Chief Complaint:  Skin Problem       HPI:  Ada Mccarty is a 66 y.o.  female who presents for evaluation of a rash to left lower extremity is 1 day. Patient states redness and  tenderness but denies itching, warmth, or discharge. Patient denies any new detergents, soaps, lotions or body wash. Patient denies any known insect bite to left lower extremity. Patient denies fever, bodies, or chills. Patient denies chest pain, shortness of breath, abdominal pain. Patient denies left calf tenderness or left lower extremity swelling. Patient denies history of blood clots or use of blood thinners. Patient declined ultrasound of left lower extremity.                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    Benign essential hypertension     Blood type O- 10/2014    with anti-M antibodies    Diverticulitis 8/3/2010    Follow up colonoscopy neg    Diverticulosis 03/10/2017    Fatty liver disease, nonalcoholic 9/9/4963    H/O colonoscopy 03/10/2017    History of esophageal ulcer (age 5)    has not recurred    History of ganglion cyst     left wrist - removed    History of shingles 2016    Hypercholesterolemia with hypertriglyceridemia 2005    controlled on medication    Hypovitaminosis D     Ill-defined condition     ESOPHAGEAL ULCER    Migraines     migraines    MVP (mitral valve prolapse)     Personal history of nicotine dependence  6/5/2017    Smoking 6/5/2017       Past Surgical History:   Procedure Laterality Date    COLONOSCOPY N/A 3/10/2017    COLONOSCOPY performed by Wallace Quintanilla MD at [address] [place] [address] ENDOSCOPY    HX APPENDECTOMY      Age 8    HX COLONOSCOPY  2010 2020 for next    HX GYN  1987    BTL    HX GYN  10/2014    Lap JUNO/BSO    HX ORTHOPAEDIC  1976    L wrist ganglion cyst    HX TONSILLECTOMY      MA ABDOMEN SURGERY PROC UNLISTED y-21    tubal ligation    NV CYSTOURETHROSCOPY  10/9/2014     performed by Betzaida Kang MD at [facility] <=250 GRAM  W TUBE/OVARY N/A 10/9/2014    ROBOTIC ASSISTED TOTAL LAPAROSCOPIC HYSTERECTOMY / BILATERAL SALPINGO OOPHORECTOMY / PARTIAL PARACERVICAL VAGINECTOMY, CYSTOSCOPY performed by Betzaida Kang MD at [facility] MAIN OR         Family History:   Problem Relation Age of Onset    Alzheimer's Disease Mother 79    OSTEOARTHRITIS Mother     Heart Disease Father 46    Diabetes Father     Heart Attack Father 46    Alzheimer's Disease Sister     High Cholesterol Sister     Hypertension Maternal Grandmother     Stroke Maternal Grandmother     Stroke Maternal Grandfather     Other Brother         brain disorder       Social History     Socioeconomic History    Marital status: SINGLE     Spouse name: Not on file    Number of children: Not on file    Years of education: Not on file    Highest education level: Not on file   Occupational History    Occupation: Administration specialists     Employer: [employer]   Tobacco Use    Smoking status: Current Every Day Smoker     Years: 40.00    Smokeless tobacco: Never Used    Tobacco comment: 3 cigarettes/ day current. 1/2 ppd for 40 years or so   Substance and Sexual Activity    Alcohol use:  Yes     Alcohol/week: 0.8 - 1.7 standard drinks     Types: 1 - 2 Cans of beer per week     Comment: only weekends/social    Drug use: No    Sexual activity: Never     Partners: Male     Birth control/protection: Condom   Other Topics Concern     Service Not Asked    Blood Transfusions Not Asked    Caffeine Concern Not Asked    Occupational Exposure Not Asked    Hobby Hazards Not Asked    Sleep Concern Not Asked    Stress Concern Not Asked    Weight Concern Yes     Comment: she feels better at 125 lbs - weight gain w/ gyn surg    Special Diet Not Asked    Back Care Not Asked    Exercise Yes     Comment: 3-5 miles 4 days per week and enjoys dancing!  Bike Helmet Not Asked    Seat Belt Not Asked    Self-Exams Not Asked   Social History Narrative    . No children. Works full time for [employer],  since 2013. She [facility] independent [facility] [place]. Does have HCP but can't remember name. Social Determinants of Health     Financial Resource Strain:     Difficulty of Paying Living Expenses: Not on file   Food Insecurity:     Worried About Running Out of Food in the Last Year: Not on file    Jerome of Food in the Last Year: Not on file   Transportation Needs:     Lack of Transportation (Medical): Not on file    Lack of Transportation (Non-Medical): Not on file   Physical Activity:     Days of Exercise per Week: Not on file    Minutes of Exercise per Session: Not on file   Stress:     Feeling of Stress : Not on file   Social Connections:     Frequency of Communication with Friends and Family: Not on file    Frequency of Social Gatherings with Friends and Family: Not on file    Attends Buddhist Services: Not on file    Active Member of [address] [place] [address] or Organizations: Not on file    Attends Club or Organization Meetings: Not on file    Marital Status: Not on file   Intimate Partner Violence:     Fear of Current or Ex-Partner: Not on file    Emotionally Abused: Not on file    Physically Abused: Not on file    Sexually Abused: Not on file   Housing Stability:     Unable to Pay for Housing in the Last Year: Not on file    Number of [place] in the Last Year: Not on file    Unstable Housing in the Last Year: Not on file         ALLERGIES: Other medication and Codeine    Review of Systems   Constitutional: Negative for chills and fever. Respiratory: Negative for shortness of breath. Cardiovascular: Negative for chest pain. Gastrointestinal: Negative for abdominal pain. Genitourinary: Negative for dysuria.    Musculoskeletal: Negative for joint swelling. Skin: Positive for rash. Allergic/Immunologic: Negative for immunocompromised state. Neurological: Negative for headaches. Psychiatric/Behavioral: Negative for agitation. All other systems reviewed and are negative. Vitals:    06/08/22 1453   BP: (!) 146/61   Pulse: 85   Resp: 18   Temp: 98.2 °F (36.8 °C)   SpO2: 97%            Physical Exam  Vitals and nursing note reviewed. Constitutional:       General: She is not in acute distress. Cardiovascular:      Rate and Rhythm: Normal rate and regular rhythm. Heart sounds: No murmur heard. Pulmonary:      Effort: Pulmonary effort is normal.      Breath sounds: Normal breath sounds. Abdominal:      Palpations: Abdomen is soft. Tenderness: There is no abdominal tenderness. Musculoskeletal:         General: Tenderness present. No swelling. Normal range of motion. Comments: Tenderness to medial left distal lower extremity. Skin:     General: Skin is warm. Findings: Erythema present. Comments: Cellulitis to distal medial left lower extremity. Tenderness noted. No warmth or active discharge   Neurological:      Mental Status: She is alert and oriented to person, place, and time. Mental status is at baseline. MDM       Procedures        VITAL SIGNS:  Patient Vitals for the past 4 hrs:   Temp Pulse Resp BP SpO2   06/08/22 1453 98.2 °F (36.8 °C) 85 18 (!) 146/61 97 %         LABS:  No results found for this or any previous visit (from the past 6 hour(s)). IMAGING:  No orders to display         Medications During Visit:  Medications - No data to display      DECISION MAKING:  Steve Varma is a 66 y.o. female who comes in as above. Explained to patient the risk of not obtaining an ultrasound of the left lower extremity. Patient still declined ultrasound of left lower extremity after discussion.   Patient requesting to try an antibiotic first.  Patient states that she will follow-up with her PCP or the emergency department if symptoms worsen. Patient prescribed a 7-day course of cephalexin. Patient stable upon discharge. Return precautions given to patient. IMPRESSION:  1. Cellulitis of left lower extremity        DISPOSITION:  Discharged      Discharge Medication List as of 6/8/2022  4:45 PM      START taking these medications    Details   cephALEXin (Keflex) 500 mg capsule Take 1 Capsule by mouth three (3) times daily for 7 days. , Print, Disp-21 Capsule, R-0         CONTINUE these medications which have NOT CHANGED    Details   gemfibrozil (LOPID) 600 mg tablet TK 1 T PO D, Historical Med, R-1      diazePAM (VALIUM) 5 mg tablet TK 1 T PO BID PRN, Historical Med, R-5      butalbital-acetaminophen-caffeine (FIORICET, ESGIC) -40 mg per tablet TAKE 1 TABLET BY MOUTH DAILY AS NEEDED FOR PAIN/ HEADACHE, Print, Disp-30 Tab, R-0      aspirin delayed-release 81 mg tablet Take 1 Tab by mouth daily. , No Print, Disp-30 Tab, R-12      cholecalciferol, vitamin D3, (VITAMIN D3) 2,000 unit tab Take 2,000 Units by mouth daily. , Historical Med      vitamin E (AQUA GEMS) 400 unit capsule Take  by mouth daily. , Historical Med      ascorbic acid, vitamin C, (VITAMIN C) 500 mg tablet Take 500 mg by mouth daily. , Historical Med              Follow-up Information     Follow up With Specialties Details Why Contact Duncan Reyes MD Internal Medicine Physician Schedule an appointment as soon as possible for a visit   [facility] [id number] [phone]      [facility] [address] [facility] Emergency Medicine  If symptoms worsen Octavio Mayen [address] [phone]            The patient is asked to follow-up with their primary care provider in the next several days. They are to call tomorrow for an appointment. The patient is asked to return promptly for any increased concerns or worsening of symptoms.   They can return to this emergency department or any other emergency department.

## 2022-06-08 NOTE — ED TRIAGE NOTES
Pt c/o red patches to lower left leg x1 day. PT c/o tenderness. She applied neosporin w/o relief. Pt presents to ED awake, alert, oriented, ambulatory.

## 2022-06-27 ENCOUNTER — APPOINTMENT (OUTPATIENT)
Dept: GENERAL RADIOLOGY | Age: 72
End: 2022-06-27
Attending: EMERGENCY MEDICINE
Payer: MEDICARE

## 2022-06-27 ENCOUNTER — HOSPITAL ENCOUNTER (EMERGENCY)
Age: 72
Discharge: HOME | End: 2022-06-27
Attending: EMERGENCY MEDICINE
Payer: MEDICARE

## 2022-06-27 ENCOUNTER — APPOINTMENT (OUTPATIENT)
Dept: ULTRASOUND IMAGING | Age: 72
End: 2022-06-27
Attending: EMERGENCY MEDICINE
Payer: MEDICARE

## 2022-06-27 VITALS
BODY MASS INDEX: 27.34 KG/M2 | RESPIRATION RATE: 16 BRPM | DIASTOLIC BLOOD PRESSURE: 75 MMHG | HEART RATE: 82 BPM | SYSTOLIC BLOOD PRESSURE: 120 MMHG | TEMPERATURE: 98.5 F | WEIGHT: 148.59 LBS | HEIGHT: 62 IN | OXYGEN SATURATION: 97 %

## 2022-06-27 DIAGNOSIS — M79.89 FOOT SWELLING: ICD-10-CM

## 2022-06-27 DIAGNOSIS — M79.672 LEFT FOOT PAIN: Primary | ICD-10-CM

## 2022-06-27 PROCEDURE — 99284 EMERGENCY DEPT VISIT MOD MDM: CPT

## 2022-06-27 PROCEDURE — 93971 EXTREMITY STUDY: CPT

## 2022-06-27 PROCEDURE — 73630 X-RAY EXAM OF FOOT: CPT

## 2022-06-27 NOTE — ED TRIAGE NOTES
Pt reports increased swelling and remaining pain noted to L foot post recent cellulitis diagnosis at the beginning of the month. Pt reports finishing full course of antibiotics.

## 2022-06-27 NOTE — ED PROVIDER NOTES
50-year-old female presents with a chief complaint of left foot pain and swelling. Patient was recently treated for cellulitis of the left foot. Her redness has not resolved. Yesterday she developed some pain in the foot which is worse with walking. She does not describe it or rated on a scale. She is currently pain-free. She is also noticed some minor swelling on the lateral aspect of the dorsum of the left foot. She denies shortness of breath or chest pain.            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    Benign essential hypertension     Blood type O- 10/2014    with anti-M antibodies    Diverticulitis 8/3/2010    Follow up colonoscopy neg    Diverticulosis 03/10/2017    Fatty liver disease, nonalcoholic 3/4/3347    H/O colonoscopy 03/10/2017    History of esophageal ulcer (age 5)    has not recurred    History of ganglion cyst     left wrist - removed    History of shingles 2016    Hypercholesterolemia with hypertriglyceridemia 2005    controlled on medication    Hypovitaminosis D     Ill-defined condition     ESOPHAGEAL ULCER    Migraines     migraines    MVP (mitral valve prolapse)     Personal history of nicotine dependence  6/5/2017    Smoking 6/5/2017       Past Surgical History:   Procedure Laterality Date    COLONOSCOPY N/A 3/10/2017    COLONOS[facility] by Osman Del [facility]o [facility] at [facility] ENDOSCOPY    HX APPENDECTOMY      Age 8    HX COLONOSCOPY  2010 2020 for next    HX GYN  1987    BTL    HX GYN  10/2014    Lap JUNO/BSO    HX ORTHOPAEDIC  1976    L wrist ganglion cyst    HX TONSILLECTOMY      AR ABDOMEN SURGERY PROC UNLISTED  y-20    tubal ligation    AR CYSTOURETHROSCOPY  10/9/2014     performed by Jadine Phoenix, MD at [address] [place] [address] <=250 GRAM  W TUBE/OVARY N/A 10/9/2014    ROBOTIC ASSISTED TOTAL LAPAROSCOPIC HYSTERECTOMY / BILATERAL SALPINGO OOPHORECTOMY / PARTIAL PARACERVICAL VAGINECTOMY, CYSTOSCOPY performed by Constantine Tucker MD at [facility] MAIN OR         Family History:   Problem Relation Age of Onset    Alzheimer's Disease Mother 79    OSTEOARTHRITIS Mother     Heart Disease Father 46    Diabetes Father     Heart Attack Father 46    Alzheimer's Disease Sister     High Cholesterol Sister     Hypertension Maternal Grandmother     Stroke Maternal Grandmother     Stroke Maternal Grandfather     Other Brother         brain disorder       Social History     Socioeconomic History    Marital status: SINGLE     Spouse name: Not on file    Number of children: Not on file    Years of education: Not on file    Highest education level: Not on file   Occupational History    Occupation: Administration specialists     Employer: [employer]   Tobacco Use    Smoking status: Current Every Day Smoker     Years: 40.00    Smokeless tobacco: Never Used    Tobacco comment: 3 cigarettes/ day current. 1/2 ppd for 40 years or so   Substance and Sexual Activity    Alcohol use: Yes     Alcohol/week: 0.8 - 1.7 standard drinks     Types: 1 - 2 Cans of beer per week     Comment: only weekends/social    Drug use: No    Sexual activity: Never     Partners: Male     Birth control/protection: Condom   Other Topics Concern     Service Not Asked    Blood Transfusions Not Asked    Caffeine Concern Not Asked    Occupational Exposure Not Asked    Hobby Hazards Not Asked    Sleep Concern Not Asked    Stress Concern Not Asked    Weight Concern Yes     Comment: she feels better at 125 lbs - weight gain w/ gyn surg    Special Diet Not Asked    Back Care Not Asked    Exercise Yes     Comment: 3-5 miles 4 days per week and enjoys dancing!  Bike Helmet Not Asked    Seat Belt Not Asked    Self-Exams Not Asked   Social History Narrative    . No children. Works full time for [employer],  since 2013.   She lives independent at a condo in [place]. Does have HCP but can't remember name. Social Determinants of Health     Financial Resource Strain:     Difficulty of Paying Living Expenses: Not on file   Food Insecurity:     Worried About Running Out of Food in the Last Year: Not on file    Jerome of Food in the Last Year: Not on file   Transportation Needs:     Lack of Transportation (Medical): Not on file    Lack of Transportation (Non-Medical): Not on file   Physical Activity:     Days of Exercise per Week: Not on file    Minutes of Exercise per Session: Not on file   Stress:     Feeling of Stress : Not on file   Social Connections:     Frequency of Communication with Friends and Family: Not on file    Frequency of Social Gatherings with Friends and Family: Not on file    Attends Baptism Services: Not on file    Active Member of [address] [place] [address] [employer] or Organizations: Not on file    Attends Club or Organization Meetings: Not on file    Marital Status: Not on file   Intimate Partner Violence:     Fear of Current or Ex-Partner: Not on file    Emotionally Abused: Not on file    Physically Abused: Not on file    Sexually Abused: Not on file   Housing Stability:     Unable to Pay for Housing in the Last Year: Not on file    Number of [place] in the Last Year: Not on file    Unstable Housing in the Last Year: Not on file         ALLERGIES: Other medication and Codeine    Review of Systems   Constitutional: Negative for fever. HENT: Negative for rhinorrhea. Respiratory: Negative for shortness of breath. Cardiovascular: Negative for chest pain. Gastrointestinal: Negative for abdominal pain. Genitourinary: Negative for dysuria. Musculoskeletal: Positive for arthralgias. Skin: Negative for wound. Neurological: Negative for headaches. Psychiatric/Behavioral: Negative for confusion.        Vitals:    06/27/22 0833   BP: (!) 118/105   Pulse: 82   Resp: 16   Temp: 98.5 °F (36.9 °C)   SpO2: 98%   Weight: 67.4 kg (148 lb 9.4 oz)   Height: 5' 2\" (1.575 m)            Physical Exam  Vitals and nursing note reviewed. Constitutional:       General: She is not in acute distress. Appearance: Normal appearance. She is not ill-appearing, toxic-appearing or diaphoretic. HENT:      Head: Normocephalic and atraumatic. Eyes:      Extraocular Movements: Extraocular movements intact. Cardiovascular:      Rate and Rhythm: Normal rate. Pulses: Normal pulses. Pulmonary:      Effort: Pulmonary effort is normal. No respiratory distress. Abdominal:      General: There is no distension. Musculoskeletal:         General: Normal range of motion. Cervical back: Normal range of motion. Comments: No erythema of the left lower extremity. Minor swelling over the dorsal lateral aspect of the left foot. Strong left pedal pulse. Skin:     General: Skin is dry. Neurological:      Mental Status: She is alert and oriented to person, place, and time. Psychiatric:         Mood and Affect: Mood normal.          MDM  Number of Diagnoses or Management Options  Foot swelling  Left foot pain  Diagnosis management comments:     71-year-old female presents with left foot swelling and pain. There is no evidence of infection. Duplex obtained to rule out DVT. X-ray shows no fracture. Discussed my clinical impression(s), any labs and/or radiology results with the patient. I answered any questions and addressed any concerns. Discussed the importance of following up with their primary care physician and/or specialist(s). Discussed signs or symptoms that would warrant return back to the ER for further evaluation. The patient is agreeable with discharge.          Procedures

## 2022-06-27 NOTE — ED NOTES
Pt discharged in stable condition at this time. MD/ANGELO reviewed discharge instructions and follow up with patient at bedside. Pt verbalized understanding and denies any needs or questions at this time.

## 2022-07-15 ENCOUNTER — HOSPITAL ENCOUNTER (OUTPATIENT)
Dept: INFUSION THERAPY | Age: 72
Discharge: HOME | End: 2022-07-15
Payer: MEDICARE

## 2022-07-15 VITALS
OXYGEN SATURATION: 98 % | HEART RATE: 71 BPM | RESPIRATION RATE: 18 BRPM | TEMPERATURE: 97.8 F | DIASTOLIC BLOOD PRESSURE: 78 MMHG | SYSTOLIC BLOOD PRESSURE: 148 MMHG

## 2022-07-15 PROCEDURE — 96366 THER/PROPH/DIAG IV INF ADDON: CPT

## 2022-07-15 PROCEDURE — 96365 THER/PROPH/DIAG IV INF INIT: CPT

## 2022-07-15 PROCEDURE — 74011000258 HC RX REV CODE- 258: Performed by: PODIATRIST

## 2022-07-15 PROCEDURE — 74011250636 HC RX REV CODE- 250/636: Performed by: PODIATRIST

## 2022-07-15 RX ORDER — ROSUVASTATIN CALCIUM 5 MG/1
5 TABLET, COATED ORAL
COMMUNITY

## 2022-07-15 RX ORDER — AMLODIPINE BESYLATE 5 MG/1
5 TABLET ORAL DAILY
COMMUNITY

## 2022-07-15 RX ORDER — DEXTROSE MONOHYDRATE 50 MG/ML
25 INJECTION, SOLUTION INTRAVENOUS ONCE
Status: COMPLETED | OUTPATIENT
Start: 2022-07-15 | End: 2022-07-15

## 2022-07-15 RX ADMIN — ORITAVANCIN 1200 MG: 400 INJECTION, POWDER, LYOPHILIZED, FOR SOLUTION INTRAVENOUS at 14:35

## 2022-07-15 RX ADMIN — DEXTROSE MONOHYDRATE 25 ML/HR: 5 INJECTION, SOLUTION INTRAVENOUS at 13:32

## 2022-07-15 NOTE — PROGRESS NOTES
Outpatient Infusion [facility] Progress Note    0608 Pt admit to [facility] for Oritavancin Abx ambulatory in stable condition. Assessment completed. No new concerns voiced. PIV to left arm with good blood return; D5 started at [facility]. Visit Vitals  BP (!) 148/78   Pulse 71   Temp 97.8 °F (36.6 °C)   Resp 18   LMP 04/06/2005   SpO2 98%   Breastfeeding No       Medications:  Medications Administered     dextrose 5% infusion     Admin Date  07/15/2022 Action  New Bag Dose  25 mL/hr Rate  25 mL/hr Route  IntraVENous Administered By  Jose Luis Jones, ARIELLE          oritavancin (ORBACTIV) 1,200 mg in dextrose 5% 1,000 mL, overfill volume 100 mL infusion     Admin Date  07/15/2022 Action  Given Dose  1,200 mg Rate  366.7 mL/hr Route  IntraVENous Administered By  Jose Luis Jones, RN                  0995 Pt tolerated treatment well. PIV removed per protocol. D/c home ambulatory in no distress.

## 2022-07-24 ENCOUNTER — HOSPITAL ENCOUNTER (EMERGENCY)
Age: 72
Discharge: HOME | End: 2022-07-24
Attending: EMERGENCY MEDICINE
Payer: MEDICARE

## 2022-07-24 VITALS
BODY MASS INDEX: 26.91 KG/M2 | OXYGEN SATURATION: 96 % | DIASTOLIC BLOOD PRESSURE: 98 MMHG | HEIGHT: 63 IN | WEIGHT: 151.9 LBS | SYSTOLIC BLOOD PRESSURE: 153 MMHG | HEART RATE: 97 BPM | RESPIRATION RATE: 16 BRPM | TEMPERATURE: 97.9 F

## 2022-07-24 DIAGNOSIS — L03.119 CELLULITIS OF LOWER EXTREMITY, UNSPECIFIED LATERALITY: Primary | ICD-10-CM

## 2022-07-24 PROCEDURE — 99283 EMERGENCY DEPT VISIT LOW MDM: CPT

## 2022-07-24 RX ORDER — DOXYCYCLINE HYCLATE 100 MG
100 TABLET ORAL 2 TIMES DAILY
Qty: 14 TABLET | Refills: 0 | Status: SHIPPED | OUTPATIENT
Start: 2022-07-24 | End: 2022-07-31

## 2022-07-24 NOTE — ED TRIAGE NOTES
Patient arrives to the ED with chief complaint of redness to her right ankle. June 8th, patient developed redness the left ankle and was treated with cellulitis. Now there is a patch of redness to the right ankle. Denies itching and swelling to the right ankle. Received oritavancin on 7/15/22.      Saw podiatrist Dr. Marvel Dancer

## 2022-07-24 NOTE — DISCHARGE INSTRUCTIONS
Thank you for allowing us to provide you with medical care today. We realize that you have many choices for your emergency care needs. We thank you for choosing [facility]. Please choose us in the future for any continued health care needs. The exam and treatment you received in the Emergency Department were for an emergent problem and are not intended as complete care. It is important that you follow up with a doctor, nurse practitioner, or physician's assistant for ongoing care. If your symptoms worsen or you do not improve as expected and you are unable to reach your usual health care provider, you should return to the Emergency Department. We are available 24 hours a day. Please make an appointment with your health care provider(s) for follow up of your Emergency Department visit. Take this sheet with you when you go to your follow-up visit.

## 2022-07-24 NOTE — ED PROVIDER NOTES
21-year-old female with history of lower extremity cellulitis presents with lower extremity erythema. She noticed the redness this morning. She denies any trauma. She has undergone antibiotic therapy recently and follows with podiatry. She denies fevers or other symptoms.        Past Medical History:   Diagnosis Date    Adverse effect of anesthesia     Pt has a rare blood type - O neg with anti-M antibodies    Benign essential hypertension     Blood type O- 10/2014    with anti-M antibodies    Diverticulitis 8/3/2010    Follow up colonoscopy neg    Diverticulosis 03/10/2017    Fatty liver disease, nonalcoholic 8/3/1054    H/O colonoscopy 03/10/2017    History of esophageal ulcer (age 5)    has not recurred    History of ganglion cyst     left wrist - removed    History of shingles 2016    Hypercholesterolemia with hypertriglyceridemia 2005    controlled on medication    Hypovitaminosis D     Ill-defined condition     ESOPHAGEAL ULCER    Migraines     migraines    MVP (mitral valve prolapse)     Personal history of nicotine dependence  6/5/2017    Smoking 6/5/2017       Past Surgical History:   Procedure Laterality Date    COLONOSCOPY N/A 3/10/2017    COLONOSCOPY performed by Samuel Pinon MD at [facility] ENDOSCOPY    HX APPENDECTOMY      Age 6    HX COLONOSCOPY  2010 2020 for next    [address]    HX GYN  10/2014    Lap JUNO/BSO    HX ORTHOPAEDIC  1976    L wrist ganglion cyst    HX TONSILLECTOMY      DE ABDOMEN SURGERY PROC UNLISTED  y-20    tubal ligation    DE CYSTOURETHROSCOPY  10/9/2014     performed by Baldemar Chao MD at [facility] <=250 GRAM  W TUBE/OVARY N/A 10/9/2014    ROBOTIC ASSISTED TOTAL LAPAROSCOPIC HYSTERECTOMY / BILATERAL SALPINGO OOPHORECTOMY / PARTIAL PARACERVICAL VAGINECTOMY, CYST[facility]COPY [facility]         Family History:   Problem Relation Age of Onset    Alzheimer's Disease Mother 79 OSTEOARTHRITIS Mother     Heart Disease Father 46    Diabetes Father     Heart Attack Father 46    Alzheimer's Disease Sister     High Cholesterol Sister     Hypertension Maternal Grandmother     Stroke Maternal Grandmother     Stroke Maternal Grandfather     Other Brother         brain disorder       Social History     Socioeconomic History    Marital status: SINGLE     Spouse name: Not on file    Number of children: Not on file    Years of education: Not on file    Highest education level: Not on file   Occupational History    Occupation: Administration specialists     Employer: [employer]   Tobacco Use    Smoking status: Every Day     Years: 40.00     Types: Cigarettes    Smokeless tobacco: Never    Tobacco comments:     3 cigarettes/ day current. 1/2 ppd for 40 years or so   Substance and Sexual Activity    Alcohol use: Yes     Alcohol/week: 0.8 - 1.7 standard drinks     Types: 1 - 2 Cans of beer per week     Comment: only weekends/social    Drug use: No    Sexual activity: Never     Partners: Male     Birth control/protection: Condom   Other Topics Concern     Service Not Asked    Blood Transfusions Not Asked    Caffeine Concern Not Asked    Occupational Exposure Not Asked    Hobby Hazards Not Asked    Sleep Concern Not Asked    Stress Concern Not Asked    Weight Concern Yes     Comment: she feels better at 125 lbs - weight gain w/ gyn surg    Special Diet Not Asked    Back Care Not Asked    Exercise Yes     Comment: 3-5 miles 4 days per week and enjoys dancing! Bike Helmet Not Asked    Seat Belt Not Asked    Self-Exams Not Asked   Social History Narrative    . No children. Works full time for [employer],  since 2013. She lives independent at a [facility] in [place]. Does have HCP but can't remember name.          Social Determinants of Health     Financial Resource Strain: Not on file   Food Insecurity: Not on file   Transportation Needs: Not on file   Physical Activity: Not on file   Stress: Not on file   Social Connections: Not on file   Intimate Partner Violence: Not on file   Housing Stability: Not on file         ALLERGIES: Other medication and Codeine    Review of Systems   Constitutional:  Negative for fever. HENT:  Negative for rhinorrhea. Respiratory:  Negative for shortness of breath. Cardiovascular:  Negative for chest pain. Gastrointestinal:  Negative for abdominal pain. Genitourinary:  Negative for dysuria. Musculoskeletal:  Negative for back pain. Skin:  Positive for color change. Neurological:  Negative for headaches. Psychiatric/Behavioral:  Negative for confusion. Vitals:    07/24/22 1027   BP: (!) 153/98   Pulse: 97   Resp: 16   Temp: 97.9 °F (36.6 °C)   SpO2: 96%   Weight: 68.9 kg (151 lb 14.4 oz)   Height: 5' 3\" (1.6 m)            Physical Exam  Vitals and nursing note reviewed. Constitutional:       General: She is not in acute distress. Appearance: Normal appearance. She is not ill-appearing, toxic-appearing or diaphoretic. HENT:      Head: Normocephalic and atraumatic. Eyes:      Extraocular Movements: Extraocular movements intact. Cardiovascular:      Rate and Rhythm: Normal rate. Pulses: Normal pulses. Pulmonary:      Effort: Pulmonary effort is normal. No respiratory distress. Abdominal:      General: There is no distension. Musculoskeletal:         General: Normal range of motion. Cervical back: Normal range of motion. Skin:     General: Skin is dry. Comments: Mild erythema of the bilateral lower extremities. See pictures below. Neurological:      Mental Status: She is alert and oriented to person, place, and time. Psychiatric:         Mood and Affect: Mood normal.                MDM  Number of Diagnoses or Management Options  Cellulitis of lower extremity, unspecified laterality  Diagnosis management comments: Patient's presentation could be related to cellulitis versus vasculitis. We will treat with doxycycline and recommend close podiatry follow-up. Discussed my clinical impression(s), any labs and/or radiology results with the patient. I answered any questions and addressed any concerns. Discussed the importance of following up with their primary care physician and/or specialist(s). Discussed signs or symptoms that would warrant return back to the ER for further evaluation. The patient is agreeable with discharge.            Procedures

## 2022-07-27 ENCOUNTER — TRANSCRIBE ORDER (OUTPATIENT)
Dept: SCHEDULING | Age: 72
End: 2022-07-27

## 2022-07-27 ENCOUNTER — HOSPITAL ENCOUNTER (OUTPATIENT)
Dept: MRI IMAGING | Age: 72
Discharge: HOME | End: 2022-07-27
Attending: PODIATRIST
Payer: MEDICARE

## 2022-07-27 DIAGNOSIS — L03.119 CELLULITIS OF FOOT: Primary | ICD-10-CM

## 2022-07-27 DIAGNOSIS — L03.116 CELLULITIS OF LEFT FOOT: Primary | ICD-10-CM

## 2022-07-27 DIAGNOSIS — L03.119 CELLULITIS OF FOOT: ICD-10-CM

## 2022-07-27 PROCEDURE — 74011250636 HC RX REV CODE- 250/636

## 2022-07-27 PROCEDURE — 73723 MRI JOINT LWR EXTR W/O&W/DYE: CPT

## 2022-07-27 PROCEDURE — A9576 INJ PROHANCE MULTIPACK: HCPCS

## 2022-07-27 RX ADMIN — GADOTERIDOL 15 ML: 279.3 INJECTION, SOLUTION INTRAVENOUS at 15:27

## 2023-01-02 ENCOUNTER — TRANSCRIBE ORDER (OUTPATIENT)
Dept: SCHEDULING | Age: 73
End: 2023-01-02

## 2023-01-02 DIAGNOSIS — Z12.31 SCREENING MAMMOGRAM FOR HIGH-RISK PATIENT: Primary | ICD-10-CM

## 2023-01-19 ENCOUNTER — HOSPITAL ENCOUNTER (OUTPATIENT)
Dept: MAMMOGRAPHY | Age: 73
Discharge: HOME | End: 2023-01-19
Attending: INTERNAL MEDICINE
Payer: MEDICARE

## 2023-01-19 DIAGNOSIS — Z12.31 SCREENING MAMMOGRAM FOR HIGH-RISK PATIENT: ICD-10-CM

## 2023-01-19 PROCEDURE — 77067 SCR MAMMO BI INCL CAD: CPT

## 2023-07-24 ENCOUNTER — HOSPITAL ENCOUNTER (OUTPATIENT)
Facility: HOSPITAL | Age: 73
Discharge: HOME | End: 2023-07-27
Attending: PODIATRIST
Payer: MEDICARE

## 2023-07-24 DIAGNOSIS — S86.312A TEAR OF PERONEAL TENDON, LEFT, INITIAL ENCOUNTER: ICD-10-CM

## 2023-07-24 PROCEDURE — 6360000004 HC RX CONTRAST MEDICATION

## 2023-07-24 PROCEDURE — A9579 GAD-BASE MR CONTRAST NOS,1ML: HCPCS

## 2023-07-24 PROCEDURE — 73720 MRI LWR EXTREMITY W/O&W/DYE: CPT

## 2023-07-24 RX ADMIN — GADOTERIDOL 12 ML: 279.3 INJECTION, SOLUTION INTRAVENOUS at 17:23

## 2023-09-05 NOTE — PERIOP NOTE
Scheduled patient for PAT/H&P  - day prior to surgery for H&P availability. Called back to LM for hilda to contact prescriber of 81mg ASA for instructions to hold prior to surgery. If not prescribed by MD, instructed to hold 7 days. LM with Dr. Juan Meneses office to notify that we could only see patient on 9/12 if hwe are to do H&P here.

## 2023-09-11 ENCOUNTER — ANESTHESIA EVENT (OUTPATIENT)
Facility: HOSPITAL | Age: 73
End: 2023-09-11
Payer: MEDICARE

## 2023-09-12 ENCOUNTER — HOSPITAL ENCOUNTER (OUTPATIENT)
Facility: HOSPITAL | Age: 73
Discharge: HOME | End: 2023-09-15
Payer: MEDICARE

## 2023-09-12 VITALS
HEIGHT: 62 IN | OXYGEN SATURATION: 97 % | BODY MASS INDEX: 26.87 KG/M2 | SYSTOLIC BLOOD PRESSURE: 136 MMHG | RESPIRATION RATE: 18 BRPM | DIASTOLIC BLOOD PRESSURE: 71 MMHG | HEART RATE: 77 BPM | WEIGHT: 146 LBS | TEMPERATURE: 97.1 F

## 2023-09-12 LAB
ANION GAP SERPL CALC-SCNC: 2 MMOL/L (ref 5–15)
BUN SERPL-MCNC: 13 MG/DL (ref 6–20)
BUN/CREAT SERPL: 18 (ref 12–20)
CALCIUM SERPL-MCNC: 9.4 MG/DL (ref 8.5–10.1)
CHLORIDE SERPL-SCNC: 109 MMOL/L (ref 97–108)
CO2 SERPL-SCNC: 30 MMOL/L (ref 21–32)
CREAT SERPL-MCNC: 0.72 MG/DL (ref 0.55–1.02)
EKG ATRIAL RATE: 71 BPM
EKG DIAGNOSIS: NORMAL
EKG P AXIS: 70 DEGREES
EKG P-R INTERVAL: 234 MS
EKG Q-T INTERVAL: 402 MS
EKG QRS DURATION: 86 MS
EKG QTC CALCULATION (BAZETT): 436 MS
EKG R AXIS: 44 DEGREES
EKG T AXIS: 39 DEGREES
EKG VENTRICULAR RATE: 71 BPM
GLUCOSE SERPL-MCNC: 104 MG/DL (ref 65–100)
POTASSIUM SERPL-SCNC: 4.4 MMOL/L (ref 3.5–5.1)
SODIUM SERPL-SCNC: 141 MMOL/L (ref 136–145)

## 2023-09-12 PROCEDURE — 36415 COLL VENOUS BLD VENIPUNCTURE: CPT

## 2023-09-12 PROCEDURE — 80048 BASIC METABOLIC PNL TOTAL CA: CPT

## 2023-09-12 PROCEDURE — 93005 ELECTROCARDIOGRAM TRACING: CPT | Performed by: NURSE PRACTITIONER

## 2023-09-12 ASSESSMENT — ENCOUNTER SYMPTOMS
SORE THROAT: 0
ABDOMINAL PAIN: 0
VOMITING: 0
BLOOD IN STOOL: 0
TROUBLE SWALLOWING: 0
NAUSEA: 0
SHORTNESS OF BREATH: 0
WHEEZING: 0
COUGH: 0

## 2023-09-12 NOTE — H&P
Xena Balbir was referred for evaluation by:Dr. Cesilia Cunningham for Pre- Op Evaluation. Please see encounter details and orders for consultative summary. Type of surgery : Left foot Repair of Peroneal Tendons  Surgery site : Left foot  Anesthesia type: IV sedation with regional block  Date of procedure:  9/13/2023    This 75y.o. year old female presents with complaints of left pain for the past year. Has pain with ambulation and has been wearing compression socks for stability. Patient has discussed the risks, alternatives, and benefits of the surgery with surgeon and has elected to proceed with surgical intervention. Allergies: Allergies   Allergen Reactions    Codeine Nausea Only     hives     Latex allergy: no  Prior reactions to anesthesia:  None     Current Outpatient Medications   Medication Sig    Rosuvastatin Calcium 20 MG CPSP Take by mouth nightly    amLODIPine (NORVASC) 5 MG tablet Take 1 tablet by mouth nightly    aspirin 81 MG EC tablet Take 1 tablet by mouth daily    butalbital-acetaminophen-caffeine (FIORICET, ESGIC) -40 MG per tablet TAKE 1 TABLET BY MOUTH DAILY AS NEEDED FOR PAIN/ HEADACHE    diazePAM (VALIUM) 5 MG tablet TK 1 T PO BID PRN     No current facility-administered medications for this encounter.      Past Medical History:   Diagnosis Date    Adverse effect of anesthesia     Pt has a rare blood type - O neg with anti-M antibodies    Benign essential hypertension     Blood type O- 10/2014    with anti-M antibodies    Diverticulitis 8/3/2010    Follow up colonoscopy neg    Diverticulosis 03/10/2017    Fatty liver disease, nonalcoholic 5/5/6956    H/O colonoscopy 03/10/2017    History of esophageal ulcer (age 5)    has not recurred    History of ganglion cyst     left wrist - removed    History of shingles 2016    Hypercholesterolemia with hypertriglyceridemia 2005    controlled on medication    Hypovitaminosis D     Ill-defined condition     ESOPHAGEAL ULCER    Migraines

## 2023-09-13 ENCOUNTER — HOSPITAL ENCOUNTER (OUTPATIENT)
Facility: HOSPITAL | Age: 73
Setting detail: OUTPATIENT SURGERY
Discharge: HOME | End: 2023-09-13
Attending: PODIATRIST | Admitting: PODIATRIST
Payer: MEDICARE

## 2023-09-13 ENCOUNTER — ANESTHESIA (OUTPATIENT)
Facility: HOSPITAL | Age: 73
End: 2023-09-13
Payer: MEDICARE

## 2023-09-13 VITALS
TEMPERATURE: 97.8 F | RESPIRATION RATE: 21 BRPM | BODY MASS INDEX: 26.84 KG/M2 | WEIGHT: 144.4 LBS | HEART RATE: 79 BPM | DIASTOLIC BLOOD PRESSURE: 80 MMHG | OXYGEN SATURATION: 98 % | SYSTOLIC BLOOD PRESSURE: 159 MMHG

## 2023-09-13 DIAGNOSIS — G89.18 POST-OP PAIN: Primary | ICD-10-CM

## 2023-09-13 PROCEDURE — 3600000012 HC SURGERY LEVEL 2 ADDTL 15MIN: Performed by: PODIATRIST

## 2023-09-13 PROCEDURE — 7100000011 HC PHASE II RECOVERY - ADDTL 15 MIN: Performed by: PODIATRIST

## 2023-09-13 PROCEDURE — 2500000003 HC RX 250 WO HCPCS: Performed by: NURSE ANESTHETIST, CERTIFIED REGISTERED

## 2023-09-13 PROCEDURE — 3600000002 HC SURGERY LEVEL 2 BASE: Performed by: PODIATRIST

## 2023-09-13 PROCEDURE — 7100000010 HC PHASE II RECOVERY - FIRST 15 MIN: Performed by: PODIATRIST

## 2023-09-13 PROCEDURE — 6360000002 HC RX W HCPCS: Performed by: NURSE ANESTHETIST, CERTIFIED REGISTERED

## 2023-09-13 PROCEDURE — 3700000000 HC ANESTHESIA ATTENDED CARE: Performed by: PODIATRIST

## 2023-09-13 PROCEDURE — 7100000000 HC PACU RECOVERY - FIRST 15 MIN: Performed by: PODIATRIST

## 2023-09-13 PROCEDURE — 64445 NJX AA&/STRD SCIATIC NRV IMG: CPT | Performed by: ANESTHESIOLOGY

## 2023-09-13 PROCEDURE — 3700000001 HC ADD 15 MINUTES (ANESTHESIA): Performed by: PODIATRIST

## 2023-09-13 PROCEDURE — 2580000003 HC RX 258: Performed by: NURSE ANESTHETIST, CERTIFIED REGISTERED

## 2023-09-13 PROCEDURE — 7100000001 HC PACU RECOVERY - ADDTL 15 MIN: Performed by: PODIATRIST

## 2023-09-13 PROCEDURE — 2709999900 HC NON-CHARGEABLE SUPPLY: Performed by: PODIATRIST

## 2023-09-13 PROCEDURE — 6360000002 HC RX W HCPCS: Performed by: ANESTHESIOLOGY

## 2023-09-13 RX ORDER — SODIUM CHLORIDE, SODIUM LACTATE, POTASSIUM CHLORIDE, CALCIUM CHLORIDE 600; 310; 30; 20 MG/100ML; MG/100ML; MG/100ML; MG/100ML
INJECTION, SOLUTION INTRAVENOUS CONTINUOUS PRN
Status: DISCONTINUED | OUTPATIENT
Start: 2023-09-13 | End: 2023-09-13 | Stop reason: SDUPTHER

## 2023-09-13 RX ORDER — EPHEDRINE SULFATE/0.9% NACL/PF 50 MG/5 ML
SYRINGE (ML) INTRAVENOUS PRN
Status: DISCONTINUED | OUTPATIENT
Start: 2023-09-13 | End: 2023-09-13 | Stop reason: SDUPTHER

## 2023-09-13 RX ORDER — ASPIRIN 325 MG
325 TABLET ORAL 2 TIMES DAILY
Qty: 60 TABLET | Refills: 0 | Status: SHIPPED | OUTPATIENT
Start: 2023-09-13 | End: 2023-10-13

## 2023-09-13 RX ORDER — OXYCODONE HYDROCHLORIDE AND ACETAMINOPHEN 5; 325 MG/1; MG/1
1 TABLET ORAL EVERY 6 HOURS PRN
Qty: 28 TABLET | Refills: 0 | Status: SHIPPED | OUTPATIENT
Start: 2023-09-13 | End: 2023-09-20

## 2023-09-13 RX ORDER — FENTANYL CITRATE 50 UG/ML
INJECTION, SOLUTION INTRAMUSCULAR; INTRAVENOUS PRN
Status: DISCONTINUED | OUTPATIENT
Start: 2023-09-13 | End: 2023-09-13 | Stop reason: SDUPTHER

## 2023-09-13 RX ORDER — SODIUM CHLORIDE, SODIUM LACTATE, POTASSIUM CHLORIDE, CALCIUM CHLORIDE 600; 310; 30; 20 MG/100ML; MG/100ML; MG/100ML; MG/100ML
INJECTION, SOLUTION INTRAVENOUS CONTINUOUS
Status: DISCONTINUED | OUTPATIENT
Start: 2023-09-13 | End: 2023-09-13 | Stop reason: HOSPADM

## 2023-09-13 RX ORDER — LIDOCAINE HYDROCHLORIDE 10 MG/ML
1 INJECTION, SOLUTION EPIDURAL; INFILTRATION; INTRACAUDAL; PERINEURAL
Status: DISCONTINUED | OUTPATIENT
Start: 2023-09-13 | End: 2023-09-13 | Stop reason: HOSPADM

## 2023-09-13 RX ORDER — FENTANYL CITRATE 50 UG/ML
100 INJECTION, SOLUTION INTRAMUSCULAR; INTRAVENOUS
Status: DISCONTINUED | OUTPATIENT
Start: 2023-09-13 | End: 2023-09-13 | Stop reason: HOSPADM

## 2023-09-13 RX ORDER — ROPIVACAINE HYDROCHLORIDE 5 MG/ML
INJECTION, SOLUTION EPIDURAL; INFILTRATION; PERINEURAL PRN
Status: DISCONTINUED | OUTPATIENT
Start: 2023-09-13 | End: 2023-09-13 | Stop reason: SDUPTHER

## 2023-09-13 RX ORDER — GLYCOPYRROLATE 0.2 MG/ML
INJECTION INTRAMUSCULAR; INTRAVENOUS PRN
Status: DISCONTINUED | OUTPATIENT
Start: 2023-09-13 | End: 2023-09-13 | Stop reason: SDUPTHER

## 2023-09-13 RX ORDER — PROPOFOL 10 MG/ML
INJECTION, EMULSION INTRAVENOUS CONTINUOUS PRN
Status: DISCONTINUED | OUTPATIENT
Start: 2023-09-13 | End: 2023-09-13 | Stop reason: SDUPTHER

## 2023-09-13 RX ORDER — CEFAZOLIN SODIUM 1 G/3ML
INJECTION, POWDER, FOR SOLUTION INTRAMUSCULAR; INTRAVENOUS PRN
Status: DISCONTINUED | OUTPATIENT
Start: 2023-09-13 | End: 2023-09-13 | Stop reason: SDUPTHER

## 2023-09-13 RX ORDER — ONDANSETRON 2 MG/ML
4 INJECTION INTRAMUSCULAR; INTRAVENOUS
Status: DISCONTINUED | OUTPATIENT
Start: 2023-09-13 | End: 2023-09-13 | Stop reason: HOSPADM

## 2023-09-13 RX ORDER — DIPHENHYDRAMINE HYDROCHLORIDE 50 MG/ML
12.5 INJECTION INTRAMUSCULAR; INTRAVENOUS
Status: DISCONTINUED | OUTPATIENT
Start: 2023-09-13 | End: 2023-09-13 | Stop reason: HOSPADM

## 2023-09-13 RX ORDER — MIDAZOLAM HYDROCHLORIDE 1 MG/ML
INJECTION INTRAMUSCULAR; INTRAVENOUS PRN
Status: DISCONTINUED | OUTPATIENT
Start: 2023-09-13 | End: 2023-09-13 | Stop reason: SDUPTHER

## 2023-09-13 RX ORDER — ONDANSETRON 2 MG/ML
INJECTION INTRAMUSCULAR; INTRAVENOUS PRN
Status: DISCONTINUED | OUTPATIENT
Start: 2023-09-13 | End: 2023-09-13 | Stop reason: SDUPTHER

## 2023-09-13 RX ORDER — MIDAZOLAM HYDROCHLORIDE 2 MG/2ML
2 INJECTION, SOLUTION INTRAMUSCULAR; INTRAVENOUS
Status: DISCONTINUED | OUTPATIENT
Start: 2023-09-13 | End: 2023-09-13 | Stop reason: HOSPADM

## 2023-09-13 RX ORDER — DEXMEDETOMIDINE HYDROCHLORIDE 100 UG/ML
INJECTION, SOLUTION INTRAVENOUS PRN
Status: DISCONTINUED | OUTPATIENT
Start: 2023-09-13 | End: 2023-09-13 | Stop reason: SDUPTHER

## 2023-09-13 RX ADMIN — FENTANYL CITRATE 50 MCG: 50 INJECTION, SOLUTION INTRAMUSCULAR; INTRAVENOUS at 08:33

## 2023-09-13 RX ADMIN — SODIUM CHLORIDE, POTASSIUM CHLORIDE, SODIUM LACTATE AND CALCIUM CHLORIDE: 600; 310; 30; 20 INJECTION, SOLUTION INTRAVENOUS at 09:29

## 2023-09-13 RX ADMIN — ROPIVACAINE HYDROCHLORIDE 10 ML: 5 INJECTION, SOLUTION EPIDURAL; INFILTRATION; PERINEURAL at 08:43

## 2023-09-13 RX ADMIN — Medication 10 MG: at 10:02

## 2023-09-13 RX ADMIN — ROPIVACAINE HYDROCHLORIDE 30 ML: 5 INJECTION, SOLUTION EPIDURAL; INFILTRATION; PERINEURAL at 08:39

## 2023-09-13 RX ADMIN — FENTANYL CITRATE 50 MCG: 50 INJECTION, SOLUTION INTRAMUSCULAR; INTRAVENOUS at 08:36

## 2023-09-13 RX ADMIN — CEFAZOLIN SODIUM 2 G: 1 POWDER, FOR SOLUTION INTRAMUSCULAR; INTRAVENOUS at 09:37

## 2023-09-13 RX ADMIN — DEXMEDETOMIDINE 4 MCG: 100 INJECTION, SOLUTION INTRAVENOUS at 09:29

## 2023-09-13 RX ADMIN — PROPOFOL 150 MCG/KG/MIN: 10 INJECTION, EMULSION INTRAVENOUS at 09:33

## 2023-09-13 RX ADMIN — PROPOFOL 20 MG: 10 INJECTION, EMULSION INTRAVENOUS at 09:35

## 2023-09-13 RX ADMIN — GLYCOPYRROLATE 0.1 MG: 0.2 INJECTION INTRAMUSCULAR; INTRAVENOUS at 09:49

## 2023-09-13 RX ADMIN — ONDANSETRON HYDROCHLORIDE 4 MG: 2 SOLUTION INTRAMUSCULAR; INTRAVENOUS at 09:30

## 2023-09-13 RX ADMIN — Medication 10 MG: at 09:50

## 2023-09-13 RX ADMIN — MIDAZOLAM HYDROCHLORIDE 2 MG: 1 INJECTION, SOLUTION INTRAMUSCULAR; INTRAVENOUS at 08:33

## 2023-09-13 ASSESSMENT — PAIN SCALES - GENERAL
PAINLEVEL_OUTOF10: 0

## 2023-09-13 ASSESSMENT — LIFESTYLE VARIABLES: SMOKING_STATUS: 1

## 2023-09-13 ASSESSMENT — PAIN - FUNCTIONAL ASSESSMENT: PAIN_FUNCTIONAL_ASSESSMENT: 0-10

## 2023-09-13 NOTE — OP NOTE
[address] [place] [address]  OPERATIVE REPORT    Name:  Yosvany Kenney  MR#:  [id number]  :  1950  ACCOUNT #:  [de-identified]  DATE OF SERVICE:  2023    PREOPERATIVE DIAGNOSIS:  Left peroneus brevis tendon tear. POSTOPERATIVE DIAGNOSIS:  Left peroneus brevis tendon tear. PROCEDURE PERFORMED:  Left peroneus brevis tendon repair. SURGEON:  Francisco Powers DPM.    ASSISTANT:  None. ANESTHESIA:  MAC with regional block. COMPLICATIONS:  None. SPECIMENS REMOVED:  None. IMPLANTS/MATERIALS:  2-0 Ethibond. ESTIMATED BLOOD LOSS:  Minimal.    HEMOSTASIS:  Ankle tourniquet at 250 mmHg. INDICATION FOR PROCEDURE:  This patient is a 50-year-old female patient of mine, presenting with left foot and ankle pain secondary to the above-mentioned diagnosis. The patient has exhausted all conservative measures at this time and has elected to undergo surgical intervention. All risks, benefits, indications, complications, and alternatives were discussed at length with the patient. All questions were answered to the patient's apparent satisfaction. Signed informed consent was placed in the chart. PROCEDURE IN DETAIL:  The patient was brought from the preoperative holding area to the operating room and placed on the operating room table in a secured supine position. The above-mentioned anesthesia was administered per the Anesthesia team.  A well-padded pneumatic ankle tourniquet was applied to the left lower extremity. The left lower extremity was then prepped and draped utilizing sterile aseptic technique and ChloraPrep scrub. A time-out was performed and all were in agreement. The left lower extremity was then elevated and the tourniquet was inflated to 250 mmHg. Attention was directed to the lateral aspect of the left ankle. Utilizing #15 blade, a curvilinear incision was made just posterior to the fibula extending just distal to the lateral malleolus.   The incision was carried

## 2023-09-13 NOTE — BRIEF OP NOTE
Brief Postoperative Note      Patient: Mary Jo Gutierrez  YOB: 1950  MRN: [id number]    Date of Procedure: 9/13/2023    Pre-Op Diagnosis Codes: * Spontaneous rupture of flexor tendons, left ankle and foot [M66.372]     * Left foot pain [M79.672]    Post-Op Diagnosis: Same       Procedure(s):  LEFT FOOT REPAIR OF PERONEAL TENDONS (IV CONSCIOUS SEDATION WITH REGIONAL BLOCK)    Surgeon(s): Michelle Schwarz DPM    Assistant:  * No surgical staff found *    Anesthesia: Monitor Anesthesia Care    Estimated Blood Loss (mL): Minimal    Complications: None    Specimens:   * No specimens in log *    Implants:  * No implants in log *      Drains: * No LDAs found *    Findings: Longitudinal tear of the peroneus brevis tendon. See operative report for details.       Electronically signed by Michelle Schwarz DPM on 9/13/2023 at 10:45 AM

## 2023-09-13 NOTE — INTERVAL H&P NOTE
Update History & Physical    The patient's History and Physical of September 12, 2023 was reviewed with the patient and I examined the patient. There was no change. The surgical site was confirmed by the patient and me. Plan: The risks, benefits, expected outcome, and alternative to the recommended procedure have been discussed with the patient. Patient understands and wants to proceed with the procedure.      Electronically signed by Naman Lazaro DPM on 9/13/2023 at 9:11 AM

## 2023-09-13 NOTE — ANESTHESIA PROCEDURE NOTES
Peripheral Block    Patient location during procedure: pre-op  Reason for block: procedure for pain, post-op pain management, primary anesthetic and at surgeon's request  Start time: 9/13/2023 8:33 AM  End time: 9/13/2023 8:43 AM  Staffing  Performed: anesthesiologist   Anesthesiologist: Erika Sanchez MD  Performed by: Erika Sanchez MD  Authorized by: Erika Sanchez MD    Preanesthetic Checklist  Completed: patient identified, IV checked, site marked, risks and benefits discussed, surgical/procedural consents, timeout performed, anesthesia consent given, oxygen available and monitors applied/VS acknowledged  Peripheral Block   Patient position: supine  Prep: ChloraPrep  Provider prep: mask and sterile gloves  Patient monitoring: cardiac monitor, continuous pulse ox, continuous capnometry, frequent blood pressure checks, IV access, oxygen and responsive to questions  Block type: Sciatic and Femoral  Laterality: left  Injection technique: single-shot  Guidance: nerve stimulator and ultrasound guided    Needle   Needle type: Other   Needle gauge: 21 G  Needle localization: nerve stimulator and ultrasound guidance  Needle length: 10 cmOther needle type: STIMUPLEX  Assessment   Injection assessment: negative aspiration for heme, no paresthesia on injection, local visualized surrounding nerve on ultrasound and no intravascular symptoms  Hemodynamics: stable  Outcomes: patient tolerated procedure well    Additional Notes  Saphenous block performed with ultrasound guidance; 4\" stimuplex 21g needle used, 10cc 0.5% ropivacaine injected slowly with intermittent aspiration. Saundra IZQUIERDO witnessed timeout and block written on correct side.

## 2023-09-13 NOTE — PROGRESS NOTES
Pt was provided crutches and was educated on the use of crutches prior to discharge. Pt demonstrated proper use of crutches prior to discharge. Instructions of discharge were reviewed with the pt and her friend, Shai Kirk

## 2023-09-13 NOTE — ANESTHESIA POSTPROCEDURE EVALUATION
Department of Anesthesiology  Postprocedure Note    Patient: Mary Jo Gutierrez  MRN: [id number]  YOB: 1950  Date of evaluation: 9/13/2023      Procedure Summary     Date: 09/13/23 Room / Location: [facility] MAIN OR  / [facility] MAIN OR    Anesthesia Start: 2915 Anesthesia Stop: 5756    Procedure: LEFT FOOT REPAIR OF PERONEAL TENDONS (IV CONSCIOUS SEDATION WITH REGIONAL BLOCK) (Left: Ankle) Diagnosis:       Spontaneous rupture of flexor tendons, left ankle and foot      Left foot pain      (Spontaneous rupture of flexor tendons, left ankle and foot [M66.372])      (Left foot pain [M79.672])    Surgeons: Michelle Schwarz DPM Responsible Provider: Kady Simeon MD    Anesthesia Type: MAC, regional ASA Status: 2          Anesthesia Type: No value filed.     Rosas Phase I: Rosas Score: 10    Rosas Phase II:        Anesthesia Post Evaluation    Patient location during evaluation: PACU  Patient participation: complete - patient participated  Level of consciousness: awake and alert  Pain score: 0  Airway patency: patent  Nausea & Vomiting: no nausea and no vomiting  Complications: no  Cardiovascular status: hemodynamically stable  Respiratory status: room air  Hydration status: stable

## 2024-01-25 ENCOUNTER — HOSPITAL ENCOUNTER (OUTPATIENT)
Facility: HOSPITAL | Age: 74
Discharge: HOME | End: 2024-01-25
Payer: MEDICARE

## 2024-01-25 VITALS — WEIGHT: 135 LBS | BODY MASS INDEX: 24.84 KG/M2 | HEIGHT: 62 IN

## 2024-01-25 DIAGNOSIS — Z12.31 VISIT FOR SCREENING MAMMOGRAM: ICD-10-CM

## 2024-01-25 PROCEDURE — 77063 BREAST TOMOSYNTHESIS BI: CPT

## 2024-03-07 ENCOUNTER — HOSPITAL ENCOUNTER (OUTPATIENT)
Facility: HOSPITAL | Age: 74
Setting detail: OUTPATIENT SURGERY
Discharge: HOME | End: 2024-03-07
Attending: INTERNAL MEDICINE | Admitting: INTERNAL MEDICINE
Payer: MEDICARE

## 2024-03-07 ENCOUNTER — ANESTHESIA (OUTPATIENT)
Facility: HOSPITAL | Age: 74
End: 2024-03-07
Payer: MEDICARE

## 2024-03-07 ENCOUNTER — ANESTHESIA EVENT (OUTPATIENT)
Facility: HOSPITAL | Age: 74
End: 2024-03-07
Payer: MEDICARE

## 2024-03-07 VITALS
SYSTOLIC BLOOD PRESSURE: 117 MMHG | HEART RATE: 70 BPM | OXYGEN SATURATION: 97 % | WEIGHT: 149.2 LBS | BODY MASS INDEX: 27.46 KG/M2 | HEIGHT: 62 IN | RESPIRATION RATE: 13 BRPM | DIASTOLIC BLOOD PRESSURE: 70 MMHG | TEMPERATURE: 98.7 F

## 2024-03-07 PROBLEM — T41.45XA ADVERSE EFFECT OF ANESTHESIA: Status: ACTIVE | Noted: 2024-03-07

## 2024-03-07 PROBLEM — T88.3XXA MALIGNANT HYPERTHERMIA: Status: ACTIVE | Noted: 2024-03-07

## 2024-03-07 PROCEDURE — 3700000000 HC ANESTHESIA ATTENDED CARE: Performed by: INTERNAL MEDICINE

## 2024-03-07 PROCEDURE — 3700000001 HC ADD 15 MINUTES (ANESTHESIA): Performed by: INTERNAL MEDICINE

## 2024-03-07 PROCEDURE — 6360000002 HC RX W HCPCS: Performed by: ANESTHESIOLOGY

## 2024-03-07 PROCEDURE — 2500000003 HC RX 250 WO HCPCS: Performed by: ANESTHESIOLOGY

## 2024-03-07 PROCEDURE — 2580000003 HC RX 258: Performed by: INTERNAL MEDICINE

## 2024-03-07 PROCEDURE — 3600007502: Performed by: INTERNAL MEDICINE

## 2024-03-07 PROCEDURE — 88305 TISSUE EXAM BY PATHOLOGIST: CPT

## 2024-03-07 PROCEDURE — 7100000010 HC PHASE II RECOVERY - FIRST 15 MIN: Performed by: INTERNAL MEDICINE

## 2024-03-07 PROCEDURE — 7100000011 HC PHASE II RECOVERY - ADDTL 15 MIN: Performed by: INTERNAL MEDICINE

## 2024-03-07 PROCEDURE — 3600007512: Performed by: INTERNAL MEDICINE

## 2024-03-07 RX ORDER — SODIUM CHLORIDE 9 MG/ML
INJECTION, SOLUTION INTRAVENOUS CONTINUOUS
Status: DISCONTINUED | OUTPATIENT
Start: 2024-03-07 | End: 2024-03-07 | Stop reason: HOSPADM

## 2024-03-07 RX ORDER — SODIUM CHLORIDE 0.9 % (FLUSH) 0.9 %
5-40 SYRINGE (ML) INJECTION PRN
Status: DISCONTINUED | OUTPATIENT
Start: 2024-03-07 | End: 2024-03-07 | Stop reason: HOSPADM

## 2024-03-07 RX ORDER — SODIUM CHLORIDE 9 MG/ML
25 INJECTION, SOLUTION INTRAVENOUS PRN
Status: DISCONTINUED | OUTPATIENT
Start: 2024-03-07 | End: 2024-03-07 | Stop reason: HOSPADM

## 2024-03-07 RX ORDER — SODIUM CHLORIDE 0.9 % (FLUSH) 0.9 %
5-40 SYRINGE (ML) INJECTION EVERY 12 HOURS SCHEDULED
Status: DISCONTINUED | OUTPATIENT
Start: 2024-03-07 | End: 2024-03-07 | Stop reason: HOSPADM

## 2024-03-07 RX ORDER — LIDOCAINE HYDROCHLORIDE 20 MG/ML
INJECTION, SOLUTION EPIDURAL; INFILTRATION; INTRACAUDAL; PERINEURAL PRN
Status: DISCONTINUED | OUTPATIENT
Start: 2024-03-07 | End: 2024-03-07 | Stop reason: SDUPTHER

## 2024-03-07 RX ADMIN — LIDOCAINE HYDROCHLORIDE 50 MG: 20 INJECTION, SOLUTION EPIDURAL; INFILTRATION; INTRACAUDAL; PERINEURAL at 09:15

## 2024-03-07 RX ADMIN — PROPOFOL 25 MG: 10 INJECTION, EMULSION INTRAVENOUS at 09:32

## 2024-03-07 RX ADMIN — LIDOCAINE HYDROCHLORIDE 25 MG: 20 INJECTION, SOLUTION EPIDURAL; INFILTRATION; INTRACAUDAL; PERINEURAL at 09:19

## 2024-03-07 RX ADMIN — LIDOCAINE HYDROCHLORIDE 25 MG: 20 INJECTION, SOLUTION EPIDURAL; INFILTRATION; INTRACAUDAL; PERINEURAL at 09:22

## 2024-03-07 RX ADMIN — PROPOFOL 25 MG: 10 INJECTION, EMULSION INTRAVENOUS at 09:18

## 2024-03-07 RX ADMIN — PROPOFOL 25 MG: 10 INJECTION, EMULSION INTRAVENOUS at 09:25

## 2024-03-07 RX ADMIN — SODIUM CHLORIDE: 9 INJECTION, SOLUTION INTRAVENOUS at 09:10

## 2024-03-07 RX ADMIN — PROPOFOL 25 MG: 10 INJECTION, EMULSION INTRAVENOUS at 09:22

## 2024-03-07 RX ADMIN — PROPOFOL 25 MG: 10 INJECTION, EMULSION INTRAVENOUS at 09:27

## 2024-03-07 RX ADMIN — PROPOFOL 25 MG: 10 INJECTION, EMULSION INTRAVENOUS at 09:20

## 2024-03-07 RX ADMIN — PROPOFOL 50 MG: 10 INJECTION, EMULSION INTRAVENOUS at 09:15

## 2024-03-07 ASSESSMENT — PAIN - FUNCTIONAL ASSESSMENT: PAIN_FUNCTIONAL_ASSESSMENT: NONE - DENIES PAIN

## 2024-03-07 NOTE — DISCHARGE INSTRUCTIONS
results  - repeat colonoscopy in 5 years     Please let me or my office staff know if you have any feedback about today's procedure.    DEMETRI Abernathy Jr, MD

## 2024-03-07 NOTE — INTERVAL H&P NOTE
Pre-Endoscopy H&P Update  Chief complaint/HPI/ROS:  The indication for the procedure, the patient's history and the patient's current medications are reviewed prior to the procedure and that data is reported on the H&P to which this document is attached.  Any significant complaints with regard to organ systems will be noted, and if not mentioned then a review of systems is not contributory.  Past Medical History:   Diagnosis Date    Adverse effect of anesthesia     Pt has a rare blood type - O neg with anti-M antibodies    Benign essential hypertension     Blood type O-(neg) 10/2014    with anti-M antibodies ([place] had to obtain from out of state in the past).    Diverticulitis 8/3/2010    Follow up colonoscopy neg    Diverticulosis 03/10/2017    Fatty liver disease, nonalcoholic 6/5/2017    H/O colonoscopy 03/10/2017    History of esophageal ulcer (age 10)    has not recurred    History of ganglion cyst     left wrist - removed    History of shingles 2016    Hypercholesterolemia with hypertriglyceridemia 2005    controlled on medication    Hypovitaminosis D     Ill-defined condition     ESOPHAGEAL ULCER    Kidney stone     Migraines     migraines    MVP (mitral valve prolapse)     Personal history of nicotine dependence 6/5/2017    Smoking 6/5/2017      Past Surgical History:   Procedure Laterality Date    ACHILLES TENDON SURGERY Left 9/13/2023    LEFT FOOT REPAIR OF PERONEAL TENDONS (IV CONSCIOUS SEDATION WITH REGIONAL BLOCK) performed by Davidson Dodson DPM at [facility] MAIN OR    APPENDECTOMY      Age 6    COLONOSCOPY N/A 3/10/2017    COLONOSCOPY performed by Colby Wallace MD at [facility] ENDOSCOPY    COLONOSCOPY  2010 2020 for next    CYSTOURETHROSCOPY  10/9/2014     performed by Vik Shoemaker MD at [facility] MAIN OR    GYN  1987    BTL    GYN  10/2014    Lap LEONA/BSO    LAPAROSCOPY W TOT HYSTERECTUTERUS <=250 GRAM  W TUBE/OVARY N/A 10/9/2014    ROBOTIC ASSISTED TOTAL LAPAROSCOPIC HYSTERECTOMY / BILATERAL

## 2024-03-07 NOTE — ANESTHESIA POSTPROCEDURE EVALUATION
Department of Anesthesiology  Postprocedure Note    Patient: Christin Aguayo  MRN: [id number]  YOB: 1950  Date of evaluation: 3/7/2024    Procedure Summary       Date: 03/07/24 Room / Location: [facility] ENDO 04 / [facility]OS[facility]    Anesthesia Start: 0910 Anesthesia Stop: 0942    Procedure: COLONOSCOPY (Lower GI Region) Diagnosis:       Diverticulitis of large intestine, unspecified bleeding status, unspecified complication status      Diverticulosis, sigmoid      (Diverticulitis of large intestine, unspecified bleeding status, unspecified complication status [K57.32])      (Diverticulosis, sigmoid [K57.30])    Surgeons: Jennifer Abernathy MD Responsible Provider: Jean-Paul Maldonado MD    Anesthesia Type: MAC ASA Status: 3            Anesthesia Type: MAC    Rosas Phase I: Rosas Score: 10    Rosas Phase II: Rosas Score: 9    Anesthesia Post Evaluation    Patient location during evaluation: bedside  Patient participation: complete - patient participated  Level of consciousness: awake  Airway patency: patent  Nausea & Vomiting: no nausea  Cardiovascular status: blood pressure returned to baseline  Respiratory status: acceptable  Hydration status: euvolemic  Pain management: adequate    No notable events documented.

## 2024-03-07 NOTE — PROGRESS NOTES

## 2024-03-07 NOTE — OP NOTE
[facility]  [facility]  DEMETRI Abernathy Jr, MD  [phone]      2024    Colonoscopy Procedure Note  Christin Aguayo  :  1950  [facility] Medical Record Number: [id number]    Indications:   personal history of colon polyps  PCP:  Denys Alejandro MD  Anesthesia/Sedation: See Anesthesia Record  Endoscopist:  Dr. DEMETRI Abernathy Jr  Complications:  None  Estimated Blood Loss:  None    Surgical assistant: Circulator: Ernestina Ashley RN  Endoscopy Technician: Cody Amezcua none unless otherwise specified.     Permit:  The indications, risks, benefits and alternatives were reviewed with the patient or their decision maker who was provided an opportunity to ask questions and all questions were answered.  The specific risks of colonoscopy with conscious sedation were reviewed, including but not limited to anesthetic complication, bleeding, adverse drug reaction, missed lesion, infection, IV site reactions, and intestinal perforation which would lead to the need for surgical repair.  Alternatives to colonoscopy including radiographic imaging, observation without testing, or laboratory testing were reviewed including the limitations of those alternatives.  After considering the options and having all their questions answered, the patient or their decision maker provided both verbal and written consent to proceed.        Procedure in Detail:  After obtaining informed consent, positioning of the patient in the left lateral decubitus position, and conduction of a pre-procedure pause or \"time out\" the endoscope was introduced into the anus and advanced to the cecum, which was identified by the ileocecal valve and appendiceal orifice.  The quality of the colonic preparation was adequate.  A careful inspection was made as the colonoscope was withdrawn, findings and interventions are described below.    Findings:

## 2024-03-07 NOTE — H&P
73 y.o. female presents for open access colonoscopy for surveillance given a personal history of colon polyps.  Additional H&P data will be attached on the day of procedure.    Jennifer Abernathy Jr, MD

## 2024-03-07 NOTE — ANESTHESIA PRE PROCEDURE
Department of Anesthesiology  Preprocedure Note       Name:  Christin Aguayo   Age:  73 y.o.  :  1950                                          MRN:  [id number]         Date:  3/7/2024      Surgeon: Surgeon(s):  Jennifer Abernathy MD    Procedure: Procedure(s):  COLONOSCOPY    Medications prior to admission:   Prior to Admission medications    Medication Sig Start Date End Date Taking? Authorizing Provider   Rosuvastatin Calcium 20 MG CPSP Take by mouth nightly    Provider, MD Alyssa   amLODIPine (NORVASC) 5 MG tablet Take 1 tablet by mouth nightly    Automatic Reconciliation, Ar   butalbital-acetaminophen-caffeine (FIORICET, ESGIC) -40 MG per tablet TAKE 1 TABLET BY MOUTH DAILY AS NEEDED FOR PAIN/ HEADACHE  Patient not taking: Reported on 3/7/2024 9/18/17   Automatic Reconciliation, Ar   diazePAM (VALIUM) 5 MG tablet TK 1 T PO BID PRN  Patient not taking: Reported on 3/7/2024 6/19/19   Automatic Reconciliation, Ar       Current medications:    Current Facility-Administered Medications   Medication Dose Route Frequency Provider Last Rate Last Admin   • 0.9 % sodium chloride infusion   IntraVENous Continuous Jennifer Abernathy MD   New Bag at 24 0910   • sodium chloride flush 0.9 % injection 5-40 mL  5-40 mL IntraVENous 2 times per day Jennifer Abernathy MD       • sodium chloride flush 0.9 % injection 5-40 mL  5-40 mL IntraVENous PRN Jennifer Abernathy MD       • 0.9 % sodium chloride infusion  25 mL IntraVENous PRN Jennifer Abernathy MD         Facility-Administered Medications Ordered in Other Encounters   Medication Dose Route Frequency Provider Last Rate Last Admin   • lidocaine PF 2 % injection   IntraVENous PRN Annemarie Mahan APRN - CRNA   50 mg at 24 0915   • propofol infusion   IntraVENous PRN Annemarie Mahan APRN - CRNA   25 mg at 24 0918       Allergies:    Allergies   Allergen Reactions   • Codeine Nausea Only     hives       Problem List:    Patient

## 2024-09-21 ENCOUNTER — HOSPITAL ENCOUNTER (OUTPATIENT)
Facility: HOSPITAL | Age: 74
Discharge: HOME | End: 2024-09-23
Payer: MEDICARE

## 2024-09-21 ENCOUNTER — HOSPITAL ENCOUNTER (OUTPATIENT)
Facility: HOSPITAL | Age: 74
Discharge: HOME | End: 2024-09-24
Payer: MEDICARE

## 2024-09-21 DIAGNOSIS — M79.605 PAIN IN LEFT LEG: ICD-10-CM

## 2024-09-21 DIAGNOSIS — M54.16 RADICULOPATHY, LUMBAR REGION: ICD-10-CM

## 2024-09-21 DIAGNOSIS — M79.604 PAIN IN RIGHT LEG: ICD-10-CM

## 2024-09-21 PROCEDURE — 72148 MRI LUMBAR SPINE W/O DYE: CPT

## 2024-09-21 PROCEDURE — 93971 EXTREMITY STUDY: CPT

## 2024-11-24 ENCOUNTER — APPOINTMENT (OUTPATIENT)
Facility: HOSPITAL | Age: 74
DRG: 206 | End: 2024-11-24
Payer: MEDICARE

## 2024-11-24 ENCOUNTER — HOSPITAL ENCOUNTER (INPATIENT)
Facility: HOSPITAL | Age: 74
LOS: 1 days | Discharge: HOME | DRG: 206 | End: 2024-11-26
Attending: EMERGENCY MEDICINE | Admitting: INTERNAL MEDICINE
Payer: MEDICARE

## 2024-11-24 DIAGNOSIS — R07.9 CHEST PAIN, UNSPECIFIED TYPE: ICD-10-CM

## 2024-11-24 DIAGNOSIS — I24.9 ACS (ACUTE CORONARY SYNDROME) (HCC): Primary | ICD-10-CM

## 2024-11-24 DIAGNOSIS — F41.9 ANXIETY: ICD-10-CM

## 2024-11-24 DIAGNOSIS — I20.9 ANGINA PECTORIS (HCC): ICD-10-CM

## 2024-11-24 PROBLEM — I20.0 UNSTABLE ANGINA (HCC): Status: ACTIVE | Noted: 2024-11-24

## 2024-11-24 LAB
ANION GAP SERPL CALC-SCNC: 9 MMOL/L (ref 2–12)
APTT PPP: 25.4 SEC (ref 22.1–31)
BASOPHILS # BLD: 0 K/UL (ref 0–0.1)
BASOPHILS NFR BLD: 1 % (ref 0–1)
BUN SERPL-MCNC: 15 MG/DL (ref 6–20)
BUN/CREAT SERPL: 15 (ref 12–20)
CALCIUM SERPL-MCNC: 9.3 MG/DL (ref 8.5–10.1)
CHLORIDE SERPL-SCNC: 102 MMOL/L (ref 97–108)
CO2 SERPL-SCNC: 22 MMOL/L (ref 21–32)
COMMENT:: NORMAL
CREAT SERPL-MCNC: 1.03 MG/DL (ref 0.55–1.02)
DIFFERENTIAL METHOD BLD: ABNORMAL
EKG ATRIAL RATE: 85 BPM
EKG DIAGNOSIS: NORMAL
EKG P AXIS: 75 DEGREES
EKG P-R INTERVAL: 196 MS
EKG Q-T INTERVAL: 394 MS
EKG QRS DURATION: 80 MS
EKG QTC CALCULATION (BAZETT): 468 MS
EKG R AXIS: 80 DEGREES
EKG T AXIS: 19 DEGREES
EKG VENTRICULAR RATE: 85 BPM
EOSINOPHIL # BLD: 0.1 K/UL (ref 0–0.4)
EOSINOPHIL NFR BLD: 1 % (ref 0–7)
ERYTHROCYTE [DISTWIDTH] IN BLOOD BY AUTOMATED COUNT: 12.3 % (ref 11.5–14.5)
ERYTHROCYTE [DISTWIDTH] IN BLOOD BY AUTOMATED COUNT: 12.4 % (ref 11.5–14.5)
GLUCOSE SERPL-MCNC: 109 MG/DL (ref 65–100)
HCT VFR BLD AUTO: 32.1 % (ref 35–47)
HCT VFR BLD AUTO: 32.5 % (ref 35–47)
HGB BLD-MCNC: 11 G/DL (ref 11.5–16)
HGB BLD-MCNC: 11.2 G/DL (ref 11.5–16)
IMM GRANULOCYTES # BLD AUTO: 0 K/UL (ref 0–0.04)
IMM GRANULOCYTES NFR BLD AUTO: 0 % (ref 0–0.5)
INR PPP: 1 (ref 0.9–1.1)
LYMPHOCYTES # BLD: 1.9 K/UL (ref 0.8–3.5)
LYMPHOCYTES NFR BLD: 25 % (ref 12–49)
MCH RBC QN AUTO: 30.4 PG (ref 26–34)
MCH RBC QN AUTO: 30.7 PG (ref 26–34)
MCHC RBC AUTO-ENTMCNC: 34.3 G/DL (ref 30–36.5)
MCHC RBC AUTO-ENTMCNC: 34.5 G/DL (ref 30–36.5)
MCV RBC AUTO: 88.3 FL (ref 80–99)
MCV RBC AUTO: 89.7 FL (ref 80–99)
MONOCYTES # BLD: 0.7 K/UL (ref 0–1)
MONOCYTES NFR BLD: 10 % (ref 5–13)
NEUTS SEG # BLD: 4.8 K/UL (ref 1.8–8)
NEUTS SEG NFR BLD: 63 % (ref 32–75)
NRBC # BLD: 0 K/UL (ref 0–0.01)
NRBC # BLD: 0 K/UL (ref 0–0.01)
NRBC BLD-RTO: 0 PER 100 WBC
NRBC BLD-RTO: 0 PER 100 WBC
PLATELET # BLD AUTO: 358 K/UL (ref 150–400)
PLATELET # BLD AUTO: 361 K/UL (ref 150–400)
PMV BLD AUTO: 9.6 FL (ref 8.9–12.9)
PMV BLD AUTO: 9.7 FL (ref 8.9–12.9)
POTASSIUM SERPL-SCNC: 3.3 MMOL/L (ref 3.5–5.1)
PROTHROMBIN TIME: 10.6 SEC (ref 9–11.1)
RBC # BLD AUTO: 3.58 M/UL (ref 3.8–5.2)
RBC # BLD AUTO: 3.68 M/UL (ref 3.8–5.2)
SODIUM SERPL-SCNC: 133 MMOL/L (ref 136–145)
SPECIMEN HOLD: NORMAL
THERAPEUTIC RANGE: NORMAL SECS (ref 58–77)
TROPONIN I SERPL HS-MCNC: 10 NG/L (ref 0–51)
TROPONIN I SERPL HS-MCNC: 12 NG/L (ref 0–51)
TROPONIN I SERPL HS-MCNC: 13 NG/L (ref 0–51)
UFH PPP CHRO-ACNC: 0.83 IU/ML
UFH PPP CHRO-ACNC: <0.1 IU/ML
WBC # BLD AUTO: 7.5 K/UL (ref 3.6–11)
WBC # BLD AUTO: 8.1 K/UL (ref 3.6–11)

## 2024-11-24 PROCEDURE — 2580000003 HC RX 258: Performed by: STUDENT IN AN ORGANIZED HEALTH CARE EDUCATION/TRAINING PROGRAM

## 2024-11-24 PROCEDURE — 96376 TX/PRO/DX INJ SAME DRUG ADON: CPT

## 2024-11-24 PROCEDURE — 96375 TX/PRO/DX INJ NEW DRUG ADDON: CPT

## 2024-11-24 PROCEDURE — 96365 THER/PROPH/DIAG IV INF INIT: CPT

## 2024-11-24 PROCEDURE — 80048 BASIC METABOLIC PNL TOTAL CA: CPT

## 2024-11-24 PROCEDURE — 85610 PROTHROMBIN TIME: CPT

## 2024-11-24 PROCEDURE — 71045 X-RAY EXAM CHEST 1 VIEW: CPT

## 2024-11-24 PROCEDURE — 84484 ASSAY OF TROPONIN QUANT: CPT

## 2024-11-24 PROCEDURE — 85730 THROMBOPLASTIN TIME PARTIAL: CPT

## 2024-11-24 PROCEDURE — 6370000000 HC RX 637 (ALT 250 FOR IP): Performed by: EMERGENCY MEDICINE

## 2024-11-24 PROCEDURE — 85027 COMPLETE CBC AUTOMATED: CPT

## 2024-11-24 PROCEDURE — 6360000002 HC RX W HCPCS: Performed by: EMERGENCY MEDICINE

## 2024-11-24 PROCEDURE — 93005 ELECTROCARDIOGRAM TRACING: CPT | Performed by: EMERGENCY MEDICINE

## 2024-11-24 PROCEDURE — 99285 EMERGENCY DEPT VISIT HI MDM: CPT

## 2024-11-24 PROCEDURE — 85520 HEPARIN ASSAY: CPT

## 2024-11-24 PROCEDURE — G0378 HOSPITAL OBSERVATION PER HR: HCPCS

## 2024-11-24 PROCEDURE — 36415 COLL VENOUS BLD VENIPUNCTURE: CPT

## 2024-11-24 PROCEDURE — 6370000000 HC RX 637 (ALT 250 FOR IP): Performed by: STUDENT IN AN ORGANIZED HEALTH CARE EDUCATION/TRAINING PROGRAM

## 2024-11-24 PROCEDURE — 96366 THER/PROPH/DIAG IV INF ADDON: CPT

## 2024-11-24 PROCEDURE — 85025 COMPLETE CBC W/AUTO DIFF WBC: CPT

## 2024-11-24 RX ORDER — POLYETHYLENE GLYCOL 3350 17 G/17G
17 POWDER, FOR SOLUTION ORAL DAILY PRN
Status: DISCONTINUED | OUTPATIENT
Start: 2024-11-24 | End: 2024-11-26 | Stop reason: HOSPADM

## 2024-11-24 RX ORDER — ROSUVASTATIN CALCIUM 10 MG/1
20 TABLET, COATED ORAL
Status: DISCONTINUED | OUTPATIENT
Start: 2024-11-24 | End: 2024-11-26 | Stop reason: HOSPADM

## 2024-11-24 RX ORDER — POTASSIUM CHLORIDE 7.45 MG/ML
10 INJECTION INTRAVENOUS PRN
Status: DISCONTINUED | OUTPATIENT
Start: 2024-11-24 | End: 2024-11-25

## 2024-11-24 RX ORDER — MORPHINE SULFATE 2 MG/ML
2 INJECTION, SOLUTION INTRAMUSCULAR; INTRAVENOUS EVERY 4 HOURS PRN
Status: DISCONTINUED | OUTPATIENT
Start: 2024-11-24 | End: 2024-11-26 | Stop reason: HOSPADM

## 2024-11-24 RX ORDER — BUTALBITAL, ACETAMINOPHEN AND CAFFEINE 50; 325; 40 MG/1; MG/1; MG/1
1 TABLET ORAL 2 TIMES DAILY PRN
Status: DISCONTINUED | OUTPATIENT
Start: 2024-11-24 | End: 2024-11-26 | Stop reason: HOSPADM

## 2024-11-24 RX ORDER — SODIUM CHLORIDE 9 MG/ML
INJECTION, SOLUTION INTRAVENOUS CONTINUOUS
Status: DISPENSED | OUTPATIENT
Start: 2024-11-25 | End: 2024-11-25

## 2024-11-24 RX ORDER — HEPARIN SODIUM 1000 [USP'U]/ML
60 INJECTION, SOLUTION INTRAVENOUS; SUBCUTANEOUS PRN
Status: DISCONTINUED | OUTPATIENT
Start: 2024-11-24 | End: 2024-11-25

## 2024-11-24 RX ORDER — MAGNESIUM SULFATE IN WATER 40 MG/ML
2000 INJECTION, SOLUTION INTRAVENOUS PRN
Status: DISCONTINUED | OUTPATIENT
Start: 2024-11-24 | End: 2024-11-26 | Stop reason: HOSPADM

## 2024-11-24 RX ORDER — NITROGLYCERIN 0.4 MG/1
0.4 TABLET SUBLINGUAL EVERY 5 MIN PRN
Status: DISCONTINUED | OUTPATIENT
Start: 2024-11-24 | End: 2024-11-26 | Stop reason: HOSPADM

## 2024-11-24 RX ORDER — HEPARIN SODIUM 10000 [USP'U]/100ML
5-30 INJECTION, SOLUTION INTRAVENOUS CONTINUOUS
Status: DISCONTINUED | OUTPATIENT
Start: 2024-11-24 | End: 2024-11-25

## 2024-11-24 RX ORDER — SODIUM CHLORIDE 0.9 % (FLUSH) 0.9 %
5-40 SYRINGE (ML) INJECTION PRN
Status: DISCONTINUED | OUTPATIENT
Start: 2024-11-24 | End: 2024-11-26 | Stop reason: HOSPADM

## 2024-11-24 RX ORDER — POTASSIUM CHLORIDE 750 MG/1
40 TABLET, EXTENDED RELEASE ORAL PRN
Status: DISCONTINUED | OUTPATIENT
Start: 2024-11-24 | End: 2024-11-25

## 2024-11-24 RX ORDER — POTASSIUM CHLORIDE 750 MG/1
40 TABLET, EXTENDED RELEASE ORAL ONCE
Status: COMPLETED | OUTPATIENT
Start: 2024-11-24 | End: 2024-11-24

## 2024-11-24 RX ORDER — MORPHINE SULFATE 4 MG/ML
4 INJECTION, SOLUTION INTRAMUSCULAR; INTRAVENOUS ONCE
Status: COMPLETED | OUTPATIENT
Start: 2024-11-24 | End: 2024-11-24

## 2024-11-24 RX ORDER — ACETAMINOPHEN 500 MG
1000 TABLET ORAL
Status: COMPLETED | OUTPATIENT
Start: 2024-11-24 | End: 2024-11-24

## 2024-11-24 RX ORDER — ONDANSETRON 2 MG/ML
4 INJECTION INTRAMUSCULAR; INTRAVENOUS EVERY 6 HOURS PRN
Status: DISCONTINUED | OUTPATIENT
Start: 2024-11-24 | End: 2024-11-26 | Stop reason: HOSPADM

## 2024-11-24 RX ORDER — ACETAMINOPHEN 325 MG/1
650 TABLET ORAL EVERY 6 HOURS PRN
Status: DISCONTINUED | OUTPATIENT
Start: 2024-11-24 | End: 2024-11-26 | Stop reason: HOSPADM

## 2024-11-24 RX ORDER — ONDANSETRON 2 MG/ML
4 INJECTION INTRAMUSCULAR; INTRAVENOUS ONCE
Status: COMPLETED | OUTPATIENT
Start: 2024-11-24 | End: 2024-11-24

## 2024-11-24 RX ORDER — SODIUM CHLORIDE 0.9 % (FLUSH) 0.9 %
5-40 SYRINGE (ML) INJECTION EVERY 12 HOURS SCHEDULED
Status: DISCONTINUED | OUTPATIENT
Start: 2024-11-24 | End: 2024-11-26 | Stop reason: HOSPADM

## 2024-11-24 RX ORDER — HEPARIN SODIUM 1000 [USP'U]/ML
30 INJECTION, SOLUTION INTRAVENOUS; SUBCUTANEOUS PRN
Status: DISCONTINUED | OUTPATIENT
Start: 2024-11-24 | End: 2024-11-25

## 2024-11-24 RX ORDER — AMLODIPINE BESYLATE 5 MG/1
5 TABLET ORAL
Status: DISCONTINUED | OUTPATIENT
Start: 2024-11-24 | End: 2024-11-26 | Stop reason: HOSPADM

## 2024-11-24 RX ORDER — ONDANSETRON 4 MG/1
4 TABLET, ORALLY DISINTEGRATING ORAL EVERY 8 HOURS PRN
Status: DISCONTINUED | OUTPATIENT
Start: 2024-11-24 | End: 2024-11-26 | Stop reason: HOSPADM

## 2024-11-24 RX ORDER — SODIUM CHLORIDE 9 MG/ML
INJECTION, SOLUTION INTRAVENOUS PRN
Status: DISCONTINUED | OUTPATIENT
Start: 2024-11-24 | End: 2024-11-26 | Stop reason: HOSPADM

## 2024-11-24 RX ORDER — HEPARIN SODIUM 1000 [USP'U]/ML
60 INJECTION, SOLUTION INTRAVENOUS; SUBCUTANEOUS ONCE
Status: COMPLETED | OUTPATIENT
Start: 2024-11-24 | End: 2024-11-24

## 2024-11-24 RX ORDER — ACETAMINOPHEN 650 MG/1
650 SUPPOSITORY RECTAL EVERY 6 HOURS PRN
Status: DISCONTINUED | OUTPATIENT
Start: 2024-11-24 | End: 2024-11-26 | Stop reason: HOSPADM

## 2024-11-24 RX ADMIN — Medication 5 ML: at 21:04

## 2024-11-24 RX ADMIN — HEPARIN SODIUM 12 UNITS/KG/HR: 10000 INJECTION, SOLUTION INTRAVENOUS at 15:21

## 2024-11-24 RX ADMIN — MORPHINE SULFATE 4 MG: 4 INJECTION, SOLUTION INTRAMUSCULAR; INTRAVENOUS at 14:53

## 2024-11-24 RX ADMIN — ONDANSETRON 4 MG: 2 INJECTION INTRAMUSCULAR; INTRAVENOUS at 14:46

## 2024-11-24 RX ADMIN — HEPARIN SODIUM 3900 UNITS: 1000 INJECTION INTRAVENOUS; SUBCUTANEOUS at 15:15

## 2024-11-24 RX ADMIN — NITROGLYCERIN 0.4 MG: 0.4 TABLET SUBLINGUAL at 12:55

## 2024-11-24 RX ADMIN — POTASSIUM CHLORIDE 40 MEQ: 750 TABLET, EXTENDED RELEASE ORAL at 13:07

## 2024-11-24 RX ADMIN — AMLODIPINE BESYLATE 5 MG: 5 TABLET ORAL at 20:58

## 2024-11-24 RX ADMIN — ACETAMINOPHEN 1000 MG: 500 TABLET ORAL at 12:15

## 2024-11-24 RX ADMIN — ROSUVASTATIN 20 MG: 10 TABLET, FILM COATED ORAL at 20:57

## 2024-11-24 RX ADMIN — NITROGLYCERIN 0.4 MG: 0.4 TABLET SUBLINGUAL at 12:15

## 2024-11-24 RX ADMIN — SODIUM CHLORIDE: 9 INJECTION, SOLUTION INTRAVENOUS at 23:43

## 2024-11-24 RX ADMIN — HEPARIN SODIUM 10 UNITS/KG/HR: 10000 INJECTION, SOLUTION INTRAVENOUS at 21:45

## 2024-11-24 ASSESSMENT — PAIN SCALES - GENERAL
PAINLEVEL_OUTOF10: 0
PAINLEVEL_OUTOF10: 8
PAINLEVEL_OUTOF10: 0

## 2024-11-24 ASSESSMENT — PAIN - FUNCTIONAL ASSESSMENT
PAIN_FUNCTIONAL_ASSESSMENT: 0-10
PAIN_FUNCTIONAL_ASSESSMENT: NONE - DENIES PAIN
PAIN_FUNCTIONAL_ASSESSMENT: ACTIVITIES ARE NOT PREVENTED

## 2024-11-24 ASSESSMENT — PAIN DESCRIPTION - LOCATION: LOCATION: CHEST

## 2024-11-24 ASSESSMENT — PAIN DESCRIPTION - ORIENTATION: ORIENTATION: MID

## 2024-11-24 ASSESSMENT — PAIN DESCRIPTION - ONSET: ONSET: ON-GOING

## 2024-11-24 ASSESSMENT — PAIN DESCRIPTION - PAIN TYPE: TYPE: ACUTE PAIN

## 2024-11-24 ASSESSMENT — LIFESTYLE VARIABLES
HOW MANY STANDARD DRINKS CONTAINING ALCOHOL DO YOU HAVE ON A TYPICAL DAY: PATIENT DOES NOT DRINK
HOW OFTEN DO YOU HAVE A DRINK CONTAINING ALCOHOL: NEVER

## 2024-11-24 ASSESSMENT — PAIN DESCRIPTION - DESCRIPTORS: DESCRIPTORS: ACHING;DISCOMFORT

## 2024-11-24 ASSESSMENT — PAIN DESCRIPTION - FREQUENCY: FREQUENCY: CONTINUOUS

## 2024-11-24 NOTE — ED PROVIDER NOTES
1.03 (H)  0.55 - 1.02 MG/DL Final    BUN/Creatinine Ratio 11/24/2024 15  12 - 20   Final    Est, Glom Filt Rate 11/24/2024 57 (L)  >60 ml/min/1.73m2 Final    Comment:    Pediatric calculator link: https://www.kidney.org/professionals/kdoqi/gfr_calculatorped     These results are not intended for use in patients <18 years of age.     eGFR results are calculated without a race factor using  the 2021 CKD-EPI equation. Careful clinical correlation is recommended, particularly when comparing to results calculated using previous equations.  The CKD-EPI equation is less accurate in patients with extremes of muscle mass, extra-renal metabolism of creatinine, excessive creatine ingestion, or following therapy that affects renal tubular secretion.      Calcium 11/24/2024 9.3  8.5 - 10.1 MG/DL Final    Troponin, High Sensitivity 11/24/2024 10  0 - 51 ng/L Final    Comment: A HS troponin value change of (+ or -) 50% or more below the 99th percentile, in a 1/2/3 hr interval represents a significant change. Clinical correlation is recommended.  A HS troponin value change of (+ or -) 20% or above the 99th percentile, in a 1/2/3 hr interval represents a significant change. Clinical correlation is recommended.  99th Percentile:   Women: 0-51 ng/L                                                                Men:   0-76 ng/L  Patients taking more than 20 mg/day of biotin may have falsely negative results and should not use this test.      Ventricular Rate 11/24/2024 85  BPM Final    Atrial Rate 11/24/2024 85  BPM Final    P-R Interval 11/24/2024 196  ms Final    QRS Duration 11/24/2024 80  ms Final    Q-T Interval 11/24/2024 394  ms Final    QTc Calculation (Bazett) 11/24/2024 468  ms Final    P Axis 11/24/2024 75  degrees Final    R Axis 11/24/2024 80  degrees Final    T Axis 11/24/2024 19  degrees Final    Diagnosis 11/24/2024    Final                    Value:Normal sinus rhythm  Nonspecific ST abnormality  Abnormal ECG  When  on presentation. Ordered EKG, Cxr, labs. SL NTG for pain. Monitor and reassess.    1900 Pt remains stable. Minimal improvement after ntg but felt better after morphine. EKG showing SR w/o DARIN but diffuse STD. Repeat EKG showing improvement of STD still no DARIN. Initial trop neg, repeating now. Labs show mild hypokalemia which we replaced. CXR ok. Given asa by EMS. I spoke w/ cardiology (Dr Cohn), recommends heparin and pain control for unstable angina, no intervention for now but they will see. Step down admit.    Amount and/or Complexity of Data Reviewed  Labs: ordered. Decision-making details documented in ED Course.  Radiology: ordered. Decision-making details documented in ED Course.  ECG/medicine tests: ordered and independent interpretation performed. Decision-making details documented in ED Course.    Risk  OTC drugs.  Prescription drug management.  Decision regarding hospitalization.            ED Course            CONSULTS:  IP CONSULT TO CARDIOLOGY    PROCEDURES:  Unless otherwise noted below, none     Procedures  Total critical care time (not including time spent performing separately reportable procedures): 44      FINAL IMPRESSION      1. ACS (acute coronary syndrome) ([facility])          DISPOSITION/PLAN   DISPOSITION Admitted 11/24/2024 07:38:19 PM      PATIENT REFERRED TO:  No follow-up provider specified.    DISCHARGE MEDICATIONS:  Current Discharge Medication List            Rufino Duarte MD (electronically signed)  Emergency Attending Physician       Eduardo Serve Consult for Admission  10:33 AM    ED Room Number: 470/01  Patient Name and age:  Christin Aguayo 74 y.o.  female  Working Diagnosis:   1. ACS (acute coronary syndrome) ([facility])        Department: [facility] Adult ED - [phone]  Recommended Level of Care: step down  Readmission: No  Code Status:  Full Code  Sepsis present:  No  Reassessment needed: Yes  Isolation Requirements: no  COVID-19 Suspicion: No    Other:       Rufino Duarte MD  11/25/24

## 2024-11-24 NOTE — PROGRESS NOTES
Consult received for intermittent chest discomfort, now resolved.  Noted nonspecific changes on EKG and 2 very low troponins.  Agree with IV heparin, aspirin, beta-blocker, statin, telemetry monitoring and admission, n.p.o. with medications after 4 AM for ischemic evaluation tomorrow.  Full consult to follow tomorrow.  Please call if any concerns/recurrent chest discomfort overnight.

## 2024-11-24 NOTE — ED TRIAGE NOTES
Patient arrives to the ED for complaints of chest pain x1 hour. Patient also reports shortness of breath and RUQ abdominal tenderness. Denies any cardiac history.     324mg aspirin given in route to ED.

## 2024-11-25 ENCOUNTER — APPOINTMENT (OUTPATIENT)
Facility: HOSPITAL | Age: 74
DRG: 206 | End: 2024-11-25
Payer: MEDICARE

## 2024-11-25 LAB
COMMENT:: NORMAL
COMMENT:: NORMAL
ECHO AO ASC DIAM: 3.7 CM
ECHO AO ASCENDING AORTA INDEX: 2.19 CM/M2
ECHO AO ROOT DIAM: 3.1 CM
ECHO AO ROOT INDEX: 1.83 CM/M2
ECHO AV AREA PEAK VELOCITY: 2 CM2
ECHO AV AREA VTI: 2.1 CM2
ECHO AV AREA/BSA PEAK VELOCITY: 1.2 CM2/M2
ECHO AV AREA/BSA VTI: 1.2 CM2/M2
ECHO AV MEAN GRADIENT: 8 MMHG
ECHO AV MEAN VELOCITY: 1.3 M/S
ECHO AV PEAK GRADIENT: 14 MMHG
ECHO AV PEAK VELOCITY: 1.9 M/S
ECHO AV VELOCITY RATIO: 0.58
ECHO AV VTI: 33.9 CM
ECHO BSA: 1.68 M2
ECHO EST RA PRESSURE: 3 MMHG
ECHO LV E' LATERAL VELOCITY: 5.38 CM/S
ECHO LV E' SEPTAL VELOCITY: 4.74 CM/S
ECHO LV EF PHYSICIAN: 65 %
ECHO LV FRACTIONAL SHORTENING: 45 % (ref 28–44)
ECHO LV INTERNAL DIMENSION DIASTOLE INDEX: 1.95 CM/M2
ECHO LV INTERNAL DIMENSION DIASTOLIC: 3.3 CM (ref 3.9–5.3)
ECHO LV INTERNAL DIMENSION SYSTOLIC INDEX: 1.07 CM/M2
ECHO LV INTERNAL DIMENSION SYSTOLIC: 1.8 CM
ECHO LV IVSD: 1.2 CM (ref 0.6–0.9)
ECHO LV MASS 2D: 124.8 G (ref 67–162)
ECHO LV MASS INDEX 2D: 73.8 G/M2 (ref 43–95)
ECHO LV POSTERIOR WALL DIASTOLIC: 1.2 CM (ref 0.6–0.9)
ECHO LV RELATIVE WALL THICKNESS RATIO: 0.73
ECHO LVOT AREA: 3.5 CM2
ECHO LVOT AV VTI INDEX: 0.62
ECHO LVOT DIAM: 2.1 CM
ECHO LVOT MEAN GRADIENT: 3 MMHG
ECHO LVOT PEAK GRADIENT: 5 MMHG
ECHO LVOT PEAK VELOCITY: 1.1 M/S
ECHO LVOT STROKE VOLUME INDEX: 43.2 ML/M2
ECHO LVOT SV: 73 ML
ECHO LVOT VTI: 21.1 CM
ECHO MV A VELOCITY: 1.11 M/S
ECHO MV AREA PHT: 3.3 CM2
ECHO MV E DECELERATION TIME (DT): 232.7 MS
ECHO MV E VELOCITY: 0.73 M/S
ECHO MV E/A RATIO: 0.66
ECHO MV E/E' LATERAL: 13.57
ECHO MV E/E' RATIO (AVERAGED): 14.48
ECHO MV E/E' SEPTAL: 15.4
ECHO MV PRESSURE HALF TIME (PHT): 67.5 MS
ECHO PV MAX VELOCITY: 0.9 M/S
ECHO PV PEAK GRADIENT: 4 MMHG
ECHO RIGHT VENTRICULAR SYSTOLIC PRESSURE (RVSP): 28 MMHG
ECHO TV REGURGITANT MAX VELOCITY: 2.48 M/S
ECHO TV REGURGITANT PEAK GRADIENT: 25 MMHG
EKG ATRIAL RATE: 82 BPM
EKG DIAGNOSIS: NORMAL
EKG P AXIS: 54 DEGREES
EKG P-R INTERVAL: 206 MS
EKG Q-T INTERVAL: 386 MS
EKG QRS DURATION: 74 MS
EKG QTC CALCULATION (BAZETT): 450 MS
EKG R AXIS: 31 DEGREES
EKG T AXIS: 37 DEGREES
EKG VENTRICULAR RATE: 82 BPM
SPECIMEN HOLD: NORMAL
SPECIMEN HOLD: NORMAL
UFH PPP CHRO-ACNC: 0.21 IU/ML
UFH PPP CHRO-ACNC: 0.33 IU/ML

## 2024-11-25 PROCEDURE — 6370000000 HC RX 637 (ALT 250 FOR IP): Performed by: STUDENT IN AN ORGANIZED HEALTH CARE EDUCATION/TRAINING PROGRAM

## 2024-11-25 PROCEDURE — 96366 THER/PROPH/DIAG IV INF ADDON: CPT

## 2024-11-25 PROCEDURE — 93306 TTE W/DOPPLER COMPLETE: CPT | Performed by: SPECIALIST

## 2024-11-25 PROCEDURE — 99223 1ST HOSP IP/OBS HIGH 75: CPT | Performed by: INTERNAL MEDICINE

## 2024-11-25 PROCEDURE — 2580000003 HC RX 258: Performed by: STUDENT IN AN ORGANIZED HEALTH CARE EDUCATION/TRAINING PROGRAM

## 2024-11-25 PROCEDURE — G0378 HOSPITAL OBSERVATION PER HR: HCPCS

## 2024-11-25 PROCEDURE — 85520 HEPARIN ASSAY: CPT

## 2024-11-25 PROCEDURE — 93010 ELECTROCARDIOGRAM REPORT: CPT | Performed by: SPECIALIST

## 2024-11-25 PROCEDURE — 6360000002 HC RX W HCPCS: Performed by: EMERGENCY MEDICINE

## 2024-11-25 PROCEDURE — 36415 COLL VENOUS BLD VENIPUNCTURE: CPT

## 2024-11-25 PROCEDURE — 93306 TTE W/DOPPLER COMPLETE: CPT

## 2024-11-25 RX ADMIN — Medication 10 ML: at 08:15

## 2024-11-25 RX ADMIN — HEPARIN SODIUM 1900 UNITS: 1000 INJECTION INTRAVENOUS; SUBCUTANEOUS at 05:00

## 2024-11-25 RX ADMIN — AMLODIPINE BESYLATE 5 MG: 5 TABLET ORAL at 20:07

## 2024-11-25 RX ADMIN — Medication 10 ML: at 20:07

## 2024-11-25 RX ADMIN — ROSUVASTATIN 20 MG: 10 TABLET, FILM COATED ORAL at 20:07

## 2024-11-25 RX ADMIN — HEPARIN SODIUM 12 UNITS/KG/HR: 10000 INJECTION, SOLUTION INTRAVENOUS at 05:02

## 2024-11-25 ASSESSMENT — PAIN SCALES - GENERAL
PAINLEVEL_OUTOF10: 0

## 2024-11-25 NOTE — PROGRESS NOTES
0030 TRANSFER - IN REPORT:    Verbal report received from ED on Christin Aguayo  being received from [place] for routine progression of patient care      Report consisted of patient's Situation, Background, Assessment and   Recommendations(SBAR).     Information from the following report(s) Nurse Handoff Report, ED Encounter Summary, ED SBAR, Adult Overview, Intake/Output, MAR, and Recent Results was reviewed with the receiving nurse.    Opportunity for questions and clarification was provided.      Assessment completed upon patient's arrival to unit and care assumed.   0055 Patient arrived on [facility] 470. Ambulated to bed. Oriented to room and call system. Denies pain. Remains on heparin drip.  0345 Labs drawn.  0500 Anti Xa 0.21. 30u/kg heparin bolus given and heparin drip increased to 12u/kg/hr per protocol.  0730 Bedside and Verbal shift change report given to Ann Marie (oncoming nurse) by myesha (offgoing nurse). Report included the following information Nurse Handoff Report, Adult Overview, Intake/Output, MAR, and Recent Results.

## 2024-11-25 NOTE — ED NOTES
TRANSFER - OUT REPORT:    Verbal report given to Michelle RN on Christin Aguayo  being transferred to [facility] for routine progression of patient care       Report consisted of patient's Situation, Background, Assessment and   Recommendations(SBAR).     Information from the following report(s) Nurse Handoff Report, Index, ED Encounter Summary, ED SBAR, Adult Overview, Intake/Output, and MAR was reviewed with the receiving nurse.    Maynard Fall Assessment:    Presents to emergency department  because of falls (Syncope, seizure, or loss of consciousness): No  Age > 70: Yes  Altered Mental Status, Intoxication with alcohol or substance confusion (Disorientation, impaired judgment, poor safety awaremess, or inability to follow instructions): No  Impaired Mobility: Ambulates or transfers with assistive devices or assistance; Unable to ambulate or transer.: No  Nursing Judgement: Yes          Lines:   Peripheral IV 11/24/24 Left Antecubital (Active)   Site Assessment Clean, dry & intact 11/24/24 1158        Opportunity for questions and clarification was provided.      Patient transported with:  Monitor and Registered Nurse

## 2024-11-25 NOTE — H&P
History & Physical    Primary Care Provider: Denys Alejandro MD  Source of Information: Patient and chart review    History of Presenting Illness:   Christin Aguayo is a 74 y.o. female with hx of htn, dyslipidemia, migraines, bryan, hx of tobacco use who presented to ed with complaints of chest pain.  Had been in her usual state of health until earlier this morning when she noted severe, 10/10 sharp pain underneath her left breast.  Has since improved while in the ed.    The patient denies any fever, chills, chest or abdominal pain, nausea, vomiting, cough, congestion, recent illness, palpitations, or dysuria.  Remarkable vitals on ER Presentation: vss  Labs Remarkable for: k-3.3  ER Images: cxr: no acute process  ER Rx: morphine, tylenol, zofran, heprain gtt     Review of Systems:  Pertinent items are noted in the History of Present Illness.     Past Medical History:   Diagnosis Date    Blood type O-(neg) - issue 10/2014    with anti-M antibodies ([place] had to obtain from out of state in the past).    Diverticulitis 8/3/2010    Follow up colonoscopy neg    Diverticulosis 03/10/2017    Fatty liver disease, nonalcoholic 6/5/2017    History of esophageal ulcer (age 10)    has not recurred    History of ganglion cyst     left wrist - removed    History of shingles 2016    HTN     Hypercholesterolemia with hypertriglyceridemia 2005    controlled on medication    Hypovitaminosis D     Kidney stone     Migraines     migraines    MVP (mitral valve prolapse)     Smoker 6/5/2017      Past Surgical History:   Procedure Laterality Date    ACHILLES TENDON SURGERY Left 9/13/2023    LEFT FOOT REPAIR OF PERONEAL TENDONS (IV CONSCIOUS SEDATION WITH REGIONAL BLOCK) performed by Davidson Dodson DPM at [facility] MAIN OR    APPENDECTOMY      Age 6    COLONOSCOPY N/A 3/10/2017    COLONOSCOPY performed by Colby Wallace MD at [facility] ENDOSCOPY    COLONOSCOPY  2010 2020 for next    COLONOSCOPY N/A 3/7/2024    COLONOSCOPY performed by    Result Value Ref Range    WBC 8.1 3.6 - 11.0 K/uL    RBC 3.58 (L) 3.80 - 5.20 M/uL    Hemoglobin 11.0 (L) 11.5 - 16.0 g/dL    Hematocrit 32.1 (L) 35.0 - 47.0 %    MCV 89.7 80.0 - 99.0 FL    MCH 30.7 26.0 - 34.0 PG    MCHC 34.3 30.0 - 36.5 g/dL    RDW 12.4 11.5 - 14.5 %    Platelets 358 150 - 400 K/uL    MPV 9.7 8.9 - 12.9 FL    Nucleated RBCs 0.0 0  WBC    nRBC 0.00 0.00 - 0.01 K/uL   APTT    Collection Time: 11/24/24  2:54 PM   Result Value Ref Range    APTT 25.4 22.1 - 31.0 sec    Therapeutic Range   58.0 - 77.0 SECS   Protime-INR    Collection Time: 11/24/24  2:54 PM   Result Value Ref Range    INR 1.0 0.9 - 1.1      Protime 10.6 9.0 - 11.1 sec   Heparin, Anti-Xa    Collection Time: 11/24/24  2:54 PM   Result Value Ref Range    Heparin Xa,LMWH and Unfrac <0.10 IU/mL         Imaging:     Assessment:     Christin Aguayo is a 74 y.o. female with hx of htn, dyslipidemia, migraines, bryan, hx of tobacco use who is admitted for unstable angina.       Plan:       Chest Wall Pain  -reproducible chest pain on exam consistent with costochondritis  -???heparin gtt per cardiology recs  -nitro and morphine prn  -tsh, lipid panel and A1c in am  -serial trops  -monitor on tele    HTN  -cont pta norvasc 5mg    Dyslipidemia  -cont pta crestor 20mg    Migraine Headaches  -cont pta fioricet prn    BRYAN  -pta valium prn          FEN/GI -  ns@0ml/hr  Activity - as tolerated  DVT prophylaxis - heparin  GI prophylaxis -  none indicated  Disposition - home    CODE STATUS:   full code       Signed By: Hua Esteban MD     November 24, 2024

## 2024-11-25 NOTE — ED NOTES
Provided comfort by giving warm blanket and offering some food such as ginger ale and cracker this time

## 2024-11-25 NOTE — PLAN OF CARE
Problem: Pain  Goal: Verbalizes/displays adequate comfort level or baseline comfort level  Outcome: Progressing  Flowsheets (Taken 11/25/2024 0400 by Lia Delgado RN)  Verbalizes/displays adequate comfort level or baseline comfort level: Assess pain using appropriate pain scale     Problem: Safety - Adult  Goal: Free from fall injury  Outcome: Progressing

## 2024-11-25 NOTE — CONSULTS
[facility] CARDIOLOGY  Cardiology Care Note                  [x]Initial visit     []Established visit     Patient Name: Christin Aguayo - :1950 - MRN:[id number]  Primary Cardiologist: None  Consulting Cardiologist: Tushar Vaz MD       Assessment/Plan/Discussion: Cardiology Attending:     ASSESSMENT  1.  Chest pain atypical ideology  2.  No evidence of interval elevation  3.  Hypertension  4.  Hyperlipidemia  5.  Former tobacco use  6.  History of premature sudden cardiac death in father uncertain if it was an MI      PLAN Christin Aguayo is a very for above-mentioned symptoms.  She does have minor nonspecific ST-T changes on her EKG but no serial troponin elevation or significant delta.  Echocardiac does not show any wall motion abnormalities.  Reasonable to proceed with stress test tomorrow morning predominantly in light of premature history of sudden cardiac death in the family although this may very likely be nonischemic chest discomfort.  If stress test is negative, can be discharged later tomorrow.  Considered option for cardiac catheterization as well.  In my opinion would be reasonable not to proceed with high risk/invasive procedure and stress test is a reasonable alternative.      Tushar Vaz MD    [facility] [place]  [facility]               Reason for initial visit:  chest pain     HPI:    Ms. Aguayo is a 73 yo female who presented to [facility] ER with chief complaint of chest pain with associated nausea and SOB.   She specifically reports the pain is under her left breast and is reproducible with palpitation.   Rated 10/10, last hrs, unclear improving or provoking factors.    She stated CP resolved while in ER and reoccurred several hrs later and was severe in nature.  She has not had any further CP overnight.   She has ambulated to bathroom without CP/SOB.      PMH  acetaminophen (TYLENOL) tablet 650 mg, 650 mg, Oral, Q6H PRN **OR** acetaminophen (TYLENOL) suppository 650 mg, 650 mg, Rectal, Q6H PRN, Hua Esteban MD    morphine (PF) injection 2 mg, 2 mg, IntraVENous, Q4H PRN, Hua Esteban MD Kathryn Johnston, APRN - CNP    [facility] Cardiology  [place] center: (P) [phone]  (F) [phone]

## 2024-11-25 NOTE — PROGRESS NOTES
Attempted to deliver and verbally explain the MOONwith patient. Patient was with clinical staff. Mima Jimenez, Care Management Assistant

## 2024-11-26 ENCOUNTER — APPOINTMENT (OUTPATIENT)
Facility: HOSPITAL | Age: 74
DRG: 206 | End: 2024-11-26
Payer: MEDICARE

## 2024-11-26 VITALS
OXYGEN SATURATION: 100 % | WEIGHT: 140 LBS | RESPIRATION RATE: 24 BRPM | HEIGHT: 62 IN | HEART RATE: 78 BPM | DIASTOLIC BLOOD PRESSURE: 79 MMHG | SYSTOLIC BLOOD PRESSURE: 200 MMHG | TEMPERATURE: 97.4 F | BODY MASS INDEX: 25.76 KG/M2

## 2024-11-26 PROBLEM — R07.89 ATYPICAL CHEST PAIN: Status: ACTIVE | Noted: 2024-11-26

## 2024-11-26 LAB
25(OH)D3 SERPL-MCNC: 12.2 NG/ML (ref 30–100)
ANION GAP SERPL CALC-SCNC: 6 MMOL/L (ref 2–12)
BUN SERPL-MCNC: 18 MG/DL (ref 6–20)
BUN/CREAT SERPL: 21 (ref 12–20)
CALCIUM SERPL-MCNC: 8.9 MG/DL (ref 8.5–10.1)
CHLORIDE SERPL-SCNC: 111 MMOL/L (ref 97–108)
CHOLEST SERPL-MCNC: 178 MG/DL
CO2 SERPL-SCNC: 23 MMOL/L (ref 21–32)
CREAT SERPL-MCNC: 0.84 MG/DL (ref 0.55–1.02)
ECHO BSA: 1.68 M2
EKG DIAGNOSIS: NORMAL
ERYTHROCYTE [DISTWIDTH] IN BLOOD BY AUTOMATED COUNT: 12.5 % (ref 11.5–14.5)
FERRITIN SERPL-MCNC: 39 NG/ML (ref 8–252)
FOLATE SERPL-MCNC: 14.1 NG/ML (ref 5–21)
GLUCOSE SERPL-MCNC: 128 MG/DL (ref 65–100)
HCT VFR BLD AUTO: 28.8 % (ref 35–47)
HDLC SERPL-MCNC: 47 MG/DL
HDLC SERPL: 3.8 (ref 0–5)
HGB BLD-MCNC: 9.6 G/DL (ref 11.5–16)
IRON SATN MFR SERPL: 7 % (ref 20–50)
IRON SERPL-MCNC: 26 UG/DL (ref 35–150)
LDLC SERPL CALC-MCNC: 102.8 MG/DL (ref 0–100)
MAGNESIUM SERPL-MCNC: 2.4 MG/DL (ref 1.6–2.4)
MCH RBC QN AUTO: 30.4 PG (ref 26–34)
MCHC RBC AUTO-ENTMCNC: 33.3 G/DL (ref 30–36.5)
MCV RBC AUTO: 91.1 FL (ref 80–99)
NRBC # BLD: 0 K/UL (ref 0–0.01)
NRBC BLD-RTO: 0 PER 100 WBC
PHOSPHATE SERPL-MCNC: 3.3 MG/DL (ref 2.6–4.7)
PLATELET # BLD AUTO: 306 K/UL (ref 150–400)
PMV BLD AUTO: 9.6 FL (ref 8.9–12.9)
POTASSIUM SERPL-SCNC: 4.1 MMOL/L (ref 3.5–5.1)
RBC # BLD AUTO: 3.16 M/UL (ref 3.8–5.2)
SODIUM SERPL-SCNC: 140 MMOL/L (ref 136–145)
STRESS BASELINE DIAS BP: 84 MMHG
STRESS BASELINE HR: 70 BPM
STRESS BASELINE SYS BP: 152 MMHG
STRESS ESTIMATED WORKLOAD: 1 METS
STRESS PEAK DIAS BP: 82 MMHG
STRESS PEAK SYS BP: 180 MMHG
STRESS PERCENT HR ACHIEVED: 67 %
STRESS POST PEAK HR: 97 BPM
STRESS RATE PRESSURE PRODUCT: NORMAL BPM*MMHG
STRESS TARGET HR: 145 BPM
TIBC SERPL-MCNC: 352 UG/DL (ref 250–450)
TRIGL SERPL-MCNC: 141 MG/DL
VIT B12 SERPL-MCNC: 285 PG/ML (ref 193–986)
VLDLC SERPL CALC-MCNC: 28.2 MG/DL
WBC # BLD AUTO: 7.5 K/UL (ref 3.6–11)

## 2024-11-26 PROCEDURE — A9500 TC99M SESTAMIBI: HCPCS | Performed by: NURSE PRACTITIONER

## 2024-11-26 PROCEDURE — 36415 COLL VENOUS BLD VENIPUNCTURE: CPT

## 2024-11-26 PROCEDURE — 84100 ASSAY OF PHOSPHORUS: CPT

## 2024-11-26 PROCEDURE — G0378 HOSPITAL OBSERVATION PER HR: HCPCS

## 2024-11-26 PROCEDURE — 83735 ASSAY OF MAGNESIUM: CPT

## 2024-11-26 PROCEDURE — 78452 HT MUSCLE IMAGE SPECT MULT: CPT

## 2024-11-26 PROCEDURE — 82306 VITAMIN D 25 HYDROXY: CPT

## 2024-11-26 PROCEDURE — 80061 LIPID PANEL: CPT

## 2024-11-26 PROCEDURE — 93017 CV STRESS TEST TRACING ONLY: CPT

## 2024-11-26 PROCEDURE — 83550 IRON BINDING TEST: CPT

## 2024-11-26 PROCEDURE — 83540 ASSAY OF IRON: CPT

## 2024-11-26 PROCEDURE — 93016 CV STRESS TEST SUPVJ ONLY: CPT | Performed by: SPECIALIST

## 2024-11-26 PROCEDURE — 85027 COMPLETE CBC AUTOMATED: CPT

## 2024-11-26 PROCEDURE — 93018 CV STRESS TEST I&R ONLY: CPT | Performed by: SPECIALIST

## 2024-11-26 PROCEDURE — 82728 ASSAY OF FERRITIN: CPT

## 2024-11-26 PROCEDURE — 82607 VITAMIN B-12: CPT

## 2024-11-26 PROCEDURE — 6360000002 HC RX W HCPCS: Performed by: SPECIALIST

## 2024-11-26 PROCEDURE — 3430000000 HC RX DIAGNOSTIC RADIOPHARMACEUTICAL: Performed by: NURSE PRACTITIONER

## 2024-11-26 PROCEDURE — 1100000003 HC PRIVATE W/ TELEMETRY

## 2024-11-26 PROCEDURE — 99233 SBSQ HOSP IP/OBS HIGH 50: CPT | Performed by: INTERNAL MEDICINE

## 2024-11-26 PROCEDURE — 80048 BASIC METABOLIC PNL TOTAL CA: CPT

## 2024-11-26 PROCEDURE — 82746 ASSAY OF FOLIC ACID SERUM: CPT

## 2024-11-26 PROCEDURE — 6370000000 HC RX 637 (ALT 250 FOR IP): Performed by: INTERNAL MEDICINE

## 2024-11-26 RX ORDER — TETRAKIS(2-METHOXYISOBUTYLISOCYANIDE)COPPER(I) TETRAFLUOROBORATE 1 MG/ML
32.9 INJECTION, POWDER, LYOPHILIZED, FOR SOLUTION INTRAVENOUS
Status: COMPLETED | OUTPATIENT
Start: 2024-11-26 | End: 2024-11-26

## 2024-11-26 RX ORDER — TETRAKIS(2-METHOXYISOBUTYLISOCYANIDE)COPPER(I) TETRAFLUOROBORATE 1 MG/ML
11 INJECTION, POWDER, LYOPHILIZED, FOR SOLUTION INTRAVENOUS
Status: COMPLETED | OUTPATIENT
Start: 2024-11-26 | End: 2024-11-26

## 2024-11-26 RX ORDER — ALPRAZOLAM 0.25 MG/1
0.25 TABLET ORAL 3 TIMES DAILY PRN
Status: DISCONTINUED | OUTPATIENT
Start: 2024-11-26 | End: 2024-11-26 | Stop reason: HOSPADM

## 2024-11-26 RX ORDER — REGADENOSON 0.08 MG/ML
0.4 INJECTION, SOLUTION INTRAVENOUS
Status: COMPLETED | OUTPATIENT
Start: 2024-11-26 | End: 2024-11-26

## 2024-11-26 RX ORDER — ALPRAZOLAM 0.25 MG/1
0.25 TABLET ORAL 3 TIMES DAILY PRN
Qty: 30 TABLET | Refills: 0 | Status: ON HOLD | OUTPATIENT
Start: 2024-11-26 | End: 2024-12-26

## 2024-11-26 RX ADMIN — ALPRAZOLAM 0.25 MG: 0.25 TABLET ORAL at 11:21

## 2024-11-26 RX ADMIN — TETRAKIS(2-METHOXYISOBUTYLISOCYANIDE)COPPER(I) TETRAFLUOROBORATE 11 MILLICURIE: 1 INJECTION, POWDER, LYOPHILIZED, FOR SOLUTION INTRAVENOUS at 07:20

## 2024-11-26 RX ADMIN — TETRAKIS(2-METHOXYISOBUTYLISOCYANIDE)COPPER(I) TETRAFLUOROBORATE 32.9 MILLICURIE: 1 INJECTION, POWDER, LYOPHILIZED, FOR SOLUTION INTRAVENOUS at 09:35

## 2024-11-26 RX ADMIN — REGADENOSON 0.4 MG: 0.08 INJECTION, SOLUTION INTRAVENOUS at 09:40

## 2024-11-26 ASSESSMENT — PAIN SCALES - GENERAL
PAINLEVEL_OUTOF10: 0

## 2024-11-26 NOTE — CONSULTS
[facility] CARDIOLOGY  Cardiology Care Note                  [x]Initial visit     []Established visit     Patient Name: Christin Aguayo - :1950 - MRN:[id number]  Primary Cardiologist: None  Consulting Cardiologist: Tushar Vaz MD       Assessment/Plan/Discussion: Cardiology Attending:     ASSESSMENT  1.  Chest pain atypical ideology  2.  No evidence of interval elevation  3.  Hypertension  4.  Hyperlipidemia  5.  Former tobacco use  6.  History of premature sudden cardiac death in father uncertain if it was an MI      PLAN Christin Aguayo is a very for above-mentioned symptoms.  She does have minor nonspecific ST-T changes on her EKG but no serial troponin elevation or significant delta.  She underwent an echocardiogram which showed normal LV function.  Due to strong family history, decided to proceed with a stress test.  Stress test demonstrates normal LV function with no evidence of any abnormal perfusion.  This is consistent with a normal stress test.  Patient does not require any further evaluation for coronary artery disease and he can be discharged from a cardiac standpoint.  EKG changes may be secondary to hyperdynamic ventricle.    Tushar Vaz MD    [facility] Heart & Vascular [place]  [facility]               Reason for initial visit:  chest pain     HPI:    Ms. Aguayo is a 75 yo female who presented to [facility] ER with chief complaint of chest pain with associated nausea and SOB.   She specifically reports the pain is under her left breast and is reproducible with palpitation.   Rated 10/10, last hrs, unclear improving or provoking factors.    She stated CP resolved while in ER and reoccurred several hrs later and was severe in nature.  She has not had any further CP overnight.   She has ambulated to bathroom without CP/SOB.      PMH includes HTN, HLD, migraines, former tobacco use (>40

## 2024-11-26 NOTE — DISCHARGE SUMMARY
Discharge Summary       PATIENT ID: Christin Aguayo  MRN: [id number]   YOB: 1950    DATE OF ADMISSION: 11/24/2024 11:43 AM    DATE OF DISCHARGE: 11/26/2024  PRIMARY CARE PROVIDER: Denys Alejandro MD     DISCHARGING PROVIDER: Teresita Pichardo MD    To contact this individual call [phone] and ask the  to page.  If unavailable ask to be transferred the Adult Hospitalist Department.    CONSULTATIONS: IP CONSULT TO CARDIOLOGY    PROCEDURES/SURGERIES: * No surgery found *     ADMITTING DIAGNOSES & HOSPITAL COURSE:   HPI:    HOSPITAL  70OURSE:          DISCHARGE DIAGNOSES / PLAN:       ***       PENDING TEST RESULTS:   At the time of discharge the following test results are still pending: ***    FOLLOW UP APPOINTMENTS:    Follow-up Information       Follow up With Specialties Details Why Contact Info    Denys Alejandro MD Internal Medicine Schedule an appointment as soon as possible for a visit in 1 week(s) For blood pressure check; [address] [place], [place] [id number] [phone]      Denys Alejandro MD Internal Medicine   [address] [place], [place] [id number] [phone]                ADDITIONAL CARE RECOMMENDATIONS: ***    DIET: {diet:74771}  Oral Nutritional Supplements: {NUTRITION SUPPLEMENTS:964543}{Frequencies; times a day:92708}    ACTIVITY: {discharge activity:02768}    WOUND CARE: ***    EQUIPMENT needed: ***      DISCHARGE MEDICATIONS:     Medication List        START taking these medications      ALPRAZolam 0.25 MG tablet  Commonly known as: XANAX  Take 1 tablet by mouth 3 times daily as needed for Anxiety for up to 30 days. Do not drive after taking Alprazolam; Max Daily Amount: 0.75 mg            CONTINUE taking these medications      amLODIPine 5 MG tablet  Commonly known as: NORVASC     Rosuvastatin Calcium 20 MG Cpsp            STOP taking these medications      butalbital-acetaminophen-caffeine -40 MG per tablet  Commonly known as: FIORICET,

## 2024-11-26 NOTE — DISCHARGE INSTRUCTIONS
Discharge Instructions       PATIENT ID: Christin Aguayo  MRN: [id number]   YOB: 1950    DATE OF ADMISSION: 11/24/2024   DATE OF DISCHARGE: 11/26/2024    PRIMARY CARE PROVIDER: Denys Alejandro     ATTENDING PHYSICIAN: Teresita Pichardo MD   DISCHARGING PROVIDER: Teresita Pichardo MD    To contact this individual call [phone] and ask the  to page.   If unavailable ask to be transferred the Adult Hospitalist Department.    DISCHARGE DIAGNOSES   Atypical chest pain    CONSULTATIONS:   Cardiology    PROCEDURES/SURGERIES: * No surgery found *    PENDING TEST RESULTS:   At the time of discharge the following test results are still pending: None    FOLLOW UP APPOINTMENTS:   PCP in 1 to 2 weeks for blood pressure check, medications adjustments if indicated, and general medical condition;    ADDITIONAL CARE RECOMMENDATIONS:   Please take all your medications as prescribed;    DIET: regular diet    ACTIVITY: activity as tolerated    WOUND CARE: N/A    EQUIPMENT needed: None      DISCHARGE MEDICATIONS:   See Medication Reconciliation Form    It is important that you take the medication exactly as they are prescribed.   Keep your medication in the bottles provided by the pharmacist and keep a list of the medication names, dosages, and times to be taken in your wallet.   Do not take other medications without consulting your doctor.       NOTIFY YOUR PHYSICIAN FOR ANY OF THE FOLLOWING:   Fever over 101 degrees for 24 hours.   Chest pain, shortness of breath, fever, chills, nausea, vomiting, diarrhea, change in mentation, falling, weakness, bleeding. Severe pain or pain not relieved by medications.  Or, any other signs or symptoms that you may have questions about.      DISPOSITION:   x Home With:   OT  PT  HH  RN       SNF/Inpatient Rehab/LTAC    Independent/assisted living    Hospice    Other:     CDMP Checked:   Yes IK     PROBLEM LIST Updated:  Yes IK       Signed:   Teresita Pichardo

## 2024-11-26 NOTE — PROGRESS NOTES
[facility]                                                                                          Hospitalist Progress Note  Teresita Pichardo MD  Answering service: [phone] OR [phone] from in house phone        Date of Service:  2024  NAME:  Christin Aguayo  :  1950  MRN:  [id number]       Admission Summary:   Christin Aguayo is a 74 y.o. female with hx of htn, dyslipidemia, migraines, chris, hx of tobacco use who presented to ed with complaints of chest pain.  Had been in her usual state of health until earlier this morning when she noted severe, 10/10 sharp pain underneath her left breast.  Has since improved while in the ed.     The patient denies any fever, chills, chest or abdominal pain, nausea, vomiting, cough, congestion, recent illness, palpitations, or dysuria.  Remarkable vitals on ER Presentation: vss  Labs Remarkable for: k-3.3  ER Images: cxr: no acute process  ER Rx: morphine, tylenol, zofran, heparin gtt       Interval history / Subjective:   Patient seen and examined.  The chest pain is gone.  Stress test ordered by cardiology as patient has risk factors for CAD.     Assessment & Plan:           Atypical chest pain:  -reproducible chest pain on exam consistent with costochondritis  -Heparin drip discontinued  -nitro and morphine prn  -tsh, lipid panel and A1c in am  -serial trops  -monitor on tele  -stress test in a.m.  -Appreciate cardiology consult and recommendations;     HTN  -cont pta norvasc 5mg     Dyslipidemia  -cont pta crestor 20mg     Migraine Headaches  -cont pta fioricet prn     CHRIS  -pta valium prn        Code status: Full code  Prophylaxis: Lovenox  Care Plan discussed with: Patient, RN  Anticipated Disposition: TBD, home tomorrow if stress test negative and cleared by cardiology;  Observation  Cardiac monitoring: Telemetry     Principal Problem:    Unstable angina (HCC)  Resolved Problems:    * No resolved hospital problems.

## 2024-11-26 NOTE — PLAN OF CARE
1310: I have reviewed discharge instruction with Patient. Time for questions was allowed and the Patient verbalized understanding. The following were sent with the patient upon discharge.    Red Cardiac Surgery Bracelet: N/A  Valve Card: N/A  Antibiotic Card (for Valves): N/A  Stent Card: N/A  Pacemaker home transmitter: N/A  Signed Prescriptions (controlled substances and/or no pharmacy on record): N/A  DME: no      Care Plan:   Problem: Discharge Planning  Goal: Discharge to home or other facility with appropriate resources  11/26/2024 0937 by Margarita Reyes RN  Outcome: Progressing  11/25/2024 2023 by Lia Delgado RN  Outcome: Progressing  Flowsheets (Taken 11/25/2024 2000)  Discharge to home or other facility with appropriate resources: Identify barriers to discharge with patient and caregiver     Problem: Pain  Goal: Verbalizes/displays adequate comfort level or baseline comfort level  11/26/2024 0937 by Margarita Reyes RN  Outcome: Progressing  Flowsheets  Taken 11/26/2024 0701 by Lia Delgado RN  Verbalizes/displays adequate comfort level or baseline comfort level: Encourage patient to monitor pain and request assistance  Taken 11/26/2024 0400 by Lia Delgado RN  Verbalizes/displays adequate comfort level or baseline comfort level: Encourage patient to monitor pain and request assistance  Taken 11/26/2024 0000 by Lia Delgado RN  Verbalizes/displays adequate comfort level or baseline comfort level: Assess pain using appropriate pain scale  11/25/2024 2023 by Lia Delgado RN  Outcome: Progressing  Flowsheets (Taken 11/25/2024 2000)  Verbalizes/displays adequate comfort level or baseline comfort level: Encourage patient to monitor pain and request assistance     Problem: Safety - Adult  Goal: Free from fall injury  11/26/2024 0937 by Margarita Reyes RN  Outcome: Progressing  11/25/2024 2023 by Lia Delgado RN  Outcome: Progressing

## 2024-11-26 NOTE — PROGRESS NOTES
2000 Received report from Ann Marie rodriguez [facility] care.  0330 Labs drawn.  0730 Bedside and Verbal shift change report given to Giana (oncoming nurse) by Lia (offgoing nurse). Report included the following information Nurse Handoff Report, Adult Overview, Intake/Output, MAR, and Recent Results.

## 2024-11-30 NOTE — PROGRESS NOTES
Physician Progress Note      PATIENT:               BETTIE SUMMERS  [facility] #:                  [id number]  :                       1950  ADMIT DATE:       2024 11:43 AM  DISCH DATE:        2024 3:08 PM  RESPONDING  PROVIDER #:        Teresita Pichardo MD          QUERY TEXT:    Pt admitted with chest pain.  Pt noted to have normal stress test with   reproducible chest pain on exam consistent with costochondritis per attending.   If possible, please document in progress notes and discharge summary if you   are evaluating and/or treating any of the following:    The medical record reflects the following:  Risk Factors: PMH includes HTN, HLD, migraines, former tobacco use    Clinical Indicators:  Card: She does have minor nonspecific ST-T changes   on her EKG but no serial troponin elevation or significant delta.  Echocardiac   does not show any wall motion abnormalities.  Reasonable to proceed with   stress test tomorrow morning predominantly in light of premature history of   sudden cardiac death in the family although this may very likely be   nonischemic chest discomfort.     Med: Atypical chest pain:  -reproducible chest pain on exam consistent with costochondritis  -Heparin drip discontinued.     Card:  Stress test demonstrates normal LV function with no evidence of   any abnormal perfusion.  This is consistent with a normal stress test.    Patient does not require any further evaluation for coronary artery disease   and he can be discharged from a cardiac standpoint.  EKG changes may be   secondary to hyperdynamic ventricle.    Treatment: Stress Test, EKG, Heparin gtt    Thank you,  Niecy Conner RN, CDI  [email or web address]  >  Options provided:  -- Chest pain due to costochondritis  -- Chest pain due to, Please specify etiology  -- Other - I will add my own diagnosis  -- Disagree - Not applicable / Not valid  -- Disagree - Clinically unable to determine / Unknown  -- Refer to

## 2024-12-06 ENCOUNTER — APPOINTMENT (OUTPATIENT)
Facility: HOSPITAL | Age: 74
DRG: 392 | End: 2024-12-06
Payer: MEDICARE

## 2024-12-06 ENCOUNTER — HOSPITAL ENCOUNTER (INPATIENT)
Facility: HOSPITAL | Age: 74
LOS: 3 days | Discharge: HOME | DRG: 392 | End: 2024-12-09
Attending: STUDENT IN AN ORGANIZED HEALTH CARE EDUCATION/TRAINING PROGRAM | Admitting: INTERNAL MEDICINE
Payer: MEDICARE

## 2024-12-06 DIAGNOSIS — K26.9 DUODENAL ULCER: ICD-10-CM

## 2024-12-06 DIAGNOSIS — R11.2 NAUSEA AND VOMITING, UNSPECIFIED VOMITING TYPE: ICD-10-CM

## 2024-12-06 DIAGNOSIS — R10.13 ABDOMINAL PAIN, EPIGASTRIC: Primary | ICD-10-CM

## 2024-12-06 DIAGNOSIS — K57.32 DIVERTICULITIS OF COLON: ICD-10-CM

## 2024-12-06 PROBLEM — K57.92 ACUTE DIVERTICULITIS: Status: ACTIVE | Noted: 2024-12-06

## 2024-12-06 LAB
ALBUMIN SERPL-MCNC: 3.8 G/DL (ref 3.5–5)
ALBUMIN/GLOB SERPL: 1 (ref 1.1–2.2)
ALP SERPL-CCNC: 88 U/L (ref 45–117)
ALT SERPL-CCNC: 26 U/L (ref 12–78)
ANION GAP SERPL CALC-SCNC: 14 MMOL/L (ref 2–12)
AST SERPL-CCNC: 29 U/L (ref 15–37)
BASOPHILS # BLD: 0 K/UL (ref 0–0.1)
BASOPHILS NFR BLD: 0 % (ref 0–1)
BILIRUB SERPL-MCNC: 0.4 MG/DL (ref 0.2–1)
BUN SERPL-MCNC: 20 MG/DL (ref 6–20)
BUN/CREAT SERPL: 22 (ref 12–20)
CALCIUM SERPL-MCNC: 9.6 MG/DL (ref 8.5–10.1)
CHLORIDE SERPL-SCNC: 97 MMOL/L (ref 97–108)
CO2 SERPL-SCNC: 25 MMOL/L (ref 21–32)
COMMENT:: NORMAL
CREAT SERPL-MCNC: 0.91 MG/DL (ref 0.55–1.02)
DIFFERENTIAL METHOD BLD: ABNORMAL
EOSINOPHIL # BLD: 0.1 K/UL (ref 0–0.4)
EOSINOPHIL NFR BLD: 1 % (ref 0–7)
ERYTHROCYTE [DISTWIDTH] IN BLOOD BY AUTOMATED COUNT: 12.1 % (ref 11.5–14.5)
GLOBULIN SER CALC-MCNC: 3.8 G/DL (ref 2–4)
GLUCOSE SERPL-MCNC: 113 MG/DL (ref 65–100)
HCT VFR BLD AUTO: 32.1 % (ref 35–47)
HGB BLD-MCNC: 10.8 G/DL (ref 11.5–16)
IMM GRANULOCYTES # BLD AUTO: 0 K/UL (ref 0–0.04)
IMM GRANULOCYTES NFR BLD AUTO: 0 % (ref 0–0.5)
LIPASE SERPL-CCNC: 22 U/L (ref 13–75)
LYMPHOCYTES # BLD: 1.5 K/UL (ref 0.8–3.5)
LYMPHOCYTES NFR BLD: 14 % (ref 12–49)
MCH RBC QN AUTO: 29.5 PG (ref 26–34)
MCHC RBC AUTO-ENTMCNC: 33.6 G/DL (ref 30–36.5)
MCV RBC AUTO: 87.7 FL (ref 80–99)
MONOCYTES # BLD: 0.8 K/UL (ref 0–1)
MONOCYTES NFR BLD: 7 % (ref 5–13)
NEUTS SEG # BLD: 8.6 K/UL (ref 1.8–8)
NEUTS SEG NFR BLD: 78 % (ref 32–75)
NRBC # BLD: 0 K/UL (ref 0–0.01)
NRBC BLD-RTO: 0 PER 100 WBC
PLATELET # BLD AUTO: 209 K/UL (ref 150–400)
PMV BLD AUTO: 11.3 FL (ref 8.9–12.9)
POTASSIUM SERPL-SCNC: 3 MMOL/L (ref 3.5–5.1)
PROT SERPL-MCNC: 7.6 G/DL (ref 6.4–8.2)
RBC # BLD AUTO: 3.66 M/UL (ref 3.8–5.2)
SODIUM SERPL-SCNC: 136 MMOL/L (ref 136–145)
SPECIMEN HOLD: NORMAL
TROPONIN I SERPL HS-MCNC: 8 NG/L (ref 0–51)
WBC # BLD AUTO: 11.1 K/UL (ref 3.6–11)

## 2024-12-06 PROCEDURE — 6360000002 HC RX W HCPCS: Performed by: STUDENT IN AN ORGANIZED HEALTH CARE EDUCATION/TRAINING PROGRAM

## 2024-12-06 PROCEDURE — 85025 COMPLETE CBC W/AUTO DIFF WBC: CPT

## 2024-12-06 PROCEDURE — 80053 COMPREHEN METABOLIC PANEL: CPT

## 2024-12-06 PROCEDURE — 36415 COLL VENOUS BLD VENIPUNCTURE: CPT

## 2024-12-06 PROCEDURE — 2500000003 HC RX 250 WO HCPCS: Performed by: FAMILY MEDICINE

## 2024-12-06 PROCEDURE — 6360000004 HC RX CONTRAST MEDICATION: Performed by: STUDENT IN AN ORGANIZED HEALTH CARE EDUCATION/TRAINING PROGRAM

## 2024-12-06 PROCEDURE — 1200000000 HC SEMI PRIVATE

## 2024-12-06 PROCEDURE — 96375 TX/PRO/DX INJ NEW DRUG ADDON: CPT

## 2024-12-06 PROCEDURE — 6360000002 HC RX W HCPCS: Performed by: FAMILY MEDICINE

## 2024-12-06 PROCEDURE — 71045 X-RAY EXAM CHEST 1 VIEW: CPT

## 2024-12-06 PROCEDURE — 74177 CT ABD & PELVIS W/CONTRAST: CPT

## 2024-12-06 PROCEDURE — 83690 ASSAY OF LIPASE: CPT

## 2024-12-06 PROCEDURE — 2580000003 HC RX 258: Performed by: STUDENT IN AN ORGANIZED HEALTH CARE EDUCATION/TRAINING PROGRAM

## 2024-12-06 PROCEDURE — 99285 EMERGENCY DEPT VISIT HI MDM: CPT

## 2024-12-06 PROCEDURE — 84484 ASSAY OF TROPONIN QUANT: CPT

## 2024-12-06 PROCEDURE — 96374 THER/PROPH/DIAG INJ IV PUSH: CPT

## 2024-12-06 PROCEDURE — 6370000000 HC RX 637 (ALT 250 FOR IP): Performed by: STUDENT IN AN ORGANIZED HEALTH CARE EDUCATION/TRAINING PROGRAM

## 2024-12-06 PROCEDURE — 6370000000 HC RX 637 (ALT 250 FOR IP): Performed by: FAMILY MEDICINE

## 2024-12-06 PROCEDURE — 74018 RADEX ABDOMEN 1 VIEW: CPT

## 2024-12-06 RX ORDER — POTASSIUM CHLORIDE 750 MG/1
40 TABLET, EXTENDED RELEASE ORAL ONCE
Status: COMPLETED | OUTPATIENT
Start: 2024-12-06 | End: 2024-12-06

## 2024-12-06 RX ORDER — PROCHLORPERAZINE EDISYLATE 5 MG/ML
10 INJECTION INTRAMUSCULAR; INTRAVENOUS ONCE
Status: COMPLETED | OUTPATIENT
Start: 2024-12-06 | End: 2024-12-06

## 2024-12-06 RX ORDER — METRONIDAZOLE 500 MG/100ML
500 INJECTION, SOLUTION INTRAVENOUS EVERY 8 HOURS
Status: DISCONTINUED | OUTPATIENT
Start: 2024-12-06 | End: 2024-12-09 | Stop reason: HOSPADM

## 2024-12-06 RX ORDER — CIPROFLOXACIN 500 MG/1
500 TABLET, FILM COATED ORAL EVERY 12 HOURS SCHEDULED
Status: DISCONTINUED | OUTPATIENT
Start: 2024-12-06 | End: 2024-12-06 | Stop reason: CLARIF

## 2024-12-06 RX ORDER — ROSUVASTATIN CALCIUM 10 MG/1
20 TABLET, COATED ORAL
Status: DISCONTINUED | OUTPATIENT
Start: 2024-12-06 | End: 2024-12-09 | Stop reason: HOSPADM

## 2024-12-06 RX ORDER — MORPHINE SULFATE 2 MG/ML
2 INJECTION, SOLUTION INTRAMUSCULAR; INTRAVENOUS EVERY 4 HOURS PRN
Status: DISCONTINUED | OUTPATIENT
Start: 2024-12-06 | End: 2024-12-09 | Stop reason: HOSPADM

## 2024-12-06 RX ORDER — DEXTROSE MONOHYDRATE, SODIUM CHLORIDE, AND POTASSIUM CHLORIDE 50; 1.49; 9 G/1000ML; G/1000ML; G/1000ML
INJECTION, SOLUTION INTRAVENOUS CONTINUOUS
Status: DISCONTINUED | OUTPATIENT
Start: 2024-12-06 | End: 2024-12-08

## 2024-12-06 RX ORDER — ONDANSETRON 2 MG/ML
4 INJECTION INTRAMUSCULAR; INTRAVENOUS EVERY 6 HOURS PRN
Status: DISCONTINUED | OUTPATIENT
Start: 2024-12-06 | End: 2024-12-09 | Stop reason: HOSPADM

## 2024-12-06 RX ORDER — LEVOFLOXACIN 500 MG/1
750 TABLET, FILM COATED ORAL
Status: DISCONTINUED | OUTPATIENT
Start: 2024-12-06 | End: 2024-12-08

## 2024-12-06 RX ORDER — AMLODIPINE BESYLATE 5 MG/1
5 TABLET ORAL
Status: DISCONTINUED | OUTPATIENT
Start: 2024-12-06 | End: 2024-12-09 | Stop reason: HOSPADM

## 2024-12-06 RX ORDER — POTASSIUM CHLORIDE 7.45 MG/ML
10 INJECTION INTRAVENOUS ONCE
Status: COMPLETED | OUTPATIENT
Start: 2024-12-06 | End: 2024-12-06

## 2024-12-06 RX ORDER — NALOXONE HYDROCHLORIDE 0.4 MG/ML
0.4 INJECTION, SOLUTION INTRAMUSCULAR; INTRAVENOUS; SUBCUTANEOUS PRN
Status: DISCONTINUED | OUTPATIENT
Start: 2024-12-06 | End: 2024-12-09 | Stop reason: HOSPADM

## 2024-12-06 RX ORDER — ALPRAZOLAM 0.5 MG
0.25 TABLET ORAL 3 TIMES DAILY PRN
Status: DISCONTINUED | OUTPATIENT
Start: 2024-12-06 | End: 2024-12-09 | Stop reason: HOSPADM

## 2024-12-06 RX ORDER — IOPAMIDOL 755 MG/ML
100 INJECTION, SOLUTION INTRAVASCULAR
Status: COMPLETED | OUTPATIENT
Start: 2024-12-06 | End: 2024-12-06

## 2024-12-06 RX ADMIN — PANTOPRAZOLE SODIUM 40 MG: 40 INJECTION, POWDER, LYOPHILIZED, FOR SOLUTION INTRAVENOUS at 16:31

## 2024-12-06 RX ADMIN — POTASSIUM CHLORIDE, DEXTROSE MONOHYDRATE AND SODIUM CHLORIDE: 150; 5; 900 INJECTION, SOLUTION INTRAVENOUS at 23:25

## 2024-12-06 RX ADMIN — ONDANSETRON 4 MG: 2 INJECTION INTRAMUSCULAR; INTRAVENOUS at 11:17

## 2024-12-06 RX ADMIN — POTASSIUM CHLORIDE 40 MEQ: 750 TABLET, EXTENDED RELEASE ORAL at 15:19

## 2024-12-06 RX ADMIN — MORPHINE SULFATE 2 MG: 2 INJECTION, SOLUTION INTRAMUSCULAR; INTRAVENOUS at 23:22

## 2024-12-06 RX ADMIN — METRONIDAZOLE 500 MG: 500 INJECTION, SOLUTION INTRAVENOUS at 15:24

## 2024-12-06 RX ADMIN — IOPAMIDOL 100 ML: 755 INJECTION, SOLUTION INTRAVENOUS at 13:24

## 2024-12-06 RX ADMIN — ROSUVASTATIN 20 MG: 10 TABLET, FILM COATED ORAL at 23:31

## 2024-12-06 RX ADMIN — METRONIDAZOLE 500 MG: 500 INJECTION, SOLUTION INTRAVENOUS at 22:43

## 2024-12-06 RX ADMIN — AMLODIPINE BESYLATE 5 MG: 5 TABLET ORAL at 23:32

## 2024-12-06 RX ADMIN — PROCHLORPERAZINE EDISYLATE 10 MG: 5 INJECTION INTRAMUSCULAR; INTRAVENOUS at 13:56

## 2024-12-06 RX ADMIN — POTASSIUM CHLORIDE 10 MEQ: 7.45 INJECTION INTRAVENOUS at 15:32

## 2024-12-06 RX ADMIN — LEVOFLOXACIN 750 MG: 500 TABLET, FILM COATED ORAL at 15:19

## 2024-12-06 ASSESSMENT — PAIN DESCRIPTION - DESCRIPTORS: DESCRIPTORS: SHARP

## 2024-12-06 ASSESSMENT — PAIN SCALES - GENERAL
PAINLEVEL_OUTOF10: 10
PAINLEVEL_OUTOF10: 3
PAINLEVEL_OUTOF10: 7
PAINLEVEL_OUTOF10: 2

## 2024-12-06 ASSESSMENT — PAIN DESCRIPTION - LOCATION: LOCATION: RIB CAGE

## 2024-12-06 ASSESSMENT — PAIN DESCRIPTION - ORIENTATION: ORIENTATION: LEFT

## 2024-12-06 ASSESSMENT — HEART SCORE: ECG: NORMAL

## 2024-12-06 NOTE — PROGRESS NOTES
Day #1 of Levaquin  Indication:  diverticulitis   Current regimen:  Cipro 500 mg bid    Recent Labs     24  1101   WBC 11.1*   CREATININE 0.91   BUN 20     Est CrCl: 48 ml/min  Temp (24hrs), Av.6 °F (36.4 °C), Min:97.6 °F (36.4 °C), Max:97.6 °F (36.4 °C)    Plan: Change to Levaquin 750 mg q48h for CrCl  20-49 ml/min

## 2024-12-06 NOTE — ED PROVIDER NOTES
Complexity of Data Reviewed  Labs: ordered. Decision-making details documented in ED Course.  Radiology: ordered. Decision-making details documented in ED Course.  ECG/medicine tests: ordered.    Risk  Prescription drug management.  Decision regarding hospitalization.          Perfect Serve Consult for Admission  3:01 PM    ED Room Number: SER01/01  Patient Name and age:  Christin Aguayo 74 y.o.  female  Working Diagnosis:   1. Abdominal pain, epigastric    2. Nausea and vomiting, unspecified vomiting type    3. Diverticulitis of colon    4. Duodenal ulcer        COVID-19 Suspicion: No  Sepsis present:  No  Reassessment needed: No  Code Status:  Full Code  Readmission: No  Isolation Requirements: no  Recommended Level of Care: med/surg  Department: [facility] - [phone]  Consulting Provider:     Other: 74-year-old female presenting for chest pain and vomiting and upper abdominal pain, labs notable for leukocytosis WBC 11.1, hypokalemic potassium 3.0 IV and oral repletion ordered, patient with persistent vomiting and nausea requiring multiple rounds of IV antiemetics, EKG shows no acute ischemic changes, troponin within normal limits, chest x-ray without abnormality, CT abdomen shows duodenal peptic ulcer and severe colonic diverticulosis with diverticulitis.  Given patient's age and nausea vomiting recommending admission for IV fluids, IV protonix, IV potassium, IV antiemetics and antibiotics for diverticulitis    Total critical care time spent exclusive of procedures:  31 minutes.         REASSESSMENT     ED Course as of 12/06/24 1506   Fri Dec 06, 2024   1105 EKG shows normal sinus rhythm, 86 bpm, first-degree AV block, no STEMI [SL]   1155 Sodium: 136 [SL]   1155 Potassium(!): 3.0 [SL]   1155 Creatinine: 0.91 [SL]   1155 WBC(!): 11.1 [SL]   1155 Hemoglobin Quant(!): 10.8 [SL]   1155 Troponin, High Sensitivity: 8 [SL]   1156 Lipase: 22 [SL]   1223 XR CHEST PORTABLE  No acute process on portable chest.

## 2024-12-06 NOTE — ED TRIAGE NOTES
Patient reports pain to the left side of the chest under left breast that started this morning, with nausea Pain us sarp Patient drove herself to ED.

## 2024-12-06 NOTE — ED NOTES
TRANSFER - OUT REPORT:    Verbal report given to TIMBO Zhu on Christin Aguayo  being transferred to 5E room 515 for routine progression of patient care       Report consisted of patient's Situation, Background, Assessment and   Recommendations(SBAR).     Information from the following report(s) Nurse Handoff Report, ED Encounter Summary, Intake/Output, MAR, Recent Results, and Cardiac Rhythm NSR  was reviewed with the receiving nurse.    Alexis Fall Assessment:    Presents to emergency department  because of falls (Syncope, seizure, or loss of consciousness): No  Age > 70: Yes  Altered Mental Status, Intoxication with alcohol or substance confusion (Disorientation, impaired judgment, poor safety awaremess, or inability to follow instructions): No  Impaired Mobility: Ambulates or transfers with assistive devices or assistance; Unable to ambulate or transer.: Yes  Nursing Judgement: Yes          Lines:   Peripheral IV 12/06/24 Left Antecubital (Active)        Opportunity for questions and clarification was provided.      Patient transported with:  Monitor  /67   Pulse 88   Temp 97.5 °F (36.4 °C) (Tympanic)   Resp 18   Ht 1.575 m (5' 2\")   Wt 67.2 kg (148 lb 2.4 oz)   SpO2 96%   BMI 27.10 kg/m²         Report was given to Critical care team, patient left ED in no acute distress, A/O x4.

## 2024-12-07 PROCEDURE — 6370000000 HC RX 637 (ALT 250 FOR IP): Performed by: FAMILY MEDICINE

## 2024-12-07 PROCEDURE — 6370000000 HC RX 637 (ALT 250 FOR IP): Performed by: STUDENT IN AN ORGANIZED HEALTH CARE EDUCATION/TRAINING PROGRAM

## 2024-12-07 PROCEDURE — 2580000003 HC RX 258: Performed by: HOSPITALIST

## 2024-12-07 PROCEDURE — 6360000002 HC RX W HCPCS: Performed by: HOSPITALIST

## 2024-12-07 PROCEDURE — 6360000002 HC RX W HCPCS: Performed by: STUDENT IN AN ORGANIZED HEALTH CARE EDUCATION/TRAINING PROGRAM

## 2024-12-07 PROCEDURE — 2500000003 HC RX 250 WO HCPCS: Performed by: FAMILY MEDICINE

## 2024-12-07 PROCEDURE — 6360000002 HC RX W HCPCS: Performed by: FAMILY MEDICINE

## 2024-12-07 PROCEDURE — 1200000000 HC SEMI PRIVATE

## 2024-12-07 RX ORDER — PREGABALIN 50 MG/1
50 CAPSULE ORAL DAILY
Status: DISCONTINUED | OUTPATIENT
Start: 2024-12-07 | End: 2024-12-08

## 2024-12-07 RX ORDER — DIAZEPAM 2 MG/1
2 TABLET ORAL EVERY 12 HOURS PRN
Status: DISCONTINUED | OUTPATIENT
Start: 2024-12-07 | End: 2024-12-09 | Stop reason: HOSPADM

## 2024-12-07 RX ORDER — DOXEPIN HYDROCHLORIDE 10 MG/1
10 CAPSULE ORAL NIGHTLY
Status: DISCONTINUED | OUTPATIENT
Start: 2024-12-07 | End: 2024-12-08

## 2024-12-07 RX ORDER — PREGABALIN 50 MG/1
50 CAPSULE ORAL ONCE
Status: DISCONTINUED | OUTPATIENT
Start: 2024-12-07 | End: 2024-12-09 | Stop reason: HOSPADM

## 2024-12-07 RX ADMIN — METRONIDAZOLE 500 MG: 500 INJECTION, SOLUTION INTRAVENOUS at 23:06

## 2024-12-07 RX ADMIN — SODIUM CHLORIDE, PRESERVATIVE FREE 40 MG: 5 INJECTION INTRAVENOUS at 20:55

## 2024-12-07 RX ADMIN — METRONIDAZOLE 500 MG: 500 INJECTION, SOLUTION INTRAVENOUS at 14:08

## 2024-12-07 RX ADMIN — ONDANSETRON 4 MG: 2 INJECTION INTRAMUSCULAR; INTRAVENOUS at 04:04

## 2024-12-07 RX ADMIN — METRONIDAZOLE 500 MG: 500 INJECTION, SOLUTION INTRAVENOUS at 06:05

## 2024-12-07 RX ADMIN — LEVOFLOXACIN 750 MG: 500 TABLET, FILM COATED ORAL at 14:05

## 2024-12-07 RX ADMIN — POTASSIUM CHLORIDE, DEXTROSE MONOHYDRATE AND SODIUM CHLORIDE: 150; 5; 900 INJECTION, SOLUTION INTRAVENOUS at 14:07

## 2024-12-07 RX ADMIN — MORPHINE SULFATE 2 MG: 2 INJECTION, SOLUTION INTRAMUSCULAR; INTRAVENOUS at 04:01

## 2024-12-07 RX ADMIN — AMLODIPINE BESYLATE 5 MG: 5 TABLET ORAL at 20:54

## 2024-12-07 RX ADMIN — DOXEPIN HYDROCHLORIDE 10 MG: 10 CAPSULE ORAL at 20:54

## 2024-12-07 RX ADMIN — SODIUM CHLORIDE, PRESERVATIVE FREE 40 MG: 5 INJECTION INTRAVENOUS at 09:10

## 2024-12-07 RX ADMIN — ROSUVASTATIN 20 MG: 10 TABLET, FILM COATED ORAL at 20:55

## 2024-12-07 ASSESSMENT — PAIN DESCRIPTION - LOCATION: LOCATION: HEAD

## 2024-12-07 ASSESSMENT — PAIN SCALES - GENERAL
PAINLEVEL_OUTOF10: 9
PAINLEVEL_OUTOF10: 0

## 2024-12-07 NOTE — H&P
Independent  Ambulatory status/function:   EARLY MOBILITY ASSESNormal#diverticulSMENT: 7-complete independence  ANTICIPATED DISCHARGE: 2-3 days  ANTICIPATED DISPOSITION: Home    Signed By: Sofiya Head MD     December 6, 2024         Please note that this dictation may have been completed with Dragon, the computer voice recognition software.  Quite often unanticipated grammatical, syntax, homophones, and other interpretive errors are inadvertently transcribed by the computer software.  Please disregard these errors.  Please excuse any errors that have escaped final proofreading.

## 2024-12-07 NOTE — CONSULTS
[facility] [address] [address]       GASTROENTEROLOGY CONSULTATION NOTE        NAME:  Christin Aguayo   :   1950   MRN:   [id number]           Consult Date: 2024 2:06 PM      History of Present Illness:  Patient is a 74 y.o. who is seen in consultation at the request of Dr. Crespo for peptic ulcer disease and duodenitis. She has a PMH as below. She presented to ED with left-sided chest pain. She was hospitalized one week ago and had a largely negative cardiac work up. She underwent CT and this showed concern for duodenal ulcer and duodenitis and diverticulosis with diverticulitis (left-sided). She has a history of recurrent diverticulitis (uncomplicated by history). She denies having similar pain to prior episodes. She has been started on PPI, and antibiotics (levofloxacin and metronidazole).    She reports daily NSAID use long term. She reports having nausea earlier this week along with decreased appetite.    3/7/24 Colonoscopy with Dr. Jennifer Abernathy - cecal polyp (hyperplastic) and right and left-sided diverticulosis     PMH:  Past Medical History:   Diagnosis Date    Blood type O-(neg) - issue 10/2014    with anti-M antibodies ([place] had to obtain from out of [place] in the past).    Diverticulitis 8/3/2010    Follow up colonoscopy neg    Diverticulosis 03/10/2017    Fatty liver disease, nonalcoholic 2017    History of esophageal ulcer (age 10)    has not recurred    History of ganglion cyst     left wrist - removed    History of shingles 2016    HTN     Hypercholesterolemia with hypertriglyceridemia     controlled on medication    Hypovitaminosis D     Kidney stone     Migraines     migraines    MVP (mitral valve prolapse)     Smoker 2017       PSH:  Past Surgical History:   Procedure Laterality Date    ACHILLES TENDON SURGERY Left 2023    LEFT FOOT REPAIR OF PERONEAL TENDONS (IV CONSCIOUS SEDATION WITH REGIONAL BLOCK) performed by

## 2024-12-07 NOTE — PROGRESS NOTES
Hospitalist Progress Note  Marychuy Crespo MD  Answering service: [phone]        Date of Service:  2024  NAME:  Christin Aguayo  :  1950  MRN:  [id number]      Admission Summary:   Christin Aguayo is a 74 y.o. female with hx of htn, dyslipidemia, migraines, bryan, hx of tobacco use who presented to ed with complaints of chest pain.  Had been in her usual state of health until earlier this morning when she noted severe, 10/10 sharp pain underneath her left breast.  Has since improved while in the ed.     The patient denies any fever, chills, chest or abdominal pain, nausea, vomiting, cough, congestion, recent illness, palpitations, or dysuria.  Remarkable vitals on ER Presentation: vss  Labs Remarkable for: k-3.3  ER Images: cxr: no acute process  ER Rx: morphine, tylenol, zofran, heprain gtt       Interval history / Subjective:   Epigastric discomfort, pain much better  Denied LLQ pain  No diarrhea   Taking advil recently       CT ABDOMEN PELVIS W IV CONTRAST Additional Contrast? None    Result Date: 2024  Duodenal peptic ulcer associated duodenitis as detailed. No obvious perforation at the moment. Clinical correlation and continued attention on follow-up advised. Severe colonic diverticulosis with associated acute diverticulitis along the descending/sigmoid colonic junction. Continued attention on follow-up advised. Electronically signed by PELON STOREY    XR ABDOMEN (KUB) (SINGLE AP VIEW)    Result Date: 2024  No evidence of acute process. Electronically signed by KOBE AGUIRRE    XR CHEST PORTABLE    Result Date: 2024  No acute process on portable chest. Electronically signed by KOBE AGUIRRE    Assessment & Plan:      Duodenal peptic ulcer/duodenitis  Lower Chest pain likely due to above  -reproducible chest pain on exam consistent with costochondritis  Troponin negative   -GI consulted: npo

## 2024-12-08 ENCOUNTER — ANESTHESIA (OUTPATIENT)
Facility: HOSPITAL | Age: 74
End: 2024-12-08
Payer: MEDICARE

## 2024-12-08 ENCOUNTER — ANESTHESIA EVENT (OUTPATIENT)
Facility: HOSPITAL | Age: 74
End: 2024-12-08
Payer: MEDICARE

## 2024-12-08 LAB
ALBUMIN SERPL-MCNC: 2.8 G/DL (ref 3.5–5)
ALBUMIN/GLOB SERPL: 0.9 (ref 1.1–2.2)
ALP SERPL-CCNC: 73 U/L (ref 45–117)
ALT SERPL-CCNC: 16 U/L (ref 12–78)
ANION GAP SERPL CALC-SCNC: 5 MMOL/L (ref 2–12)
AST SERPL-CCNC: 14 U/L (ref 15–37)
BASOPHILS # BLD: 0 K/UL (ref 0–0.1)
BASOPHILS NFR BLD: 1 % (ref 0–1)
BILIRUB SERPL-MCNC: 0.2 MG/DL (ref 0.2–1)
BUN SERPL-MCNC: 8 MG/DL (ref 6–20)
BUN/CREAT SERPL: 12 (ref 12–20)
CALCIUM SERPL-MCNC: 8.7 MG/DL (ref 8.5–10.1)
CHLORIDE SERPL-SCNC: 111 MMOL/L (ref 97–108)
CO2 SERPL-SCNC: 24 MMOL/L (ref 21–32)
CREAT SERPL-MCNC: 0.65 MG/DL (ref 0.55–1.02)
DIFFERENTIAL METHOD BLD: ABNORMAL
EOSINOPHIL # BLD: 0.2 K/UL (ref 0–0.4)
EOSINOPHIL NFR BLD: 3 % (ref 0–7)
ERYTHROCYTE [DISTWIDTH] IN BLOOD BY AUTOMATED COUNT: 12.2 % (ref 11.5–14.5)
GLOBULIN SER CALC-MCNC: 3 G/DL (ref 2–4)
GLUCOSE SERPL-MCNC: 124 MG/DL (ref 65–100)
HCT VFR BLD AUTO: 27.4 % (ref 35–47)
HGB BLD-MCNC: 9 G/DL (ref 11.5–16)
IMM GRANULOCYTES # BLD AUTO: 0 K/UL (ref 0–0.04)
IMM GRANULOCYTES NFR BLD AUTO: 1 % (ref 0–0.5)
LYMPHOCYTES # BLD: 1 K/UL (ref 0.8–3.5)
LYMPHOCYTES NFR BLD: 20 % (ref 12–49)
MCH RBC QN AUTO: 29.4 PG (ref 26–34)
MCHC RBC AUTO-ENTMCNC: 32.8 G/DL (ref 30–36.5)
MCV RBC AUTO: 89.5 FL (ref 80–99)
MONOCYTES # BLD: 0.6 K/UL (ref 0–1)
MONOCYTES NFR BLD: 11 % (ref 5–13)
NEUTS SEG # BLD: 3.3 K/UL (ref 1.8–8)
NEUTS SEG NFR BLD: 64 % (ref 32–75)
NRBC # BLD: 0 K/UL (ref 0–0.01)
NRBC BLD-RTO: 0 PER 100 WBC
PLATELET # BLD AUTO: 365 K/UL (ref 150–400)
PMV BLD AUTO: 10.3 FL (ref 8.9–12.9)
POTASSIUM SERPL-SCNC: 3.1 MMOL/L (ref 3.5–5.1)
PROT SERPL-MCNC: 5.8 G/DL (ref 6.4–8.2)
RBC # BLD AUTO: 3.06 M/UL (ref 3.8–5.2)
SODIUM SERPL-SCNC: 140 MMOL/L (ref 136–145)
WBC # BLD AUTO: 5.2 K/UL (ref 3.6–11)

## 2024-12-08 PROCEDURE — 7100000000 HC PACU RECOVERY - FIRST 15 MIN: Performed by: INTERNAL MEDICINE

## 2024-12-08 PROCEDURE — 3600000002 HC SURGERY LEVEL 2 BASE: Performed by: INTERNAL MEDICINE

## 2024-12-08 PROCEDURE — 6370000000 HC RX 637 (ALT 250 FOR IP): Performed by: FAMILY MEDICINE

## 2024-12-08 PROCEDURE — 2709999900 HC NON-CHARGEABLE SUPPLY: Performed by: INTERNAL MEDICINE

## 2024-12-08 PROCEDURE — 6360000002 HC RX W HCPCS: Performed by: HOSPITALIST

## 2024-12-08 PROCEDURE — 6370000000 HC RX 637 (ALT 250 FOR IP): Performed by: HOSPITALIST

## 2024-12-08 PROCEDURE — 3700000000 HC ANESTHESIA ATTENDED CARE: Performed by: INTERNAL MEDICINE

## 2024-12-08 PROCEDURE — 6360000002 HC RX W HCPCS: Performed by: NURSE ANESTHETIST, CERTIFIED REGISTERED

## 2024-12-08 PROCEDURE — 2580000003 HC RX 258: Performed by: HOSPITALIST

## 2024-12-08 PROCEDURE — 1200000000 HC SEMI PRIVATE

## 2024-12-08 PROCEDURE — 2500000003 HC RX 250 WO HCPCS: Performed by: FAMILY MEDICINE

## 2024-12-08 PROCEDURE — 0DJ08ZZ INSPECTION OF UPPER INTESTINAL TRACT, VIA NATURAL OR ARTIFICIAL OPENING ENDOSCOPIC: ICD-10-PCS | Performed by: INTERNAL MEDICINE

## 2024-12-08 PROCEDURE — 85025 COMPLETE CBC W/AUTO DIFF WBC: CPT

## 2024-12-08 PROCEDURE — 6360000002 HC RX W HCPCS: Performed by: STUDENT IN AN ORGANIZED HEALTH CARE EDUCATION/TRAINING PROGRAM

## 2024-12-08 PROCEDURE — 80053 COMPREHEN METABOLIC PANEL: CPT

## 2024-12-08 PROCEDURE — 7100000001 HC PACU RECOVERY - ADDTL 15 MIN: Performed by: INTERNAL MEDICINE

## 2024-12-08 RX ORDER — LIDOCAINE HYDROCHLORIDE 20 MG/ML
INJECTION, SOLUTION EPIDURAL; INFILTRATION; INTRACAUDAL; PERINEURAL
Status: DISCONTINUED | OUTPATIENT
Start: 2024-12-08 | End: 2024-12-08 | Stop reason: SDUPTHER

## 2024-12-08 RX ORDER — PROPOFOL 10 MG/ML
INJECTION, EMULSION INTRAVENOUS
Status: DISCONTINUED | OUTPATIENT
Start: 2024-12-08 | End: 2024-12-08 | Stop reason: SDUPTHER

## 2024-12-08 RX ORDER — LEVOFLOXACIN 500 MG/1
750 TABLET, FILM COATED ORAL DAILY
Status: DISCONTINUED | OUTPATIENT
Start: 2024-12-08 | End: 2024-12-09 | Stop reason: HOSPADM

## 2024-12-08 RX ORDER — POTASSIUM CHLORIDE 7.45 MG/ML
10 INJECTION INTRAVENOUS
Status: COMPLETED | OUTPATIENT
Start: 2024-12-08 | End: 2024-12-08

## 2024-12-08 RX ADMIN — PROPOFOL 25 MG: 10 INJECTION, EMULSION INTRAVENOUS at 09:23

## 2024-12-08 RX ADMIN — POTASSIUM CHLORIDE 10 MEQ: 7.46 INJECTION, SOLUTION INTRAVENOUS at 08:48

## 2024-12-08 RX ADMIN — PROPOFOL 25 MG: 10 INJECTION, EMULSION INTRAVENOUS at 09:24

## 2024-12-08 RX ADMIN — SODIUM CHLORIDE, PRESERVATIVE FREE 40 MG: 5 INJECTION INTRAVENOUS at 21:39

## 2024-12-08 RX ADMIN — AMLODIPINE BESYLATE 5 MG: 5 TABLET ORAL at 21:39

## 2024-12-08 RX ADMIN — POTASSIUM CHLORIDE, DEXTROSE MONOHYDRATE AND SODIUM CHLORIDE: 150; 5; 900 INJECTION, SOLUTION INTRAVENOUS at 05:40

## 2024-12-08 RX ADMIN — PROPOFOL 100 MG: 10 INJECTION, EMULSION INTRAVENOUS at 09:22

## 2024-12-08 RX ADMIN — LEVOFLOXACIN 750 MG: 500 TABLET, FILM COATED ORAL at 14:18

## 2024-12-08 RX ADMIN — SODIUM CHLORIDE, PRESERVATIVE FREE 40 MG: 5 INJECTION INTRAVENOUS at 08:45

## 2024-12-08 RX ADMIN — LIDOCAINE HYDROCHLORIDE 50 MG: 20 INJECTION, SOLUTION EPIDURAL; INFILTRATION; INTRACAUDAL; PERINEURAL at 09:22

## 2024-12-08 RX ADMIN — ROSUVASTATIN 20 MG: 10 TABLET, FILM COATED ORAL at 21:39

## 2024-12-08 RX ADMIN — METRONIDAZOLE 500 MG: 500 INJECTION, SOLUTION INTRAVENOUS at 14:17

## 2024-12-08 RX ADMIN — POTASSIUM CHLORIDE 10 MEQ: 7.46 INJECTION, SOLUTION INTRAVENOUS at 10:04

## 2024-12-08 RX ADMIN — METRONIDAZOLE 500 MG: 500 INJECTION, SOLUTION INTRAVENOUS at 23:01

## 2024-12-08 RX ADMIN — METRONIDAZOLE 500 MG: 500 INJECTION, SOLUTION INTRAVENOUS at 06:24

## 2024-12-08 ASSESSMENT — PAIN - FUNCTIONAL ASSESSMENT: PAIN_FUNCTIONAL_ASSESSMENT: ADULT NONVERBAL PAIN SCALE (NPVS)

## 2024-12-08 NOTE — PROGRESS NOTES
Hospitalist Progress Note  Marychuy Crespo MD  Answering service: [phone]        Date of Service:  2024  NAME:  Christin Aguayo  :  1950  MRN:  [id number]      Admission Summary:   Christin Aguayo is a 74 y.o. female with hx of htn, dyslipidemia, migraines, bryan, hx of tobacco use who presented to ed with complaints of chest pain.  Had been in her usual state of health until earlier this morning when she noted severe, 10/10 sharp pain underneath her left breast.  Has since improved while in the ed.     The patient denies any fever, chills, chest or abdominal pain, nausea, vomiting, cough, congestion, recent illness, palpitations, or dysuria.  Remarkable vitals on ER Presentation: vss  Labs Remarkable for: k-3.3  ER Images: cxr: no acute process  ER Rx: morphine, tylenol, zofran, heprain gtt       Interval history / Subjective:   Epigastric discomfort, pain much better  Denied LLQ pain  No diarrhea   Taking advil recently       CT ABDOMEN PELVIS W IV CONTRAST Additional Contrast? None    Result Date: 2024  Duodenal peptic ulcer associated duodenitis as detailed. No obvious perforation at the moment. Clinical correlation and continued attention on follow-up advised. Severe colonic diverticulosis with associated acute diverticulitis along the descending/sigmoid colonic junction. Continued attention on follow-up advised. Electronically signed by PELON STOREY    XR ABDOMEN (KUB) (SINGLE AP VIEW)    Result Date: 2024  No evidence of acute process. Electronically signed by KOBE AGUIRRE    XR CHEST PORTABLE    Result Date: 2024  No acute process on portable chest. Electronically signed by KOBE AGUIRRE    Assessment & Plan:      Duodenal peptic ulcer/duodenitis  Lower Chest pain likely due to above  -reproducible chest pain on exam consistent with costochondritis  Troponin negative   -GI consulted:

## 2024-12-08 NOTE — PROGRESS NOTES
0143: Perfect Serve to NP [place]: Gonzales: Patient received doxepin 10mg at 2054. We went in to check on her because heart monitor was reading 112. Patient was found up in room, states that she was having a nightmare and felt like she was having an out of body experience. Patient states that she does not take this medication at home but takes something similar, which she did not know the name of. States that she finished the bottle given to her and has not refilled it recently. Can we get this discontinued? Patient does not wish to continue taking.     Medication discontinued. Patient placed on bed alarm for safety.

## 2024-12-08 NOTE — PERIOP NOTE
TRANSFER - IN REPORT:    Verbal report received from TIMBO Box  on Christin Aguayo  being received from 515 for ordered procedure      Report consisted of patient's Situation, Background, Assessment and   Recommendations(SBAR).     Information from the following report(s) Nurse Handoff Report was reviewed with the receiving nurse.    Opportunity for questions and clarification was provided.      Assessment completed upon patient's arrival to unit and care assumed.

## 2024-12-08 NOTE — PERIOP NOTE
TRANSFER - OUT REPORT:    Verbal report given to TIMBO Box (name) on Christin Aguayo  being transferred to [facility](unit) for routine post-op       Report consisted of patient’s Situation, Background, Assessment and   Recommendations(SBAR).       Anesthesia Stop time: 0928    Information from the following report(s) Procedure Summary was reviewed with the receiving nurse.    Opportunity for questions and clarification was provided.     Is the patient on 02? No           Is the patient on a monitor? Yes    Is the nurse transporting with the patient? Yes    At transfer, are there Patient Belongings with the patient?  If Yes, please note/list:    Surgical Waiting Area notified of patient's transfer from PACU? No

## 2024-12-08 NOTE — OP NOTE
[facility] [address] [place], [place] [address]          [facility]- Gastroduodenoscopy (EGD) Procedure Note    Christin Aguayo  1950  [id number]      Procedure: Endoscopic Gastroduodenoscopy --diagnostic    Indication: nausea, long term NSAID use and suspected duodenal ulcer on CT    Pre-operative Diagnosis: see indication above    Post-operative Diagnosis: see findings below    : Kashmir Morgan MD    Staff: Circulator: Vik Ramirez RN  Circulator Assist: Annemarie Riojas RN  Endoscopy Technician: Lashon San     Referring Provider:  Denys Alejandro MD      Anesthesia/Sedation: MAC        Procedure Details     After informed consent was obtained for the procedure, with all risks and benefits of procedure explained the patient was taken to the endoscopy suite and placed in the left lateral decubitus position.  Following sequential administration of sedation as per above, the endoscope was inserted into the mouth and advanced under direct vision to second portion of the duodenum. A careful inspection was made as the gastroscope was withdrawn, including a retroflexed view of the proximal stomach; findings and interventions are described below.      Findings:   Esophagus: normal  Stomach: patchy antral erythema - biopsies deferred  Duodenum: in the distal bulb extending into the duodenal sweep there was luminal deformity and stenosis. A deep, cratered ulcer was seen with clean based and likely fibrous food contents within the base. Attempt to traverse this region was deferred. Biopsies were deferred.    Specimens: none         EBL: None      Complications:   None; patient tolerated the procedure well.           Impression:    As above    Recommendations:  Clear liquid diet  Continue PPI BID  Consider upper GI series next to better characterize luminal deformity and stenosis  Avoid NSAID's  Continue antibiotics for diverticulitis  [facility] colleagues to assume care

## 2024-12-09 ENCOUNTER — APPOINTMENT (OUTPATIENT)
Facility: HOSPITAL | Age: 74
DRG: 392 | End: 2024-12-09
Payer: MEDICARE

## 2024-12-09 VITALS
HEART RATE: 87 BPM | BODY MASS INDEX: 27.26 KG/M2 | HEIGHT: 62 IN | OXYGEN SATURATION: 96 % | DIASTOLIC BLOOD PRESSURE: 71 MMHG | SYSTOLIC BLOOD PRESSURE: 135 MMHG | RESPIRATION RATE: 16 BRPM | WEIGHT: 148.15 LBS | TEMPERATURE: 98.1 F

## 2024-12-09 LAB
ALBUMIN SERPL-MCNC: 2.9 G/DL (ref 3.5–5)
ALBUMIN/GLOB SERPL: 1 (ref 1.1–2.2)
ALP SERPL-CCNC: 67 U/L (ref 45–117)
ALT SERPL-CCNC: 16 U/L (ref 12–78)
ANION GAP SERPL CALC-SCNC: 7 MMOL/L (ref 2–12)
AST SERPL-CCNC: 9 U/L (ref 15–37)
BASOPHILS # BLD: 0 K/UL (ref 0–0.1)
BASOPHILS NFR BLD: 1 % (ref 0–1)
BILIRUB SERPL-MCNC: 0.2 MG/DL (ref 0.2–1)
BUN SERPL-MCNC: 5 MG/DL (ref 6–20)
BUN/CREAT SERPL: 7 (ref 12–20)
CALCIUM SERPL-MCNC: 8.8 MG/DL (ref 8.5–10.1)
CHLORIDE SERPL-SCNC: 111 MMOL/L (ref 97–108)
CO2 SERPL-SCNC: 22 MMOL/L (ref 21–32)
CREAT SERPL-MCNC: 0.7 MG/DL (ref 0.55–1.02)
DIFFERENTIAL METHOD BLD: ABNORMAL
EOSINOPHIL # BLD: 0.2 K/UL (ref 0–0.4)
EOSINOPHIL NFR BLD: 3 % (ref 0–7)
ERYTHROCYTE [DISTWIDTH] IN BLOOD BY AUTOMATED COUNT: 12.2 % (ref 11.5–14.5)
GLOBULIN SER CALC-MCNC: 2.9 G/DL (ref 2–4)
GLUCOSE SERPL-MCNC: 114 MG/DL (ref 65–100)
HCT VFR BLD AUTO: 27.5 % (ref 35–47)
HGB BLD-MCNC: 9 G/DL (ref 11.5–16)
IMM GRANULOCYTES # BLD AUTO: 0 K/UL (ref 0–0.04)
IMM GRANULOCYTES NFR BLD AUTO: 1 % (ref 0–0.5)
LYMPHOCYTES # BLD: 1.1 K/UL (ref 0.8–3.5)
LYMPHOCYTES NFR BLD: 22 % (ref 12–49)
MAGNESIUM SERPL-MCNC: 1.6 MG/DL (ref 1.6–2.4)
MCH RBC QN AUTO: 29.5 PG (ref 26–34)
MCHC RBC AUTO-ENTMCNC: 32.7 G/DL (ref 30–36.5)
MCV RBC AUTO: 90.2 FL (ref 80–99)
MONOCYTES # BLD: 0.7 K/UL (ref 0–1)
MONOCYTES NFR BLD: 13 % (ref 5–13)
NEUTS SEG # BLD: 3 K/UL (ref 1.8–8)
NEUTS SEG NFR BLD: 60 % (ref 32–75)
NRBC # BLD: 0 K/UL (ref 0–0.01)
NRBC BLD-RTO: 0 PER 100 WBC
PLATELET # BLD AUTO: 391 K/UL (ref 150–400)
PMV BLD AUTO: 9.8 FL (ref 8.9–12.9)
POTASSIUM SERPL-SCNC: 2.9 MMOL/L (ref 3.5–5.1)
PROT SERPL-MCNC: 5.8 G/DL (ref 6.4–8.2)
RBC # BLD AUTO: 3.05 M/UL (ref 3.8–5.2)
SODIUM SERPL-SCNC: 140 MMOL/L (ref 136–145)
WBC # BLD AUTO: 5 K/UL (ref 3.6–11)

## 2024-12-09 PROCEDURE — 87338 HPYLORI STOOL AG IA: CPT

## 2024-12-09 PROCEDURE — 6360000002 HC RX W HCPCS

## 2024-12-09 PROCEDURE — 6360000002 HC RX W HCPCS: Performed by: STUDENT IN AN ORGANIZED HEALTH CARE EDUCATION/TRAINING PROGRAM

## 2024-12-09 PROCEDURE — 80053 COMPREHEN METABOLIC PANEL: CPT

## 2024-12-09 PROCEDURE — 83735 ASSAY OF MAGNESIUM: CPT

## 2024-12-09 PROCEDURE — 6370000000 HC RX 637 (ALT 250 FOR IP): Performed by: HOSPITALIST

## 2024-12-09 PROCEDURE — 85025 COMPLETE CBC W/AUTO DIFF WBC: CPT

## 2024-12-09 PROCEDURE — 6360000002 HC RX W HCPCS: Performed by: HOSPITALIST

## 2024-12-09 PROCEDURE — 2580000003 HC RX 258: Performed by: HOSPITALIST

## 2024-12-09 RX ORDER — POTASSIUM CHLORIDE 7.45 MG/ML
10 INJECTION INTRAVENOUS
Status: COMPLETED | OUTPATIENT
Start: 2024-12-09 | End: 2024-12-09

## 2024-12-09 RX ORDER — LEVOFLOXACIN 750 MG/1
750 TABLET, FILM COATED ORAL DAILY
Qty: 7 TABLET | Refills: 0 | Status: SHIPPED | OUTPATIENT
Start: 2024-12-10 | End: 2024-12-17

## 2024-12-09 RX ORDER — PANTOPRAZOLE SODIUM 40 MG/1
40 TABLET, DELAYED RELEASE ORAL
Status: DISCONTINUED | OUTPATIENT
Start: 2024-12-09 | End: 2024-12-09 | Stop reason: HOSPADM

## 2024-12-09 RX ORDER — PANTOPRAZOLE SODIUM 40 MG/1
40 TABLET, DELAYED RELEASE ORAL
Qty: 60 TABLET | Refills: 1 | Status: SHIPPED | OUTPATIENT
Start: 2024-12-09 | End: 2025-02-07

## 2024-12-09 RX ORDER — METRONIDAZOLE 500 MG/1
500 TABLET ORAL 2 TIMES DAILY
Qty: 14 TABLET | Refills: 0 | Status: SHIPPED | OUTPATIENT
Start: 2024-12-09 | End: 2024-12-16

## 2024-12-09 RX ADMIN — SODIUM CHLORIDE, PRESERVATIVE FREE 40 MG: 5 INJECTION INTRAVENOUS at 09:51

## 2024-12-09 RX ADMIN — POTASSIUM CHLORIDE 10 MEQ: 10 INJECTION, SOLUTION INTRAVENOUS at 09:57

## 2024-12-09 RX ADMIN — METRONIDAZOLE 500 MG: 500 INJECTION, SOLUTION INTRAVENOUS at 14:26

## 2024-12-09 RX ADMIN — POTASSIUM CHLORIDE 10 MEQ: 10 INJECTION, SOLUTION INTRAVENOUS at 12:55

## 2024-12-09 RX ADMIN — METRONIDAZOLE 500 MG: 500 INJECTION, SOLUTION INTRAVENOUS at 06:51

## 2024-12-09 RX ADMIN — LEVOFLOXACIN 750 MG: 500 TABLET, FILM COATED ORAL at 14:21

## 2024-12-09 RX ADMIN — POTASSIUM CHLORIDE 10 MEQ: 10 INJECTION, SOLUTION INTRAVENOUS at 11:30

## 2024-12-09 NOTE — CARE COORDINATION
12/07/24 1333   Readmission Assessment   Number of Days since last admission? 8-30 days   Previous Disposition Home Alone   Who is being Interviewed Patient   What was the patient's/caregiver's perception as to why they think they needed to return back to the hospital? Other (Comment)  (Patient feels she went back to work and did not rest enough.)   Did you visit your Primary Care Physician after you left the hospital, before you returned this time? Yes   Did you see a specialist, such as Cardiac, Pulmonary, Orthopedic Physician, etc. after you left the hospital? No   Who advised the patient to return to the hospital? Self-referral   Does the patient report anything that got in the way of taking their medications? No   In our efforts to provide the best possible care to you and others like you, can you think of anything that we could have done to help you after you left the hospital the first time, so that you might not have needed to return so soon? Other (Comment)  (Patient feels she returned to work too soon.)     THERESA SAAVEDRA    
Transition of Care Plan:    RUR: 13% low   Prior Level of Functioning: Independent   Disposition: Home   NICOLLE: Tues. 12/10  Follow up appointments: PCP  DME needed: None noted  Transportation at discharge: Friend   IM/IMM Medicare/ letter given: 1st IM: 11/6/24  Is patient a [place] and connected with [facility]? NA  Caregiver Contact:  Ioana Brinkfelix Flores in [place], [phone]   Discharge Caregiver contacted prior to discharge? Upon patient request  Care Conference needed? No  Barriers to discharge: Medical    CM reviewed chart. Per review and ID rounds, patient upgraded to regular diet; will assess tolerance. Anticipated DC within 24 hours pending medical progress and final recommendations. CM will follow as needed.     THERESA Acevedo   [phone]      
  Home Access Stairs to enter with rails   Entrance Stairs - Number of Steps 4   Entrance Stairs - Rails Both   Home Equipment Walker - Rolling  (Pediatric walker. Patient is 5'1\")   Prior Level of Assist for ADLs Independent   Prior Level of Assist for Homemaking Independent   Ambulation Assistance Independent   Prior Level of Assist for Transfers Independent   Active  Yes   Mode of Transportation Car   Occupation Full time employment   Type of Occupation  to  of [employer]   Discharge Planning   Type of Residence House   Living Arrangements Alone   Current Services Prior To Admission None   Potential Assistance Needed N/A   DME Ordered? No   Potential Assistance Purchasing Medications No   Type of Home Care Services None   One/Two Story Residence One story   Services At/After Discharge    Resource Information Provided? No   Mode of Transport at Discharge Other (see comment)  (friend)   Confirm Follow Up Transport Self     CORINE PAUL MSW

## 2024-12-09 NOTE — PROGRESS NOTES
[facility]  [address]  [place] [address] [phone]                     GI PROGRESS NOTE    Patient Name: Christin Aguayo      : 1950      MRN: [id number]  Admit Date: 2024  Today's Date: 2024  CC: duodenal ulcer     Subjective:     Patient denies N/V or abdominal pain. We discussed finding of EGD and her use of NSAIDs.       Objective:     Blood pressure 135/71, pulse 82, temperature 98.1 °F (36.7 °C), temperature source Oral, resp. rate 16, height 1.575 m (5' 2\"), weight 67.2 kg (148 lb 2.4 oz), SpO2 96%.    Physical Exam:  General appearance: cooperative, no distress, appears stated age  Skin: Extremities and face reveal no rashes.    HEENT: Sclerae anicteric.   Cardiovascular: Regular rate and rhythm.  Respiratory: Comfortable breathing with no accessory muscle use.   GI: Abdomen nondistended, soft, and nontender. Normal active bowel sounds.   Rectal: Deferred   Musculoskeletal: No pitting edema of the lower legs.  Neurological:  Patient is alert and oriented.   Psychiatric:  No anxiety or agitation.      Data Review:    Recent Results (from the past 24 hour(s))   CBC with Auto Differential    Collection Time: 24  5:05 AM   Result Value Ref Range    WBC 5.0 3.6 - 11.0 K/uL    RBC 3.05 (L) 3.80 - 5.20 M/uL    Hemoglobin 9.0 (L) 11.5 - 16.0 g/dL    Hematocrit 27.5 (L) 35.0 - 47.0 %    MCV 90.2 80.0 - 99.0 FL    MCH 29.5 26.0 - 34.0 PG    MCHC 32.7 30.0 - 36.5 g/dL    RDW 12.2 11.5 - 14.5 %    Platelets 391 150 - 400 K/uL    MPV 9.8 8.9 - 12.9 FL    Nucleated RBCs 0.0 0  WBC    nRBC 0.00 0.00 - 0.01 K/uL    Neutrophils % 60 32 - 75 %    Lymphocytes % 22 12 - 49 %    Monocytes % 13 5 - 13 %    Eosinophils % 3 0 - 7 %    Basophils % 1 0 - 1 %    Immature Granulocytes % 1 (H) 0.0 - 0.5 %    Neutrophils Absolute 3.0 1.8 - 8.0 K/UL    Lymphocytes Absolute 1.1 0.8 - 3.5 K/UL    Monocytes Absolute 0.7 0.0 - 1.0 K/UL    Eosinophils Absolute 0.2 0.0 -

## 2024-12-09 NOTE — PROGRESS NOTES
12/9/2024        RE: Christin Aguayo         [address]  [place] [address]          To Whom It May Concern,      Due to medical reasons, Christin Aguayo may  may return to full duty immediately with no restrictions on Monday, December 16th, 2024.        Sincerely,          Karen Cartagena PA-C

## 2024-12-11 LAB
H PYLORI AG STL QL IA: NEGATIVE
SPECIMEN SOURCE: NORMAL

## 2025-01-04 ENCOUNTER — HOSPITAL ENCOUNTER (OUTPATIENT)
Facility: HOSPITAL | Age: 75
End: 2025-01-04
Attending: PODIATRIST
Payer: MEDICARE

## 2025-01-04 DIAGNOSIS — S84.12XA COMMON PERONEAL NERVE DYSFUNCTION OF LEFT LOWER EXTREMITY, INITIAL ENCOUNTER: ICD-10-CM

## 2025-01-04 DIAGNOSIS — M25.572 SINUS TARSI SYNDROME OF LEFT FOOT: ICD-10-CM

## 2025-01-04 DIAGNOSIS — M79.672 PAIN IN LEFT FOOT: ICD-10-CM

## 2025-01-04 PROCEDURE — 73721 MRI JNT OF LWR EXTRE W/O DYE: CPT

## 2025-01-27 ENCOUNTER — ANESTHESIA EVENT (OUTPATIENT)
Facility: HOSPITAL | Age: 75
End: 2025-01-27
Payer: MEDICARE

## 2025-01-27 ENCOUNTER — ANESTHESIA (OUTPATIENT)
Facility: HOSPITAL | Age: 75
End: 2025-01-27
Payer: MEDICARE

## 2025-01-27 ENCOUNTER — HOSPITAL ENCOUNTER (OUTPATIENT)
Facility: HOSPITAL | Age: 75
Setting detail: OUTPATIENT SURGERY
Discharge: HOME | End: 2025-01-27
Attending: INTERNAL MEDICINE | Admitting: INTERNAL MEDICINE
Payer: MEDICARE

## 2025-01-27 VITALS
HEIGHT: 62 IN | RESPIRATION RATE: 18 BRPM | DIASTOLIC BLOOD PRESSURE: 77 MMHG | OXYGEN SATURATION: 99 % | HEART RATE: 79 BPM | BODY MASS INDEX: 25.58 KG/M2 | SYSTOLIC BLOOD PRESSURE: 171 MMHG | WEIGHT: 139 LBS | TEMPERATURE: 98 F

## 2025-01-27 PROCEDURE — 7100000010 HC PHASE II RECOVERY - FIRST 15 MIN: Performed by: INTERNAL MEDICINE

## 2025-01-27 PROCEDURE — 88342 IMHCHEM/IMCYTCHM 1ST ANTB: CPT

## 2025-01-27 PROCEDURE — 2709999900 HC NON-CHARGEABLE SUPPLY: Performed by: INTERNAL MEDICINE

## 2025-01-27 PROCEDURE — 3600007502: Performed by: INTERNAL MEDICINE

## 2025-01-27 PROCEDURE — 3700000000 HC ANESTHESIA ATTENDED CARE: Performed by: INTERNAL MEDICINE

## 2025-01-27 PROCEDURE — 88305 TISSUE EXAM BY PATHOLOGIST: CPT

## 2025-01-27 PROCEDURE — 7100000011 HC PHASE II RECOVERY - ADDTL 15 MIN: Performed by: INTERNAL MEDICINE

## 2025-01-27 PROCEDURE — 6360000002 HC RX W HCPCS: Performed by: NURSE ANESTHETIST, CERTIFIED REGISTERED

## 2025-01-27 PROCEDURE — 2580000003 HC RX 258: Performed by: INTERNAL MEDICINE

## 2025-01-27 RX ORDER — VITAMIN E 268 MG
400 CAPSULE ORAL DAILY
COMMUNITY

## 2025-01-27 RX ORDER — MULTIVIT WITH MINERALS/LUTEIN
250 TABLET ORAL DAILY
COMMUNITY

## 2025-01-27 RX ORDER — SODIUM CHLORIDE 0.9 % (FLUSH) 0.9 %
5-40 SYRINGE (ML) INJECTION EVERY 12 HOURS SCHEDULED
Status: DISCONTINUED | OUTPATIENT
Start: 2025-01-27 | End: 2025-01-27 | Stop reason: HOSPADM

## 2025-01-27 RX ORDER — SODIUM CHLORIDE 0.9 % (FLUSH) 0.9 %
5-40 SYRINGE (ML) INJECTION PRN
Status: DISCONTINUED | OUTPATIENT
Start: 2025-01-27 | End: 2025-01-27 | Stop reason: HOSPADM

## 2025-01-27 RX ORDER — SODIUM CHLORIDE 9 MG/ML
INJECTION, SOLUTION INTRAVENOUS CONTINUOUS
Status: DISCONTINUED | OUTPATIENT
Start: 2025-01-27 | End: 2025-01-27 | Stop reason: HOSPADM

## 2025-01-27 RX ORDER — CHOLECALCIFEROL (VITAMIN D3) 1250 MCG
CAPSULE ORAL
COMMUNITY

## 2025-01-27 RX ORDER — SODIUM CHLORIDE 9 MG/ML
INJECTION, SOLUTION INTRAVENOUS PRN
Status: DISCONTINUED | OUTPATIENT
Start: 2025-01-27 | End: 2025-01-27 | Stop reason: HOSPADM

## 2025-01-27 RX ORDER — LIDOCAINE HYDROCHLORIDE 20 MG/ML
INJECTION, SOLUTION EPIDURAL; INFILTRATION; INTRACAUDAL; PERINEURAL
Status: DISCONTINUED | OUTPATIENT
Start: 2025-01-27 | End: 2025-01-27 | Stop reason: SDUPTHER

## 2025-01-27 RX ADMIN — PROPOFOL 25 MG: 10 INJECTION, EMULSION INTRAVENOUS at 11:54

## 2025-01-27 RX ADMIN — PROPOFOL 50 MG: 10 INJECTION, EMULSION INTRAVENOUS at 11:47

## 2025-01-27 RX ADMIN — LIDOCAINE HYDROCHLORIDE 60 MG: 20 INJECTION, SOLUTION EPIDURAL; INFILTRATION; INTRACAUDAL; PERINEURAL at 11:47

## 2025-01-27 RX ADMIN — SODIUM CHLORIDE: 9 INJECTION, SOLUTION INTRAVENOUS at 11:28

## 2025-01-27 RX ADMIN — PROPOFOL 50 MG: 10 INJECTION, EMULSION INTRAVENOUS at 11:51

## 2025-01-27 ASSESSMENT — PAIN - FUNCTIONAL ASSESSMENT
PAIN_FUNCTIONAL_ASSESSMENT: NONE - DENIES PAIN
PAIN_FUNCTIONAL_ASSESSMENT: NONE - DENIES PAIN

## 2025-01-27 NOTE — ANESTHESIA POSTPROCEDURE EVALUATION
Department of Anesthesiology  Postprocedure Note    Patient: Christin Aguayo  MRN: [id number]  YOB: 1950  Date of evaluation: 1/27/2025    Procedure Summary       Date: 01/27/25 Room / Location: [facility] ENDO 03 / [facility] ENDOSCOPY    Anesthesia Start: 1145 Anesthesia Stop: 1155    Procedure: ESOPHAGOGASTRODUODENOSCOPY Diagnosis:       Diverticulosis      (Diverticulosis [K57.90])    Surgeons: Jennifer Abernathy MD Responsible Provider: Jean-Paul Maldonado MD    Anesthesia Type: MAC ASA Status: 2            Anesthesia Type: MAC    Rosas Phase I: Rosas Score: 10    Rosas Phase II: Rosas Score: 9    Anesthesia Post Evaluation    Patient location during evaluation: bedside  Nausea & Vomiting: no nausea  Cardiovascular status: blood pressure returned to baseline  Respiratory status: acceptable  Hydration status: euvolemic    No notable events documented.

## 2025-01-27 NOTE — INTERVAL H&P NOTE
Pre-Endoscopy H&P Update  Chief complaint/HPI/ROS:  The indication for the procedure, the patient's history and the patient's current medications are reviewed prior to the procedure and that data is reported on the H&P to which this document is attached.  Any significant complaints with regard to organ systems will be noted, and if not mentioned then a review of systems is not contributory.  Past Medical History:   Diagnosis Date    Blood type O-(neg) - issue 10/2014    with anti-M antibodies ([place] had to obtain from out of state in the past).    Diverticulitis 8/3/2010    Follow up colonoscopy neg    Diverticulosis 03/10/2017    Fatty liver disease, nonalcoholic 6/5/2017    History of esophageal ulcer (age 10)    has not recurred    History of ganglion cyst     left wrist - removed    History of shingles 2016    HTN     Hypercholesterolemia with hypertriglyceridemia 2005    controlled on medication    Hypovitaminosis D     Kidney stone     Migraines     migraines    MVP (mitral valve prolapse)     Smoker 6/5/2017      Past Surgical History:   Procedure Laterality Date    ACHILLES TENDON SURGERY Left 9/13/2023    LEFT FOOT REPAIR OF PERONEAL TENDONS (IV CONSCIOUS SEDATION WITH REGIONAL BLOCK) performed by Davidson Dodson DPM at [facility] MAIN OR    APPENDECTOMY      Age 6    COLONOSCOPY N/A 3/10/2017    COLONOSCOPY performed by Colby Wallace MD at [facility] ENDOSCOPY    COLONOSCOPY  2010 2020 for next    COLONOSCOPY N/A 3/7/2024    COLONOSCOPY performed by Jennifer Abernathy MD at [facility] ENDOSCOPY    CYSTOURETHROSCOPY  10/9/2014     performed by Vik Shoemaker MD at [facility] MAIN OR    GYN  1987    BTL    GYN  10/2014    Lap LEONA/BSO    LAPAROSCOPY W TOT HYSTERECTUTERUS <=250 GRAM  W TUBE/OVARY N/A 10/9/2014    ROBOTIC ASSISTED TOTAL LAPAROSCOPIC HYSTERECTOMY / BILATERAL SALPINGO OOPHORECTOMY / PARTIAL PARACERVICAL VAGINECTOMY, CYSTOSCOPY performed by Vik Shoemaker MD at [facility] MAIN OR    ORTHOPEDIC SURGERY  1976    L

## 2025-01-27 NOTE — DISCHARGE INSTRUCTIONS
[facility]  [facility]  DEMETRI Arevaol MD  [phone]      January 27, 2025     Christin Aguayo  YOB: 1950    ENDOSCOPY DISCHARGE INSTRUCTIONS    If there is redness at IV site you should apply warm compress to area.  If redness or soreness persist contact Dr. Arevalo or your primary care doctor.    Gaseous discomfort may develop, but walking, belching will help relieve this.  You may not operate a vehicle for 12 hours  You may not operate machinery or dangerous appliances for rest of today  You may not drink alcoholic beverages for 12 hours  Avoid making any critical decisions for 24 hours    DIET:  You may resume your normal diet, but some patients find that heavy or large meals may lead to indigestion or vomiting.  I suggest a light meal as first food intake.    MEDICATIONS:  The use of some over-the-counter pain medication may lead to bleeding after biopsies or other procedures you may have had done.  Tylenol (also called acetaminophen) is safe to take and will not lead to bleeding.  Based on the procedure you had today you may safely take aspirin or aspirin-like products for the next seven (7) days.      ACTIVITY:  You may resume your normal household activities, but it is recommended that you spend the remainder of the day resting -  avoid any strenuous activity.     CALL DR. AREVALO'S OFFICE IF:  Increasing pain, nausea, vomiting  Abdominal distension (swelling)  Significant new or increased bleeding (oral or rectal)  Fever/Chills  Chest pain/shortness of breath                   Additional instructions:   Impression: Normal upper endoscopy.  Ulcer is healed.  Biopsies taken from stomach to assess for H. pylori.           Recommendations:  Await pathology results.  Can decrease pantoprazole to once daily from twice daily and stay on this dose indefinitely.       DEMETRI Arevalo MD

## 2025-01-27 NOTE — ANESTHESIA PRE PROCEDURE
Department of Anesthesiology  Preprocedure Note       Name:  Christin Aguayo   Age:  74 y.o.  :  1950                                          MRN:  [id number]         Date:  2025      Surgeon: Surgeon(s):  Jennifer Abernathy MD    Procedure: Procedure(s):  ESOPHAGOGASTRODUODENOSCOPY    Medications prior to admission:   Prior to Admission medications    Medication Sig Start Date End Date Taking? Authorizing Provider   pantoprazole (PROTONIX) 40 MG tablet Take 1 tablet by mouth 2 times daily (before meals) 24  Karen Cartagena PA-C   Rosuvastatin Calcium 20 MG CPSP Take by mouth nightly    Provider, MD Alyssa   amLODIPine (NORVASC) 5 MG tablet Take 1 tablet by mouth nightly    Automatic Reconciliation, Ar       Current medications:    Current Facility-Administered Medications   Medication Dose Route Frequency Provider Last Rate Last Admin   • 0.9 % sodium chloride infusion   IntraVENous Continuous Jennifer Abernathy MD       • sodium chloride flush 0.9 % injection 5-40 mL  5-40 mL IntraVENous 2 times per day Jennifer Abernathy MD       • sodium chloride flush 0.9 % injection 5-40 mL  5-40 mL IntraVENous PRN Jennifer Abernathy MD       • 0.9 % sodium chloride infusion   IntraVENous PRN Jennifer Abernathy MD           Allergies:    Allergies   Allergen Reactions   • Codeine Nausea Only     hives       Problem List:    Patient Active Problem List   Diagnosis Code   • Fatty liver disease, nonalcoholic K76.0   • Personal history of nicotine dependence Z87.891   • Smoking F17.200   • Solitary pulmonary nodule on lung CT R91.1   • Hypovitaminosis D E55.9   • History of colonoscopy Z98.890   • Hypercholesterolemia with hypertriglyceridemia E78.2   • Benign essential hypertension I10   • Acute diverticulitis of intestine K57.92   • Migraine headache G43.909   • Abnormal antibody titer R76.0   • 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

## 2025-01-27 NOTE — OP NOTE
[facility]  [facility]  DEMETRI Abernathy Jr, MD  [phone]      2025    Esophagogastroduodenoscopy (EGD) Procedure Note  Christin Aguayo  : 1950  [facility] Medical Record Number: [id number]      Indications:   History of duodenal ulcer  Referring Physician:  Denys Alejandro MD  Anesthesia/Sedation: See Anesthesia Record  Endoscopist:  Dr. DEMETRI Abernathy Jr  Complications:  None  Estimated Blood Loss:  None    Surgical assistant: Circulator: Juan Manuel Chavez RN  Endoscopy Technician: Dario Soto none unless otherwise specified.     Permit:  The indications, risks, benefits and alternatives were reviewed with the patient or their decision maker who was provided an opportunity to ask questions and all questions were answered.  The specific risks of esophagogastroduodenoscopy with conscious sedation were reviewed, including but not limited to anesthetic complication, bleeding, adverse drug reaction, missed lesion, infection, IV site reactions, and intestinal perforation which would lead to the need for surgical repair.  Alternatives to EGD including radiographic imaging, observation without testing, or laboratory testing were reviewed as well as the limitations of those alternatives discussed.  After considering the options and having all their questions answered, the patient or their decision maker provided both verbal and written consent to proceed.       Procedure in Detail:  After obtaining informed consent, positioning of the patient in the left lateral decubitus position, and conduction of a pre-procedure pause or \"time out\" the endoscope was introduced into the mouth and advanced to the duodenum.  A careful inspection was made, and findings or interventions are described below.    Findings:   Esophagus: Normal.  Stomach: Normal.  Random biopsies taken.  Duodenum/jejunum: No

## 2025-01-27 NOTE — H&P
74 y.o. female presents for diagnostic upper endoscopy for history of peptic ulcer.  Additional H&P data will be attached on the day of procedure.    Jennifer Abernathy Jr, MD

## 2025-02-28 ENCOUNTER — TRANSCRIBE ORDERS (OUTPATIENT)
Facility: HOSPITAL | Age: 75
End: 2025-02-28

## 2025-02-28 DIAGNOSIS — Z12.31 OTHER SCREENING MAMMOGRAM: Primary | ICD-10-CM

## 2025-03-06 ENCOUNTER — HOSPITAL ENCOUNTER (OUTPATIENT)
Facility: HOSPITAL | Age: 75
Discharge: HOME | End: 2025-03-09
Payer: MEDICARE

## 2025-03-06 VITALS — BODY MASS INDEX: 25.76 KG/M2 | HEIGHT: 62 IN | WEIGHT: 140 LBS

## 2025-03-06 DIAGNOSIS — Z12.31 OTHER SCREENING MAMMOGRAM: ICD-10-CM

## 2025-03-06 PROCEDURE — 77063 BREAST TOMOSYNTHESIS BI: CPT

## 2025-03-11 ENCOUNTER — HOSPITAL ENCOUNTER (OUTPATIENT)
Facility: HOSPITAL | Age: 75
Discharge: HOME | End: 2025-03-14
Payer: MEDICARE

## 2025-03-11 DIAGNOSIS — R92.8 ABNORMAL MAMMOGRAM OF LEFT BREAST: ICD-10-CM

## 2025-03-11 PROCEDURE — 76642 ULTRASOUND BREAST LIMITED: CPT

## 2025-03-11 PROCEDURE — G0279 TOMOSYNTHESIS, MAMMO: HCPCS

## 2025-07-16 NOTE — DISCHARGE SUMMARY
Discharge Summary       PATIENT ID: Christin Aguayo  MRN: [id number]   YOB: 1950    DATE OF ADMISSION: 12/6/2024 10:51 AM    DATE OF DISCHARGE: 12/9/2024   PRIMARY CARE PROVIDER: Denys Alejandro MD     ATTENDING PHYSICIAN: Dr. Mayela Horta  DISCHARGING PROVIDER: Karen Cartagena PA-C    To contact this individual call [phone] and ask the  to page.  If unavailable ask to be transferred the Adult Hospitalist Department.    CONSULTATIONS: IP CONSULT TO GI  IP CONSULT TO SPIRITUAL SERVICES    PROCEDURES/SURGERIES: Procedure(s):  ESOPHAGOGASTRODUODENOSCOPY     ADMITTING DIAGNOSES & HOSPITAL COURSE:   Christin Aguayo is a 74 y.o. female with hx of htn, dyslipidemia, migraines, bryan, hx of tobacco use who presented to ed with complaints of chest pain.  Had been in her usual state of health until earlier this morning when she noted severe, 10/10 sharp pain underneath her left breast.  Has since improved while in the ed.     The patient denies any fever, chills, chest or abdominal pain, nausea, vomiting, cough, congestion, recent illness, palpitations, or dysuria.  Remarkable vitals on ER Presentation: vss  Labs Remarkable for: k-3.3  ER Images: cxr: no acute process  ER Rx: morphine, tylenol, zofran, heprain gtt    EGD Findings:  Esophagus: normal  Stomach: patchy antral erythema - biopsies deferred  Duodenum: in the distal bulb extending into the duodenal sweep there was luminal deformity and stenosis. A deep, cratered ulcer was seen with clean based and likely fibrous food contents within the base. Attempt to traverse this region was deferred. Biopsies were deferred.  DISCHARGE DIAGNOSES / PLAN:      Duodenal peptic ulcer/duodenitis  Lower Chest pain likely due to above (Resolved)  -Troponin negative   -GI consulted: Continue BID PPI, avoid NSAIDs, continue ABX for diverticulitis, H. Pylori stool test ordered. Patient tolerated regular diet and is OK from GI standpoint to d/c home on BID  Patient Name: Chula Chen  : 1932    MRN: [id number]                              Today's Date: 2025       Admit Date: 7/15/2025    Visit Dx:     ICD-10-CM ICD-9-CM   1. Closed fracture of neck of right femur, initial encounter  S72.001A 820.8   2. Laceration without foreign body of scalp, initial encounter  S01.01XA 873.0     Patient Active Problem List   Diagnosis    Gastroesophageal reflux disease without esophagitis    Depression    Essential hypertension    Other insomnia    Benign essential tremor    Other constipation    Vitamin D deficiency    Urinary frequency    Pneumonia    COVID-19 virus detected    Cytokine release syndrome, grade 1    Hip fracture     Past Medical History:   Diagnosis Date    Cystocele, midline     Hemorrhoids      Past Surgical History:   Procedure Laterality Date    BREAST BIOPSY      cyst removed    CATARACT EXTRACTION Bilateral     HIP HEMIARTHROPLASTY Right 7/15/2025    Procedure: HIP HEMIARTHROPLASTY;  Surgeon: Salas Arellano MD;  Location: [facility] include [facility] beds at the [facility];  Service: Orthopedics;  Laterality: Right;    THYROID SURGERY      nodule removal, Benign      General Information       Row Name 25 1419          OT Time and Intention    Document Type evaluation  -JY     Mode of Treatment occupational therapy;co-treatment  -JY       Row Name 25 1419          General Information    Patient Profile Reviewed yes  -JY     Prior Level of Function independent:;all household mobility;community mobility;w/c or scooter;gait;transfer;bed mobility;feeding;grooming;dressing;min assist:;bathing;dependent:;home management;cooking;cleaning  pt in [facility] w/ A for bathing & home mgmt, cleaning, laundry; able to t/f to bathroom & in apt w/ FWW during daytime, calls for A at night; occ goes to dining area in another part of facility, often meals delivered; endorses the one fall; no O2 at baseline  -JY     Existing Precautions/Restrictions fall;right;hip, posterior;oxygen  therapy device and L/min;other (see comments)  WBAT RLE w/ posterior hip precautions fascia iliaca nerve block, scoliosis at baseline  -JY     Barriers to Rehab medically complex;previous functional deficit  -JY       Row Name 07/16/25 1419          Occupational Profile    Environmental Supports and Barriers (Occupational Profile) lives in longterm w/ step over tub/shower w/ small threshold (often shower chair brought to pt by staff), has grab bars; DME: uses FWW in apt, short distances, uses w/c outside of apt and in community  -JY     Patient Goals (Occupational Profile) to return to PLOF  -JY       Row Name 07/16/25 1419          Living Environment    Current Living Arrangements assisted living facility  -JY     People in Home facility resident  -JY       Row Name 07/16/25 1419          Home Main Entrance    Number of Stairs, Main Entrance none  -JY       Row Name 07/16/25 1419          Stairs Within Home, Primary    Number of Stairs, Within Home, Primary none  -JY     Stairs Comment, Within Home, Primary questionable report of steps to other building where dining area is  -DEMARCUS       Row Name 07/16/25 1419          Safety Issues/Impairments Affecting Functional Mobility    Safety Issues Affecting Function (Mobility) awareness of need for assistance;insight into deficits/self-awareness;positioning of assistive device;problem-solving;safety precaution awareness;safety precautions follow-through/compliance;sequencing abilities  -JY     Impairments Affecting Function (Mobility) balance;coordination;endurance/activity tolerance;motor control;pain;postural/trunk control;strength  -JY     Comment, Safety Issues/Impairments (Mobility) pt alert and able to follow commmads; pt not cognizant of PHP and req'd education prior to mobility, improved recall as session progressed  -JY               User Key  (r) = Recorded By, (t) = Taken By, (c) = Cosigned By      Initials Name Provider Type    Loree Banks, OT Occupational  Therapist                     Mobility/ADL's       Row Name 07/16/25 1425          Bed Mobility    Bed Mobility supine-sit;scooting/bridging  -     Scooting/Bridging [place] (Bed Mobility) moderate assist (50% patient effort);2 person assist;verbal cues;nonverbal cues (demo/gesture)  -     Supine-Sit [place] (Bed Mobility) moderate assist (50% patient effort);2 person assist;verbal cues;nonverbal cues (demo/gesture)  -     Bed Mobility, Safety Issues decreased use of legs for bridging/pushing;decreased use of arms for pushing/pulling  -     Assistive Device (Bed Mobility) head of bed elevated;bed rails;repositioning sheet  -     Comment, (Bed Mobility) skilled cues for optimal hand placement and sequencing to advance LEs to EOB, reach across midline toward toward bedrail for support if available, otherwise push through UE closest to bed to upright trunk into sitting, advance hips to EOB for symmetry for stability while maintaining PHP throughout; pt req'd increased time and effort for LE advancement and uprighting trunk w/ most A for RLE; denied any dizziness or feeling LH at EOB  -       Row Name 07/16/25 1425          Transfers    Transfers sit-stand transfer;stand-sit transfer;bed-chair transfer  -     Comment, (Transfers) skilled cues for optimal hand placement for controlled ascend, descend specifically to push up from seated surface, to reach back prior to sitting with slight forward step at surgical LE for comfort when sitting once aligned and in close proximity; 2 first STS from EOB with FWW and last with BUE support and closer proximity of A to be able to block B knees and provide close BUE support with improvement in postural alignment and safety noted, cont'd to have difficulty with movement of RLE and req'd max A x 2 throughout  -Y       Row Name 07/16/25 1425          Bed-Chair Transfer    Bed-Chair [place] (Transfers) maximum assist (25% patient effort);2 person  assist;verbal cues;nonverbal cues (demo/gesture)  -JY     Assistive Device (Bed-Chair Transfers) other (see comments)  -[facility] Name 07/16/25 1425          Sit-Stand Transfer    Sit-Stand [place] (Transfers) maximum assist (25% patient effort);2 person assist;verbal cues;nonverbal cues (demo/gesture)  -J     Assistive Device (Sit-Stand Transfers) walker, front-wheeled;other (see comments)  BUE support  -J     Comment, (Sit-Stand Transfer) initially FWW transitioned to BUE support  -DAYRON       Row Name 07/16/25 1425          Stand-Sit Transfer    Stand-Sit [place] (Transfers) maximum assist (25% patient effort);2 person assist;verbal cues;nonverbal cues (demo/gesture)  -JY     Assistive Device (Stand-Sit Transfers) walker, front-wheeled;other (see comments)  BUE support  -DAYRON       [facility] Name 07/16/25 1425          Functional Mobility    Functional Mobility- Comment defer to PT for specifics  -JY     Patient was able to Ambulate yes  fxl side steps to recliner  -[facility] Name 07/16/25 1425          Activities of Daily Living    BADL Assessment/Intervention lower body dressing;upper body dressing  -JY       Row Name 07/16/25 1425          Mobility    Extremity Weight-bearing Status right lower extremity  -JY     Right Lower Extremity (Weight-bearing Status) weight-bearing as tolerated (WBAT)  -[facility] Name 07/16/25 1425          Lower Body Dressing Assessment/Training    [place] Level (Lower Body Dressing) doff;don;socks;dependent (less than 25% patient effort)  -JY     Position (Lower Body Dressing) supine;edge of bed sitting  -JY     Comment, (Lower Body Dressing) issued  AE and initiated educated pt on  AE to assist with LB ADLs emphasizing purpose to maximize I, decrease pain and energy expenditure during LBD while adhering to PHP; cues to limit flexion  -[facility] Name 07/16/25 1425          Upper Body Dressing Assessment/Training    [place] Level (Upper Body Dressing)  doff;don;pajama/robe;moderate assist (50% patient effort);verbal cues  -JY     Position (Upper Body Dressing) edge of bed sitting  -JY     Comment, (Upper Body Dressing) mod A for proximal and posterior mgmt of gown; maintains RUE more guarded and difficulty w/ L & R differentiation impacting threading and unthreading  -JY               User Key  (r) = Recorded By, (t) = Taken By, (c) = Cosigned By      Initials Name Provider Type    Loree Banks OT Occupational Therapist                   Obj/Interventions       [facility] Name 07/16/25 1433          Sensory Assessment (Somatosensory)    Sensory Assessment (Somatosensory) bilateral UE;sensation intact  -     Bilateral UE Sensory Assessment general sensation;light touch awareness;intact  -     Sensory Assessment denies any numbness or tingling and able to recognize LT stimuli as intact and symmetrical at BUEs  -[facility] 07/16/25 1433          Vision Assessment/Intervention    Visual Impairment/Limitations corrective lenses for reading  -     Vision Assessment Comment denies any acute changes to vision  -JY       Row Name 07/16/25 1433          Range of Motion Comprehensive    General Range of Motion bilateral upper extremity ROM WFL  -JY       Row Name 07/16/25 1433          Strength Comprehensive (MMT)    General Manual Muscle Testing (MMT) Assessment upper extremity strength deficits identified  -     Comment, General Manual Muscle Testing (MMT) Assessment [facility] MMS grossly 4-/5 to 4/5 per MMT with most deficits at R shoulder  -JY       Row Name 07/16/25 1433          Motor Skills    Motor Skills functional endurance;coordination  -     Coordination bilateral;upper extremity;finger to nose;other (see comments);minimal impairment  finger thumb opposition  -     Functional Endurance decreased activity tolerance toward more dynamic demands, fatigued easily with standing attempts and t/f to recliner  -JY       Row Name 07/16/25 1433          Balance     Balance Assessment sitting static balance;sitting dynamic balance;standing static balance;standing dynamic balance  -JY     Static Sitting Balance standby assist  -JY     Dynamic Sitting Balance contact guard  -JY     Position, Sitting Balance unsupported;sitting edge of bed  -JY     Static Standing Balance moderate assist;2-person assist;verbal cues  -JY     Dynamic Standing Balance maximum assist;2-person assist;verbal cues  -JY     Position/Device Used, Standing Balance supported;walker, front-wheeled;other (see comments)  BUE support  -JY     Balance Interventions sitting;standing;static;dynamic;sit to stand;supported;occupation based/functional task  -JY     Comment, Balance pt with posterior lean in standing, req'd repeated cues to weight shift toward toes to bring self forward  -JY               User Key  (r) = Recorded By, (t) = Taken By, (c) = Cosigned By      Initials Name Provider Type    Loree Banks OT Occupational Therapist                   Goals/Plan       Row Name 07/16/25 1444          Transfer Goal 1 (OT)    Activity/Assistive Device (Transfer Goal 1, OT) sit-to-stand/stand-to-sit;bed-to-chair/chair-to-bed;commode, bedside without drop arms;other (see comments)  AD recs per PT  -JY     [place] Level/Cues Needed (Transfer Goal 1, OT) moderate assist (50-74% patient effort);other (see comments)  x2  -JY     Time Frame (Transfer Goal 1, OT) long term goal (LTG);10 days  -JY     Progress/Outcome (Transfer Goal 1, OT) new goal  -JY       Row Name 07/16/25 1444          Dressing Goal 1 (OT)    Activity/Device (Dressing Goal 1, OT) upper body dressing;lower body dressing;other (see comments)  d/d TB garments with LH AE PRN while adhering to PHP  -JY     [place]/Cues Needed (Dressing Goal 1, OT) minimum assist (75% or more patient effort);moderate assist (50-74% patient effort);verbal cues required  -JY     Time Frame (Dressing Goal 1, OT) long term goal (LTG);10 days  -JY      Progress/Outcome (Dressing Goal 1, OT) new goal  -JY       Row Name 07/16/25 1444          Grooming Goal 1 (OT)    Activity/Device (Grooming Goal 1, OT) hair care;oral care;wash face, hands  -JY     [place] (Grooming Goal 1, OT) standby assist;other (see comments)  while sitting less supported w/ good balance  -JY     Time Frame (Grooming Goal 1, OT) short term goal (STG);1 week  -JY     Progress/Outcome (Grooming Goal 1, OT) new goal  -JY       Row Name 07/16/25 1444          Strength Goal 1 (OT)    Strength Goal 1 (OT) Pt to complete seated HEP encompassing BUEs targeting strength and endurance with progressive sets/reps/resistance in order to improve integration in ADLs, related t/fs and mobility as appropriate  -JY     Time Frame (Strength Goal 1, OT) short term goal (STG);1 week  -JY     Progress/Outcome (Strength Goal 1, OT) new goal  -       Row Name 07/16/25 1444          Therapy Assessment/Plan (OT)    Planned Therapy Interventions (OT) activity tolerance training;adaptive equipment training;BADL retraining;functional balance retraining;neuromuscular control/coordination retraining;occupation/activity based interventions;patient/caregiver education/training;ROM/therapeutic exercise;strengthening exercise;transfer/mobility retraining  -JY               User Key  (r) = Recorded By, (t) = Taken By, (c) = Cosigned By      Initials Name Provider Type    Loree Banks, JAKE Occupational Therapist                   Clinical Impression       Row Name 07/16/25 1436          Pain Assessment    Pretreatment Pain Rating 6/10  -JY     Posttreatment Pain Rating 6/10  -JY     Pain Location hip  -JY     Pain Side/Orientation right  -JY     Pain Management Interventions activity modification encouraged;exercise or physical activity utilized;positioning techniques utilized  -JY     Response to Pain Interventions activity participation with tolerable pain  -JY     Pre/Posttreatment Pain Comment pt tolerated OT  interventions yet reported pain w/ movement  -JY       Row Name 07/16/25 1436          Plan of Care Review    Plan of Care Reviewed With patient  -JY     Progress no change  OT IE  -JY     Outcome Evaluation OT evaluation completed. Pt presents with decreased I in ADLs, related t/fs and mobility compared to PLOF limited by decreased activity tolerance, impaired balance, muscle weakness at BUEs, fatigue with more dynamic demands, decreased distal reach impacting LB ADLs further impacted by PHP. Pt req'd A for all ADLs including mod A for d/d gown, dep A for sock d/d and mod A x 2 for supine > sitting at EOB and max A x 2 for all STS t/fs at EOB and fxl t/f pivot to recliner initially with FWW and later w/ BUE support for closer proximity to pt. Pt improved safety and postural aligment with latter, still same level of A. Pt below occupational performance baseline and would benefit from IPOT POC and SNF at d/c when medically ready.  -JY       Row Name 07/16/25 [facility]          Therapy Assessment/Plan (OT)    Patient/Family Therapy Goal Statement (OT) to return to PLOF  -JY     Rehab Potential (OT) good  -JY     Criteria for Skilled Therapeutic Interventions Met (OT) yes;skilled treatment is necessary  -JY     Therapy Frequency (OT) daily  -JY     Predicted Duration of Therapy Intervention (OT) 10 days  -JY       Row Name 07/16/25 1436          Therapy Plan Review/Discharge Plan (OT)    Equipment Needs Upon Discharge (OT) dressing equipment;bathing equipment  -JY     Anticipated Discharge Disposition (OT) skilled nursing facility  -       Row Name 07/16/25 1436          Vital Signs    Pre Systolic BP Rehab 99  -JY     Pre Treatment Diastolic BP 44  -JY     Intra Systolic BP Rehab 113  -JY     Intra Treatment Diastolic BP 62  -JY     Post Systolic BP Rehab 114  -JY     Post Treatment Diastolic BP 55  -JY     Pre SpO2 (%) 95  -JY     O2 Delivery Pre Treatment supplemental O2  -JY     O2 Delivery Intra Treatment  supplemental O2  -JY     Post SpO2 (%) 95  -JY     O2 Delivery Post Treatment supplemental O2  -JY     Pre Patient Position Supine  -JY     Intra Patient Position Sitting  -JY     Post Patient Position Sitting  -JY       Row Name 07/16/25 1436          Positioning and Restraints    Pre-Treatment Position in bed  -JY     Post Treatment Position chair  -JY     In Chair notified nsg;reclined;call light within reach;encouraged to call for assist;exit alarm on;waffle cushion;on mechanical lift sling;compression device;ABD pillow;heels elevated  nerve cath intact  -JY               User Key  (r) = Recorded By, (t) = Taken By, (c) = Cosigned By      Initials Name Provider Type    Loree Banks, OT Occupational Therapist                   Outcome Measures       Row Name 07/16/25 1446          How much help from another is currently needed...    Putting on and taking off regular lower body clothing? 1  -JY     Bathing (including washing, rinsing, and drying) 2  -JY     Toileting (which includes using toilet bed pan or urinal) 1  -JY     Putting on and taking off regular upper body clothing 2  -JY     Taking care of personal grooming (such as brushing teeth) 3  -JY     Eating meals 3  -JY     AM-PAC 6 Clicks Score (OT) 12  -JY       Row Name 07/16/25 0805          How much help from another person do you currently need...    Turning from your back to your side while in flat bed without using bedrails? 2  -BC     Moving from lying on back to sitting on the side of a flat bed without bedrails? 2  -BC     Moving to and from a bed to a chair (including a wheelchair)? 2  -BC     Standing up from a chair using your arms (e.g., wheelchair, bedside chair)? 2  -BC     Climbing 3-5 steps with a railing? 1  -BC     To walk in hospital room? 1  -BC     AM-PAC 6 Clicks Score (PT) 10  -BC       Row Name 07/16/25 1446          Functional Assessment    Outcome Measure Options AM-PAC 6 Clicks Daily Activity (OT)  -JY               User  Key  (r) = Recorded By, (t) = Taken By, (c) = Cosigned By      Initials Name Provider Type    BC Amanda Garay, RN Registered Nurse    Loree Banks OT Occupational Therapist                    Occupational Therapy Education       Title: PT OT SLP Therapies (In Progress)       Topic: Occupational Therapy (In Progress)       Point: ADL training (In Progress)       Learning Progress Summary            Patient Acceptance, E,D, NR by DEMARCUS at 7/16/2025 1320                      Point: Precautions (In Progress)       Learning Progress Summary            Patient Acceptance, E,D, NR by DEMARCUS at 7/16/2025 1320                      Point: Body mechanics (In Progress)       Learning Progress Summary            Patient Acceptance, E,D, NR by DEMARCUS at 7/16/2025 1320                                      User Key       Initials Effective Dates Name Provider Type Discipline    DEMARCUS 06/16/21 -  Loree Phillips OT Occupational Therapist OT                  OT Recommendation and Plan  Planned Therapy Interventions (OT): activity tolerance training, adaptive equipment training, BADL retraining, functional balance retraining, neuromuscular control/coordination retraining, occupation/activity based interventions, patient/caregiver education/training, ROM/therapeutic exercise, strengthening exercise, transfer/mobility retraining  Therapy Frequency (OT): daily  Plan of Care Review  Plan of Care Reviewed With: patient  Progress: no change (OT IE)  Outcome Evaluation: OT evaluation completed. Pt presents with decreased I in ADLs, related t/fs and mobility compared to PLOF limited by decreased activity tolerance, impaired balance, muscle weakness at BUEs, fatigue with more dynamic demands, decreased distal reach impacting LB ADLs further impacted by PHP. Pt req'd A for all ADLs including mod A for d/d gown, dep A for sock d/d and mod A x 2 for supine > sitting at EOB and max A x 2 for all STS t/fs at EOB and fxl t/f pivot to recliner initially  with FWW and later w/ BUE support for closer proximity to pt. Pt improved safety and postural aligment with latter, still same level of A. Pt below occupational performance baseline and would benefit from IPOT POC and SNF at d/c when medically ready.     Time Calculation:   Evaluation Complexity (OT)  Review Occupational Profile/Medical/Therapy History Complexity: expanded/moderate complexity  Assessment, Occupational Performance/Identification of Deficit Complexity: 3-5 performance deficits  Clinical Decision Making Complexity (OT): detailed assessment/moderate complexity  Overall Complexity of Evaluation (OT): moderate complexity     Time Calculation- OT       Row Name 07/16/25 1447             Time Calculation- OT    OT Start Time 1320  -JY      OT Received On 07/16/25  -JY      OT Goal Re-Cert Due Date 07/26/25  -JY         Untimed Charges    OT Eval/Re-eval Minutes 69  -JY         Total Minutes    Untimed Charges Total Minutes 69  -JY       Total Minutes 69  -JY                User Key  (r) = Recorded By, (t) = Taken By, (c) = Cosigned By      Initials Name Provider Type    Loree Banks OT Occupational Therapist                  Therapy Charges for Today       Code Description Service Date Service Provider Modifiers Qty    [id number] HC-OT EVAL MOD COMPLEXITY 5 7/16/2025 Loree Phillips OT  1                 Loree Phillips OT  7/16/2025

## (undated) DEVICE — SYRINGE,TOOMEY,IRRIGATION,70CC,STERILE: Brand: MEDLINE

## (undated) DEVICE — AIRLIFE™ U/CONNECT-IT OXYGEN TUBING 7 FEET (2.1 M) CRUSH-RESISTANT OXYGEN TUBING, VINYL TIPPED: Brand: AIRLIFE™

## (undated) DEVICE — SUTURE ETHBND EXCEL SZ 2-0 L36IN NONABSORBABLE GRN L26MM SH X523H

## (undated) DEVICE — BW-412T DISP COMBO CLEANING BRUSH: Brand: SINGLE USE COMBINATION CLEANING BRUSH

## (undated) DEVICE — CATH IV AUTOGRD BC YEL 24GA 19 -- INSYTE

## (undated) DEVICE — CONTAINER SPEC 20 ML LID NEUT BUFF FORMALIN 10 % POLYPR STS

## (undated) DEVICE — SUTURE VCRL SZ 3-0 L27IN ABSRB UD L26MM SH 1/2 CIR J416H

## (undated) DEVICE — BLADE,CARBON-STEEL,15,STRL,DISPOSABLE,TB: Brand: MEDLINE

## (undated) DEVICE — PADDING CAST W6INXL4YD COT LO LINTING WYTEX

## (undated) DEVICE — WRISTBAND ID AD W1XL11.5IN RED POLY ALRG PREPRINTED PERM

## (undated) DEVICE — SOLIDIFIER FLUID 3000 CC ABSORB

## (undated) DEVICE — GOWN,SIRUS,FABRNF,XL,20/CS: Brand: MEDLINE

## (undated) DEVICE — QUILTED PREMIUM COMFORT UNDERPAD,EXTRA HEAVY: Brand: WINGS

## (undated) DEVICE — FORCEPS BX L240CM JAW DIA2.8MM L CAP W/ NDL MIC MESH TOOTH

## (undated) DEVICE — CANN NASAL O2 CAPNOGRAPHY AD -- FILTERLINE

## (undated) DEVICE — SOLUTION IRRIG 1000ML 0.9% SOD CHL USP POUR PLAS BTL

## (undated) DEVICE — NEEDLE HYPO 18GA L1.5IN PNK S STL HUB POLYPR SHLD REG BVL

## (undated) DEVICE — TUBING, SUCTION, 1/4" X 12', STRAIGHT: Brand: MEDLINE

## (undated) DEVICE — TRAP SURG QUAD PARABOLA SLOT DSGN SFTY SCRN TRAPEASE

## (undated) DEVICE — EXTREMITY-SFMCASU: Brand: MEDLINE INDUSTRIES, INC.

## (undated) DEVICE — KENDALL RADIOLUCENT FOAM MONITORING ELECTRODE -RECTANGULAR SHAPE: Brand: KENDALL

## (undated) DEVICE — GLOVE SURG SZ 7 L12IN FNGR THK79MIL GRN LTX FREE

## (undated) DEVICE — SNARE ENDOSCP M L240CM W27MM SHTH DIA2.4MM CHN 2.8MM OVL

## (undated) DEVICE — SPONGE GZ W4XL4IN COT 12 PLY TYP VII WVN C FLD DSGN STERILE

## (undated) DEVICE — Z DISCONTINUED USE 2751540 TUBING IRRIG L10IN DISP PMP ENDOGATOR

## (undated) DEVICE — DRESSING,GAUZE,XEROFORM,CURAD,1"X8",ST: Brand: CURAD

## (undated) DEVICE — GARMENT,MEDLINE,DVT,INT,CALF,MED, GEN2: Brand: MEDLINE

## (undated) DEVICE — SUTURE VCRL SZ 2-0 L27IN ABSRB UD L26MM CT-2 1/2 CIR J269H

## (undated) DEVICE — BANDAGE COMPR W6INXL10YD ST M E WHITE/BEIGE

## (undated) DEVICE — SUTURE VCRL SZ 0 L27IN ABSRB UD L26MM CT-2 1/2 CIR J270H

## (undated) DEVICE — 1200 GUARD II KIT W/5MM TUBE W/O VAC TUBE: Brand: GUARDIAN

## (undated) DEVICE — ENDO CARRY-ON PROCEDURE KIT INCLUDES ENZYMATIC SPONGE, GAUZE, BIOHAZARD LABEL, TRAY, LUBRICANT, DIRTY SCOPE LABEL, WATER LABEL, TRAY, DRAWSTRING PAD, AND DEFENDO 4-PIECE KIT.: Brand: ENDO CARRY-ON PROCEDURE KIT

## (undated) DEVICE — BAG SPEC BIOHZD LF 2MIL 6X10IN -- CONVERT TO ITEM 357326

## (undated) DEVICE — NEONATAL-ADULT SPO2 SENSOR: Brand: NELLCOR

## (undated) DEVICE — GLOVE SURG SZ 65 THK91MIL LTX FREE SYN POLYISOPRENE

## (undated) DEVICE — SYRINGE MED 20ML STD CLR PLAS LUERLOCK TIP N CTRL DISP

## (undated) DEVICE — CATH IV AUTOGRD BC BLU 22GA 25 -- INSYTE

## (undated) DEVICE — GLOVE ORANGE PI 7   MSG9070

## (undated) DEVICE — BLOCK BITE L 20X27MM OD60FR BLU AD W/ STRP SLD POLYETH ENDO

## (undated) DEVICE — ELECTRODE PT RET AD L9FT HI MOIST COND ADH HYDRGEL CORDED

## (undated) DEVICE — KIT IV STRT W CHLORAPREP PD 1ML

## (undated) DEVICE — PAD,ABDOMINAL,5"X9",ST,LF,25/BX: Brand: MEDLINE INDUSTRIES, INC.

## (undated) DEVICE — PADDING CAST W6XL144IN WHT COT SYN RL NONADHESIVE SOF-ROL

## (undated) DEVICE — STRETCH BANDAGE ROLL: Brand: DERMACEA

## (undated) DEVICE — CANISTER, RIGID, 3000CC: Brand: MEDLINE INDUSTRIES, INC.

## (undated) DEVICE — SUTURE ETHLN SZ 3-0 L18IN NONABSORBABLE BLK PS-2 L19MM 3/8 1669H

## (undated) DEVICE — SET ADMIN 16ML TBNG L100IN 2 Y INJ SITE IV PIGGY BK DISP

## (undated) DEVICE — CONNECTOR TBNG AUX H2O JET DISP FOR OLY 160/180 SER

## (undated) DEVICE — ZIMMER® STERILE DISPOSABLE TOURNIQUET CUFF WITH PROTECTIVE SLEEVE AND PLC, DUAL PORT, SINGLE BLADDER, 34 IN. (86 CM)

## (undated) DEVICE — BAG BELONG PT PERS CLEAR HANDL

## (undated) DEVICE — SET EXTN TBNG L BOR 4 W STPCOCK ST 32IN PRIMING VOL 6ML

## (undated) DEVICE — Device

## (undated) DEVICE — DRAPE C ARM W/ PLT PROTCT NEO

## (undated) DEVICE — SPONGE LAPAROTOMY W18XL18IN WHITE STRUNG RADIOPAQUE STERILE